# Patient Record
Sex: FEMALE | Race: WHITE | Employment: OTHER | URBAN - METROPOLITAN AREA
[De-identification: names, ages, dates, MRNs, and addresses within clinical notes are randomized per-mention and may not be internally consistent; named-entity substitution may affect disease eponyms.]

---

## 2017-03-04 ENCOUNTER — APPOINTMENT (OUTPATIENT)
Dept: GENERAL RADIOLOGY | Age: 52
DRG: 378 | End: 2017-03-04
Attending: EMERGENCY MEDICINE
Payer: MEDICARE

## 2017-03-04 ENCOUNTER — HOSPITAL ENCOUNTER (INPATIENT)
Age: 52
LOS: 3 days | Discharge: HOME | DRG: 378 | End: 2017-03-07
Attending: EMERGENCY MEDICINE | Admitting: FAMILY MEDICINE
Payer: MEDICARE

## 2017-03-04 DIAGNOSIS — F10.10 ALCOHOL ABUSE: ICD-10-CM

## 2017-03-04 DIAGNOSIS — K92.1 GASTROINTESTINAL HEMORRHAGE WITH MELENA: Primary | ICD-10-CM

## 2017-03-04 DIAGNOSIS — D50.9 MICROCYTIC ANEMIA: ICD-10-CM

## 2017-03-04 DIAGNOSIS — K27.9 PUD (PEPTIC ULCER DISEASE): ICD-10-CM

## 2017-03-04 PROBLEM — K92.2 GI BLEED: Status: ACTIVE | Noted: 2017-03-04

## 2017-03-04 PROBLEM — D62 ACUTE BLOOD LOSS ANEMIA: Status: ACTIVE | Noted: 2017-03-04

## 2017-03-04 LAB
ALBUMIN SERPL BCP-MCNC: 3 G/DL (ref 3.5–5)
ALBUMIN/GLOB SERPL: 1 {RATIO} (ref 1.1–2.2)
ALP SERPL-CCNC: 100 U/L (ref 45–117)
ALT SERPL-CCNC: 35 U/L (ref 12–78)
AMYLASE SERPL-CCNC: 37 U/L (ref 25–115)
ANION GAP BLD CALC-SCNC: 14 MMOL/L (ref 5–15)
APTT PPP: 23.6 SEC (ref 22.1–32.5)
AST SERPL W P-5'-P-CCNC: 34 U/L (ref 15–37)
BASOPHILS # BLD AUTO: 0.1 K/UL (ref 0–0.1)
BASOPHILS # BLD: 0 % (ref 0–1)
BILIRUB SERPL-MCNC: 0.3 MG/DL (ref 0.2–1)
BUN SERPL-MCNC: 36 MG/DL (ref 6–20)
BUN/CREAT SERPL: 46 (ref 12–20)
CALCIUM SERPL-MCNC: 8.3 MG/DL (ref 8.5–10.1)
CHLORIDE SERPL-SCNC: 101 MMOL/L (ref 97–108)
CK MB CFR SERPL CALC: NORMAL % (ref 0–2.5)
CK MB SERPL-MCNC: <1 NG/ML (ref 5–25)
CK SERPL-CCNC: 48 U/L (ref 26–192)
CO2 SERPL-SCNC: 22 MMOL/L (ref 21–32)
CREAT SERPL-MCNC: 0.79 MG/DL (ref 0.55–1.02)
EOSINOPHIL # BLD: 0.2 K/UL (ref 0–0.4)
EOSINOPHIL NFR BLD: 1 % (ref 0–7)
ERYTHROCYTE [DISTWIDTH] IN BLOOD BY AUTOMATED COUNT: 13.9 % (ref 11.5–14.5)
GLOBULIN SER CALC-MCNC: 3 G/DL (ref 2–4)
GLUCOSE SERPL-MCNC: 225 MG/DL (ref 65–100)
HCT VFR BLD AUTO: 23.5 % (ref 35–47)
HEMOCCULT STL QL: POSITIVE
HGB BLD-MCNC: 7.5 G/DL (ref 11.5–16)
INR PPP: 1.2 (ref 0.9–1.1)
LIPASE SERPL-CCNC: 148 U/L (ref 73–393)
LYMPHOCYTES # BLD AUTO: 13 % (ref 12–49)
LYMPHOCYTES # BLD: 1.6 K/UL (ref 0.8–3.5)
MCH RBC QN AUTO: 32.1 PG (ref 26–34)
MCHC RBC AUTO-ENTMCNC: 31.9 G/DL (ref 30–36.5)
MCV RBC AUTO: 100.4 FL (ref 80–99)
MONOCYTES # BLD: 0.8 K/UL (ref 0–1)
MONOCYTES NFR BLD AUTO: 6 % (ref 5–13)
NEUTS SEG # BLD: 9.5 K/UL (ref 1.8–8)
NEUTS SEG NFR BLD AUTO: 80 % (ref 32–75)
PLATELET # BLD AUTO: 250 K/UL (ref 150–400)
POTASSIUM SERPL-SCNC: 3.6 MMOL/L (ref 3.5–5.1)
PROT SERPL-MCNC: 6 G/DL (ref 6.4–8.2)
PROTHROMBIN TIME: 12.1 SEC (ref 9–11.1)
RBC # BLD AUTO: 2.34 M/UL (ref 3.8–5.2)
SODIUM SERPL-SCNC: 137 MMOL/L (ref 136–145)
THERAPEUTIC RANGE,PTTT: NORMAL SECS (ref 58–77)
TROPONIN I SERPL-MCNC: <0.04 NG/ML
WBC # BLD AUTO: 12 K/UL (ref 3.6–11)

## 2017-03-04 PROCEDURE — 86920 COMPATIBILITY TEST SPIN: CPT | Performed by: EMERGENCY MEDICINE

## 2017-03-04 PROCEDURE — 84484 ASSAY OF TROPONIN QUANT: CPT | Performed by: EMERGENCY MEDICINE

## 2017-03-04 PROCEDURE — 85025 COMPLETE CBC W/AUTO DIFF WBC: CPT | Performed by: EMERGENCY MEDICINE

## 2017-03-04 PROCEDURE — 85610 PROTHROMBIN TIME: CPT | Performed by: EMERGENCY MEDICINE

## 2017-03-04 PROCEDURE — 82550 ASSAY OF CK (CPK): CPT | Performed by: EMERGENCY MEDICINE

## 2017-03-04 PROCEDURE — 30233N1 TRANSFUSION OF NONAUTOLOGOUS RED BLOOD CELLS INTO PERIPHERAL VEIN, PERCUTANEOUS APPROACH: ICD-10-PCS | Performed by: EMERGENCY MEDICINE

## 2017-03-04 PROCEDURE — 65660000000 HC RM CCU STEPDOWN

## 2017-03-04 PROCEDURE — 93005 ELECTROCARDIOGRAM TRACING: CPT

## 2017-03-04 PROCEDURE — 96374 THER/PROPH/DIAG INJ IV PUSH: CPT

## 2017-03-04 PROCEDURE — 36415 COLL VENOUS BLD VENIPUNCTURE: CPT | Performed by: EMERGENCY MEDICINE

## 2017-03-04 PROCEDURE — C9113 INJ PANTOPRAZOLE SODIUM, VIA: HCPCS | Performed by: EMERGENCY MEDICINE

## 2017-03-04 PROCEDURE — 86900 BLOOD TYPING SEROLOGIC ABO: CPT | Performed by: EMERGENCY MEDICINE

## 2017-03-04 PROCEDURE — P9016 RBC LEUKOCYTES REDUCED: HCPCS | Performed by: EMERGENCY MEDICINE

## 2017-03-04 PROCEDURE — 96375 TX/PRO/DX INJ NEW DRUG ADDON: CPT

## 2017-03-04 PROCEDURE — 74011250636 HC RX REV CODE- 250/636: Performed by: EMERGENCY MEDICINE

## 2017-03-04 PROCEDURE — 36430 TRANSFUSION BLD/BLD COMPNT: CPT

## 2017-03-04 PROCEDURE — 83690 ASSAY OF LIPASE: CPT | Performed by: EMERGENCY MEDICINE

## 2017-03-04 PROCEDURE — 82272 OCCULT BLD FECES 1-3 TESTS: CPT | Performed by: EMERGENCY MEDICINE

## 2017-03-04 PROCEDURE — 96361 HYDRATE IV INFUSION ADD-ON: CPT

## 2017-03-04 PROCEDURE — 71010 XR CHEST PORT: CPT

## 2017-03-04 PROCEDURE — 77030029131 HC ADMN ST IV BLD N DEHP ICUM -B

## 2017-03-04 PROCEDURE — 99285 EMERGENCY DEPT VISIT HI MDM: CPT

## 2017-03-04 PROCEDURE — 85730 THROMBOPLASTIN TIME PARTIAL: CPT | Performed by: EMERGENCY MEDICINE

## 2017-03-04 PROCEDURE — 80053 COMPREHEN METABOLIC PANEL: CPT | Performed by: EMERGENCY MEDICINE

## 2017-03-04 PROCEDURE — 82150 ASSAY OF AMYLASE: CPT | Performed by: EMERGENCY MEDICINE

## 2017-03-04 PROCEDURE — 74011000250 HC RX REV CODE- 250: Performed by: EMERGENCY MEDICINE

## 2017-03-04 RX ORDER — SODIUM CHLORIDE 9 MG/ML
250 INJECTION, SOLUTION INTRAVENOUS AS NEEDED
Status: DISCONTINUED | OUTPATIENT
Start: 2017-03-04 | End: 2017-03-07 | Stop reason: HOSPADM

## 2017-03-04 RX ORDER — PANTOPRAZOLE SODIUM 40 MG/10ML
40 INJECTION, POWDER, LYOPHILIZED, FOR SOLUTION INTRAVENOUS
Status: COMPLETED | OUTPATIENT
Start: 2017-03-04 | End: 2017-03-04

## 2017-03-04 RX ADMIN — PANTOPRAZOLE SODIUM 40 MG: 40 INJECTION, POWDER, FOR SOLUTION INTRAVENOUS at 22:09

## 2017-03-04 RX ADMIN — FOLIC ACID: 5 INJECTION, SOLUTION INTRAMUSCULAR; INTRAVENOUS; SUBCUTANEOUS at 22:26

## 2017-03-04 RX ADMIN — SODIUM CHLORIDE 1000 ML: 900 INJECTION, SOLUTION INTRAVENOUS at 21:54

## 2017-03-04 NOTE — IP AVS SNAPSHOT
Bynum Montserrat 
 
 
 [address] [facility] 
[phone] Patient: Ivanna Alexis MRN: [id number] SGM:[birth year] You are allergic to the following Allergen Reactions Pcn (Penicillins) Hives Recent Documentation Height Weight Breastfeeding? BMI OB Status Smoking Status 1.473 m 54.4 kg No 25.08 kg/m2 Postmenopausal Never Smoker Unresulted Labs Order Current Status SAMPLE TO BLOOD BANK In process TYPE & CROSSMATCH Preliminary result Emergency Contacts Name Discharge Info Relation Home Work Mobile [facility] CAREGIVER [3] Friend [5] [phone] About your hospitalization You were admitted on:  March 4, 2017 You last received care in the:  [facility] 3 PRO CARE TELE 2 You were discharged on:  March 7, 2017 Unit phone number:  [phone] Why you were hospitalized Your primary diagnosis was:  Gi Bleed Your diagnoses also included:  Hypothyroidism, Depression, Alcohol Abuse, Acute Blood Loss Anemia Providers Seen During Your Hospitalizations Provider Role Specialty Primary office phone Clarice Lock MD Attending Provider Emergency Medicine [phone] Conrado Rivas MD Attending Provider [facility] [phone] Your Primary Care Physician (PCP) Primary Care Physician Office Phone Office Fax [place], [address] [facility] [phone] Follow-up Information Follow up With Details Comments Contact Info Lai Williamson MD   [address] [address] 
[phone] Current Discharge Medication List  
  
CONTINUE these medications which have CHANGED Dose & Instructions Dispensing Information Comments Morning Noon Evening Bedtime  
 omeprazole 20 mg capsule Commonly known as:  PRILOSEC What changed:   
- when to take this 
- reasons to take this Your next dose is: Today, Tomorrow Other:  _________ Dose:  20 mg Take 1 Cap by mouth two (2) times a day. Quantity:  60 Cap Refills:  3 CONTINUE these medications which have NOT CHANGED Dose & Instructions Dispensing Information Comments Morning Noon Evening Bedtime  
 albuterol 90 mcg/actuation inhaler Commonly known as:  PROVENTIL HFA, VENTOLIN HFA, PROAIR HFA Your next dose is: Today, Tomorrow Other:  _________ Dose:  2 Puff Take 2 Puffs by inhalation every six (6) hours as needed for Wheezing. Refills:  0 DULoxetine 60 mg capsule Commonly known as:  CYMBALTA Your next dose is: Today, Tomorrow Other:  _________ Dose:  120 mg Take 120 mg by mouth daily. Refills:  0  
     
   
   
   
  
 FISH -1,000 mg capsule Generic drug:  fish oil-omega-3 fatty acids Your next dose is: Today, Tomorrow Other:  _________ Dose:  1 Cap Take 1 Cap by mouth daily as needed (Psoriasis). Refills:  0 PRIMATENE ASTHMA 12.5-200 mg Tab Generic drug:  ePHEDrine-guaiFENesin Your next dose is: Today, Tomorrow Other:  _________ Dose:  1 Tab Take 1 Tab by mouth daily as needed. Refills:  0 STOP taking these medications   
 aspirin 325 mg tablet Commonly known as:  ASPIRIN Where to Get Your Medications Information on where to get these meds will be given to you by the nurse or doctor. ! Ask your nurse or doctor about these medications  
  omeprazole 20 mg capsule Discharge Instructions Patient Discharge Instructions Katelin Rojas / [id number] : 1965 Admitted 3/4/2017 Discharged: 3/7/2017 4:02 PM  
 
ACUTE DIAGNOSES: 
GI bleed 
melena CHRONIC MEDICAL DIAGNOSES: 
Problem List as of 3/7/2017  Date Reviewed: 3/5/2017 Codes Class Noted - Resolved * (Principal)GI bleed ICD-10-CM: K92.2 ICD-9-CM: 578.9  3/4/2017 - Present Acute blood loss anemia ICD-10-CM: D62 
ICD-9-CM: 285.1  3/4/2017 - Present Hypothyroidism (Chronic) ICD-10-CM: E03.9 ICD-9-CM: 244.9  6/3/2015 - Present Depression (Chronic) ICD-10-CM: F32.9 ICD-9-CM: 617  6/3/2015 - Present Microcytic anemia (Chronic) ICD-10-CM: D50.9 ICD-9-CM: 280.9  6/3/2015 - Present Alcohol abuse (Chronic) ICD-10-CM: F10.10 ICD-9-CM: 305.00  6/3/2015 - Present RESOLVED: SOB (shortness of breath) ICD-10-CM: R06.02 
ICD-9-CM: 786.05  6/3/2015 - 3/4/2017 DISCHARGE MEDICATIONS:  
No Asprin for now. No Motrin or Ibuprofen for now. No aleve for now. Use tylenol, · It is important that you take the medication exactly as they are prescribed. · Keep your medication in the bottles provided by the pharmacist and keep a list of the medication names, dosages, and times to be taken in your wallet. · Do not take other medications without consulting your doctor. DIET:  Regular Diet ACTIVITY: Activity as tolerated ADDITIONAL INFORMATION: If you experience any of the following symptoms then please call your primary care physician or return to the emergency room if you cannot get hold of your doctor: Fever, chills, nausea, vomiting, diarrhea, change in mentation, falling, bleeding, shortness of breath. FOLLOW UP CARE: 
Dr. Anthony Beatty MD  you are to call and set up an appointment to see them with in 1 week. Follow-up with specialists at directed by them Information obtained by : 
I understand that if any problems occur once I am at home I am to contact my physician. I understand and acknowledge receipt of the instructions indicated above. Physician's or R.N.'s Signature                                                                  Date/Time Patient or Representative Signature                                                          Date/Time Peptic Ulcer Disease: Care Instructions Your Care Instructions Peptic ulcers are sores on the inside of the stomach or the small intestine. They are usually caused by an infection with bacteria or from use of nonsteroidal anti-inflammatory drugs (NSAIDs). NSAIDs include aspirin, ibuprofen (Advil), and naproxen (Aleve). Your doctor may have prescribed medicine to reduce stomach acid. You also may need to take antibiotics if your peptic ulcers are caused by an infection. You can help your stomach heal and keep ulcers from coming back by making some changes in your lifestyle. Quit smoking, limit caffeine and alcohol, and reduce stress. Follow-up care is a key part of your treatment and safety. Be sure to make and go to all appointments, and call your doctor if you are having problems. Its also a good idea to know your test results and keep a list of the medicines you take. How can you care for yourself at home? · Take your medicines exactly as prescribed. Call your doctor if you think you are having a problem with your medicine. · Do not take aspirin or other NSAIDs such as ibuprofen (Advil or Motrin) or naproxen (Aleve). Ask your doctor what you can take for pain. · Do not smoke. Smoking can make ulcers worse. If you need help quitting, talk to your doctor about stop-smoking programs and medicines. These can increase your chances of quitting for good. · Drink in moderation or avoid drinking alcohol. · Do not drink beverages that have caffeine if they bother your stomach. These include coffee, tea, and soda. · Eat a balanced diet of small, frequent meals. Make an appointment with a dietitian if you need help planning your meals. · Reduce stress. Avoid people and places that make you feel anxious, if you can. Learn ways to reduce stress, such as biofeedback, guided imagery, and meditation. When should you call for help? Call 911 anytime you think you may need emergency care. For example, call if: 
· You passed out (lost consciousness). · You vomit blood or what looks like coffee grounds. · You pass maroon or very bloody stools. Call your doctor now or seek immediate medical care if: 
· You have severe pain in your belly, back, or shoulders. · You have new or worsening belly pain. · You are dizzy or lightheaded, or you feel like you may faint. · Your stools are black and tarlike or have streaks of blood. Watch closely for changes in your health, and be sure to contact your doctor if: 
· You have new symptoms such as weight loss, nausea or vomiting. · You do not feel better as expected. Where can you learn more? Go to http://sindy-abrahan.info/. Enter E626 in the search box to learn more about \"Peptic Ulcer Disease: Care Instructions. \" Current as of: August 9, 2016 Content Version: 11.1 © 2813-1898 [employer]. Care instructions adapted under license by [employer] (which disclaims liability or warranty for this information). If you have questions about a medical condition or this instruction, always ask your healthcare professional. [place] [address] any warranty or liability for your use of this information. Discharge Orders None [email or web address] Announcement We are excited to announce that we are making your provider's discharge notes available to you in [place].   You will see these notes when they are completed and signed by the physician that discharged you from your recent hospital stay. If you have any questions or concerns about any information you see in MyChart, please call the Health Information Department where you were seen or reach out to your Primary Care Provider for more information about your plan of care. General Information Please provide this summary of care documentation to your next provider. Patient Signature:  ____________________________________________________________ Date:  ____________________________________________________________  
  
Carolina Hero Provider Signature:  ____________________________________________________________ Date:  ____________________________________________________________

## 2017-03-04 NOTE — IP AVS SNAPSHOT
Current Discharge Medication List  
  
Take these medications at their scheduled times Dose & Instructions Dispensing Information Comments Morning Noon Evening Bedtime DULoxetine 60 mg capsule Commonly known as:  CYMBALTA Your next dose is: Today, Tomorrow Other:  ____________ Dose:  120 mg Take 120 mg by mouth daily. Refills:  0  
     
   
   
   
  
 omeprazole 20 mg capsule Commonly known as:  PRILOSEC Your next dose is: Today, Tomorrow Other:  ____________ Dose:  20 mg Take 1 Cap by mouth two (2) times a day. Quantity:  60 Cap Refills:  3 Take these medications as needed Dose & Instructions Dispensing Information Comments Morning Noon Evening Bedtime  
 albuterol 90 mcg/actuation inhaler Commonly known as:  PROVENTIL HFA, VENTOLIN HFA, PROAIR HFA Your next dose is: Today, Tomorrow Other:  ____________ Dose:  2 Puff Take 2 Puffs by inhalation every six (6) hours as needed for Wheezing. Refills:  0  
     
   
   
   
  
 FISH -1,000 mg capsule Generic drug:  fish oil-omega-3 fatty acids Your next dose is: Today, Tomorrow Other:  ____________ Dose:  1 Cap Take 1 Cap by mouth daily as needed (Psoriasis). Refills:  0 PRIMATENE ASTHMA 12.5-200 mg Tab Generic drug:  ePHEDrine-guaiFENesin Your next dose is: Today, Tomorrow Other:  ____________ Dose:  1 Tab Take 1 Tab by mouth daily as needed. Refills:  0 Where to Get Your Medications Information about where to get these medications is not yet available ! Ask your nurse or doctor about these medications  
  omeprazole 20 mg capsule

## 2017-03-05 LAB
AMPHET UR QL SCN: NEGATIVE
ANION GAP BLD CALC-SCNC: 8 MMOL/L (ref 5–15)
APPEARANCE UR: CLEAR
ATRIAL RATE: 111 BPM
BACTERIA URNS QL MICRO: NEGATIVE /HPF
BARBITURATES UR QL SCN: NEGATIVE
BENZODIAZ UR QL: NEGATIVE
BILIRUB UR QL: NEGATIVE
BUN SERPL-MCNC: 15 MG/DL (ref 6–20)
BUN/CREAT SERPL: 27 (ref 12–20)
CALCIUM SERPL-MCNC: 7.3 MG/DL (ref 8.5–10.1)
CALCULATED P AXIS, ECG09: 72 DEGREES
CALCULATED R AXIS, ECG10: 20 DEGREES
CALCULATED T AXIS, ECG11: 2 DEGREES
CANNABINOIDS UR QL SCN: NEGATIVE
CHLORIDE SERPL-SCNC: 110 MMOL/L (ref 97–108)
CO2 SERPL-SCNC: 24 MMOL/L (ref 21–32)
COCAINE UR QL SCN: NEGATIVE
COLOR UR: ABNORMAL
CREAT SERPL-MCNC: 0.56 MG/DL (ref 0.55–1.02)
DIAGNOSIS, 93000: NORMAL
DRUG SCRN COMMENT,DRGCM: NORMAL
EPITH CASTS URNS QL MICRO: ABNORMAL /LPF
ERYTHROCYTE [DISTWIDTH] IN BLOOD BY AUTOMATED COUNT: 16 % (ref 11.5–14.5)
EST. AVERAGE GLUCOSE BLD GHB EST-MCNC: NORMAL MG/DL
ETHANOL SERPL-MCNC: <10 MG/DL
GLUCOSE SERPL-MCNC: 93 MG/DL (ref 65–100)
GLUCOSE UR STRIP.AUTO-MCNC: NEGATIVE MG/DL
HBA1C MFR BLD: 4.6 % (ref 4.2–6.3)
HCT VFR BLD AUTO: 26.5 % (ref 35–47)
HGB BLD-MCNC: 8.9 G/DL (ref 11.5–16)
HGB UR QL STRIP: NEGATIVE
KETONES UR QL STRIP.AUTO: NEGATIVE MG/DL
LEUKOCYTE ESTERASE UR QL STRIP.AUTO: ABNORMAL
MAGNESIUM SERPL-MCNC: 1.6 MG/DL (ref 1.6–2.4)
MCH RBC QN AUTO: 31 PG (ref 26–34)
MCHC RBC AUTO-ENTMCNC: 33.6 G/DL (ref 30–36.5)
MCV RBC AUTO: 92.3 FL (ref 80–99)
METHADONE UR QL: NEGATIVE
NITRITE UR QL STRIP.AUTO: NEGATIVE
OPIATES UR QL: NEGATIVE
P-R INTERVAL, ECG05: 126 MS
PCP UR QL: NEGATIVE
PH UR STRIP: 6 [PH] (ref 5–8)
PHOSPHATE SERPL-MCNC: 2.2 MG/DL (ref 2.6–4.7)
PLATELET # BLD AUTO: 123 K/UL (ref 150–400)
POTASSIUM SERPL-SCNC: 3.1 MMOL/L (ref 3.5–5.1)
PROT UR STRIP-MCNC: NEGATIVE MG/DL
Q-T INTERVAL, ECG07: 328 MS
QRS DURATION, ECG06: 72 MS
QTC CALCULATION (BEZET), ECG08: 446 MS
RBC # BLD AUTO: 2.87 M/UL (ref 3.8–5.2)
RBC #/AREA URNS HPF: ABNORMAL /HPF (ref 0–5)
SODIUM SERPL-SCNC: 142 MMOL/L (ref 136–145)
SP GR UR REFRACTOMETRY: 1.01 (ref 1–1.03)
TSH SERPL DL<=0.05 MIU/L-ACNC: 3.18 UIU/ML (ref 0.36–3.74)
UA: UC IF INDICATED,UAUC: ABNORMAL
UROBILINOGEN UR QL STRIP.AUTO: 0.2 EU/DL (ref 0.2–1)
VENTRICULAR RATE, ECG03: 111 BPM
WBC # BLD AUTO: 4.5 K/UL (ref 3.6–11)
WBC URNS QL MICRO: ABNORMAL /HPF (ref 0–4)

## 2017-03-05 PROCEDURE — 74011000250 HC RX REV CODE- 250: Performed by: INTERNAL MEDICINE

## 2017-03-05 PROCEDURE — 84100 ASSAY OF PHOSPHORUS: CPT | Performed by: INTERNAL MEDICINE

## 2017-03-05 PROCEDURE — 77010033678 HC OXYGEN DAILY

## 2017-03-05 PROCEDURE — 74011250637 HC RX REV CODE- 250/637: Performed by: FAMILY MEDICINE

## 2017-03-05 PROCEDURE — 80048 BASIC METABOLIC PNL TOTAL CA: CPT | Performed by: INTERNAL MEDICINE

## 2017-03-05 PROCEDURE — 87086 URINE CULTURE/COLONY COUNT: CPT | Performed by: EMERGENCY MEDICINE

## 2017-03-05 PROCEDURE — 80307 DRUG TEST PRSMV CHEM ANLYZR: CPT | Performed by: EMERGENCY MEDICINE

## 2017-03-05 PROCEDURE — 74011250636 HC RX REV CODE- 250/636: Performed by: INTERNAL MEDICINE

## 2017-03-05 PROCEDURE — 36415 COLL VENOUS BLD VENIPUNCTURE: CPT | Performed by: INTERNAL MEDICINE

## 2017-03-05 PROCEDURE — 85027 COMPLETE CBC AUTOMATED: CPT | Performed by: INTERNAL MEDICINE

## 2017-03-05 PROCEDURE — 36430 TRANSFUSION BLD/BLD COMPNT: CPT

## 2017-03-05 PROCEDURE — 83036 HEMOGLOBIN GLYCOSYLATED A1C: CPT | Performed by: FAMILY MEDICINE

## 2017-03-05 PROCEDURE — 65660000000 HC RM CCU STEPDOWN

## 2017-03-05 PROCEDURE — C9113 INJ PANTOPRAZOLE SODIUM, VIA: HCPCS | Performed by: INTERNAL MEDICINE

## 2017-03-05 PROCEDURE — P9016 RBC LEUKOCYTES REDUCED: HCPCS | Performed by: EMERGENCY MEDICINE

## 2017-03-05 PROCEDURE — 84443 ASSAY THYROID STIM HORMONE: CPT | Performed by: INTERNAL MEDICINE

## 2017-03-05 PROCEDURE — 83735 ASSAY OF MAGNESIUM: CPT | Performed by: INTERNAL MEDICINE

## 2017-03-05 PROCEDURE — 81001 URINALYSIS AUTO W/SCOPE: CPT | Performed by: EMERGENCY MEDICINE

## 2017-03-05 RX ORDER — ALBUTEROL SULFATE 90 UG/1
2 AEROSOL, METERED RESPIRATORY (INHALATION)
COMMUNITY
End: 2018-05-08

## 2017-03-05 RX ORDER — DULOXETIN HYDROCHLORIDE 30 MG/1
120 CAPSULE, DELAYED RELEASE ORAL DAILY
Status: DISCONTINUED | OUTPATIENT
Start: 2017-03-05 | End: 2017-03-07 | Stop reason: HOSPADM

## 2017-03-05 RX ORDER — SODIUM CHLORIDE 0.9 % (FLUSH) 0.9 %
5-10 SYRINGE (ML) INJECTION AS NEEDED
Status: DISCONTINUED | OUTPATIENT
Start: 2017-03-05 | End: 2017-03-07 | Stop reason: HOSPADM

## 2017-03-05 RX ORDER — ASPIRIN 325 MG
325 TABLET ORAL
COMMUNITY
End: 2017-03-07

## 2017-03-05 RX ORDER — SODIUM CHLORIDE 0.9 % (FLUSH) 0.9 %
5-10 SYRINGE (ML) INJECTION EVERY 8 HOURS
Status: DISCONTINUED | OUTPATIENT
Start: 2017-03-05 | End: 2017-03-07 | Stop reason: HOSPADM

## 2017-03-05 RX ORDER — ACETAMINOPHEN 325 MG/1
650 TABLET ORAL
Status: DISCONTINUED | OUTPATIENT
Start: 2017-03-05 | End: 2017-03-07 | Stop reason: HOSPADM

## 2017-03-05 RX ORDER — OMEPRAZOLE 20 MG/1
20 CAPSULE, DELAYED RELEASE ORAL
Status: ON HOLD | COMMUNITY
End: 2017-03-07

## 2017-03-05 RX ORDER — SODIUM CHLORIDE 9 MG/ML
125 INJECTION, SOLUTION INTRAVENOUS CONTINUOUS
Status: DISCONTINUED | OUTPATIENT
Start: 2017-03-05 | End: 2017-03-07

## 2017-03-05 RX ADMIN — SODIUM CHLORIDE 40 MG: 9 INJECTION INTRAMUSCULAR; INTRAVENOUS; SUBCUTANEOUS at 22:35

## 2017-03-05 RX ADMIN — ACETAMINOPHEN 650 MG: 325 TABLET ORAL at 10:23

## 2017-03-05 RX ADMIN — Medication 10 ML: at 14:00

## 2017-03-05 RX ADMIN — SODIUM CHLORIDE 125 ML/HR: 900 INJECTION, SOLUTION INTRAVENOUS at 01:14

## 2017-03-05 RX ADMIN — SODIUM CHLORIDE 125 ML/HR: 900 INJECTION, SOLUTION INTRAVENOUS at 12:29

## 2017-03-05 RX ADMIN — SODIUM CHLORIDE 40 MG: 9 INJECTION INTRAMUSCULAR; INTRAVENOUS; SUBCUTANEOUS at 08:16

## 2017-03-05 RX ADMIN — DULOXETINE HYDROCHLORIDE 120 MG: 30 CAPSULE, DELAYED RELEASE ORAL at 11:03

## 2017-03-05 RX ADMIN — Medication 10 ML: at 22:38

## 2017-03-05 RX ADMIN — SODIUM CHLORIDE 125 ML/HR: 900 INJECTION, SOLUTION INTRAVENOUS at 21:22

## 2017-03-05 NOTE — ED PROVIDER NOTES
HPI Comments: 46 y.o. female with past medical history significant for Asthma, Seizures, Pancreatitis, Psoriasis who presents from home driven by freind with chief complaint of SOB. Pt reports 1 day hx of gradually worsening symptoms of SOB accompanied by chest tightness and dry cough. Pt also c/o RUQ pain, diarrhea with blood in stool. She notes hx of similar symptoms 2 years ago related to \"bleeding ulcers\". Pt reports at that time she was admitted and during stay received 4 units of blood. Pt notes the use of Prilosec PRN. Pt denies chest pain, nausea, vomiting, constipation, urinary symptoms, neck pain, back pain, fever, chills, congestion, headache, numbness or tingling. There are no other acute medical concerns at this time. Old chart review: Pt admitted 06/03/15-06/05/15 for gastrointestinal hemorrhage with microcytic/iron deficient anemia. EGD performed 2 ulcers. HGB 3.8. SOB secondary to anemia. Alcohol abuse education. Celiac disease noted. Pt discharged home on Protonix. Social hx: + EtOH use (\"2-3 beers every other night\"), Non-smoker. No illicit drug use. PCP: Tomasa Middleton MD  GI: Thang Coles MD     Note written by Simran Roman, as dictated by Comfort Shah MD 9:52 PM    The history is provided by the patient. No  was used.         Past Medical History:   Diagnosis Date    Anemia     Asthma     Depression     Hypothyroid     Pancreatitis     7 years ago    Psychiatric disorder     Seizures ([facility] [address]        Past Surgical History:   Procedure Laterality Date    COLONOSCOPY  12/4/2014         EGD  12/4/2014         HX CHOLECYSTECTOMY      UPPER GI ENDOSCOPY,BIOPSY  6/4/2015              Family History:   Problem Relation Age of Onset    Cancer Neg Hx     Diabetes Neg Hx        Social History     Social History    Marital status:      Spouse name: N/A    Number of children: N/A    Years of education: N/A     Occupational History    Not on file. Social History Main Topics    Smoking status: Never Smoker    Smokeless tobacco: Never Used    Alcohol use 1.8 oz/week     3 Cans of beer per week      Comment: 2-3 beers very other day.  Drug use: No    Sexual activity: Not on file     Other Topics Concern    Not on file     Social History Narrative         ALLERGIES: Pcn [penicillins]    Review of Systems   Constitutional: Negative for chills and fever. HENT: Negative for congestion. Respiratory: Positive for cough (dry), chest tightness and shortness of breath. Cardiovascular: Negative for chest pain. Gastrointestinal: Positive for abdominal pain (RUQ), blood in stool and diarrhea. Negative for constipation, nausea and vomiting. Genitourinary: Negative for difficulty urinating. Musculoskeletal: Negative for back pain and neck pain. Neurological: Negative for numbness and headaches. All other systems reviewed and are negative. Vitals:    03/04/17 2119   Weight: 54.4 kg (120 lb)   Height: 4' 10\" (1.473 m)            Physical Exam   Nursing note and vitals reviewed. CONSTITUTIONAL: Well-appearing; well-nourished; in mild distress  HEAD: Normocephalic; atraumatic  EYES: PERRL; EOM intact; conjunctiva and sclera are clear bilaterally. ENT: No rhinorrhea; normal pharynx with no tonsillar hypertrophy; mucous membranes pink/moist, no erythema, no exudate. NECK: Supple; non-tender; no cervical lymphadenopathy  CARD: Normal S1, S2; no murmurs, rubs, or gallops. Regular rate and rhythm. RESP: Normal respiratory effort; breath sounds clear and equal bilaterally; no wheezes, rhonchi, or rales. ABD: Normal bowel sounds; non-distended; epigastric tenderness; no palpable organomegaly, no masses, no bruits. Back Exam: Normal inspection; no vertebral point tenderness, no CVA tenderness. Normal range of motion.   EXT: Normal ROM in all four extremities; non-tender to palpation; no swelling or deformity; distal pulses are normal, no edema. SKIN: Warm; dry; Pale; Scattered psoriatic rash. NEURO:Alert and oriented x 3, coherent, CARA-XII grossly intact, sensory and motor are non-focal.  Rectal Exam: Normal inspection; Good tone; Black color stools; guaiac positive        MDM  Number of Diagnoses or Management Options  Alcohol abuse:   Gastrointestinal hemorrhage with melena:   Microcytic anemia:   PUD (peptic ulcer disease):   Diagnosis management comments: Assessment: 58-year-old female with history of GI bleed, microcytic anemia, peptic ulcers and alcohol abuse who presents with shortness of breath and bloody stools and epigastric discomfort. The patient also admits to continues drinking behavior. Differential diagnoses consist of GI bleed likely secondary to alcoholic gastritis and  Peptic ulcer  Disease/ alcohol abuse with dependency/ electrolyte abnormality and ACS      Plan: EKG/ chest x-ray/ lab/ IV fluid/ Protonix/ blood transfusion/ consult hospitalist and GI/ Monitor and Reevaluate.          Amount and/or Complexity of Data Reviewed  Clinical lab tests: ordered and reviewed  Tests in the radiology section of CPT®: ordered and reviewed  Tests in the medicine section of CPT®: reviewed and ordered  Discussion of test results with the performing providers: yes  Decide to obtain previous medical records or to obtain history from someone other than the patient: yes  Obtain history from someone other than the patient: yes  Review and summarize past medical records: yes  Discuss the patient with other providers: yes  Independent visualization of images, tracings, or specimens: yes    Risk of Complications, Morbidity, and/or Mortality  Presenting problems: moderate  Diagnostic procedures: moderate  Management options: moderate    Critical Care  Total time providing critical care: (Total critical care time spend exclusive of procedures: 45 minutes)    Patient Progress  Patient progress: stable    ED Course       Procedures     ED EKG interpretation:  Rhythm: sinus tachycardia; and regular . Rate (approx.): 111; Axis: normal; P wave: normal; QRS interval: normal ; ST/T wave: non-specific changes; in  Lead: Diffusely; Other findings: abnormal ekg. This EKG was interpreted by Sandra Gross MD,ED Provider. XRAY INTERPRETATION (ED MD)  Chest Xray  No acute process seen. Normal heart size. No bony abnormalities. No infiltrate. Sandra Gross MD 10:12 PM      PROGRESS NOTE:  Pt has been reexamined by Sandra Gross MD all available results have been reviewed with pt and any available family. Pt understands sx, dx, and tx in ED. Care plan has been outlined and questions have been answered. Pt and any available family understands and agrees to need for admission to hospital for further tx not available in ED. Pt is ready for admission. Written by Sandra Gross MD,  10:14 PM    CONSULT NOTE:  Sandra Gross MD spoke with Dr. Suellen Torres of the adult hospitalist team. Discussed patient's presentation, history, physical assessment, and available diagnostic results.  He will evaluate, write orders and admit the patient to the hospital. 10:45 PM        .

## 2017-03-05 NOTE — H&P
[address]  [phone]    Admission History and Physical      NAME:  Hodan Coughlin   :   1965   MRN:  [id number]     PCP:  Aisha Rasmussen MD     Date/Time:  3/4/2017         Subjective:     CHIEF COMPLAINT: GI bleed     HISTORY OF PRESENT ILLNESS:     Ms. MCCRARY [facility] IN [facility] is a 46 y.o.  female who is admitted with GI bleed. Ms. RONDON SAMANTHA [facility] IN THE [facility] presented to the Emergency Department this PM complaining of GI bleed: started 3 days ago, intermittent, dark blood per rectum, associated with SOB    History obtained from chart review and the patient. Previous records reviewed    Past Medical History:   Diagnosis Date    Anemia     Asthma     Depression     Hypothyroid     Pancreatitis     7 years ago    Psychiatric disorder     Seizures ([facility] [address]         Past Surgical History:   Procedure Laterality Date    COLONOSCOPY  2014         EGD  2014         HX CHOLECYSTECTOMY      UPPER GI ENDOSCOPY,BIOPSY  2015            Social History   Substance Use Topics    Smoking status: Never Smoker    Smokeless tobacco: Never Used    Alcohol use 1.8 oz/week     3 Cans of beer per week      Comment: 2-3 beers very other day. Family History   Problem Relation Age of Onset    Cancer Neg Hx     Diabetes Neg Hx         Allergies   Allergen Reactions    Pcn [Penicillins] Hives        Prior to Admission medications    Medication Sig Start Date End Date Taking? Authorizing Provider   DULoxetine (CYMBALTA) 60 mg capsule Take 120 mg by mouth daily. Yes Historical Provider   ePHEDrine-guaiFENesin (PRIMATENE ASTHMA) 12.5-200 mg tab Take 1 Tab by mouth daily as needed.    Yes Historical Provider         Review of Systems:  (bold if positive, if negative)    Gen:  Eyes:  ENT:  CVS:  chest painPulm:  dyspneaGI:  Abdominal pain, nausea,melena  :    MS:  Skin:  Psych:  Endo:    Hem:  Renal:    Neuro:            Objective: VITALS:    Vital signs reviewed; most recent are:    Visit Vitals    /67 (BP 1 Location: Right arm, BP Patient Position: At rest)    Pulse 96    Temp 97.9 °F (36.6 °C)    Resp 19    Ht 4' 10\" (1.473 m)    Wt 54.4 kg (120 lb)    SpO2 100%    BMI 25.08 kg/m2     SpO2 Readings from Last 6 Encounters:   03/04/17 100%   06/05/15 96%   12/04/14 98%   07/18/14 94%   07/09/14 98%   08/20/13 100%    O2 Flow Rate (L/min): 2 l/min   No intake or output data in the 24 hours ending 03/04/17 2306         Exam:     Physical Exam:    Gen: Well-developed, well-nourished, in no acute distress  HEENT:  Pale conjunctivae, PERRL, hearing intact to voice, moist mucous membranes, very poor dentition, multiple broken and carious teeth  Neck: Supple, without masses, thyroid non-tender  Resp: No accessory muscle use, clear breath sounds without wheezes rales or rhonchi  Card: No murmurs, normal S1, S2 without thrills, bruits or peripheral edema  Abd:  Soft, non-tender, non-distended, normoactive bowel sounds are present, no palpable organomegaly and no detectable hernias  Lymph:  No cervical or inguinal adenopathy  Musc: No cyanosis or clubbing  Skin: No rashes or ulcers, skin turgor is good, pallor noted  Neuro:  Cranial nerves are grossly intact, no focal motor weakness, follows commands appropriately  Psych:  Good insight, oriented to person, place and time, alert             Labs:    Recent Labs      03/04/17 2142   WBC  12.0*   HGB  7.5*   HCT  23.5*   PLT  250     Recent Labs      03/04/17 2142   NA  137   K  3.6   CL  101   CO2  22   GLU  225*   BUN  36*   CREA  0.79   CA  8.3*   ALB  3.0*   TBILI  0.3   SGOT  34   ALT  35     Lab Results   Component Value Date/Time    Glucose (POC) 194 06/03/2015 11:26 AM    Glucose (POC) 99 07/09/2014 04:12 PM     No results for input(s): PH, PCO2, PO2, HCO3, FIO2 in the last 72 hours.   Recent Labs      03/04/17 2142   INR  1.2*       Additional testing:  Chest X-ray: no acute process.   Results not reviewed with Radiologist.       Assessment/Plan:       Principal Problem:    GI bleed (3/4/2017)   - upper vs lower bleed, unclear at this time   - close monitoring in StepDown unit   - IV PPI to continue   - GI consult   - NPO, IV hydration    Active Problems:    Hypothyroidism (6/3/2015)   - not on LT4   - check TSH in AM      Depression (6/3/2015)   - continue Cymbalta      Alcohol abuse (6/3/2015)   - counseled on cessation   - reports she does not drink every day, should be low risk for DTs      Acute blood loss anemia (3/4/2017)   - POA, due to acute GI blood loss over last few days   - receiving 2 units already ordered in ED   - follow Hgb closely, transfuse as need to maintain Hgb >8       Code status:   - patient is FULL CODE      Total time spent with patient: Jaja Ley discussed with: Patient and Dr. Dahl Failing    Discussed:  Code Status, Care Plan and D/C Planning    Prophylaxis:  SCD's and H2B/PPI    Probable Disposition:  Home w/Family           ___________________________________________________    Attending Physician: Cami Cole MD

## 2017-03-05 NOTE — PROGRESS NOTES
Bedside and Verbal shift change report given to Kahlil Gonzalez (oncoming nurse) by Hanh Cartagena (offgoing nurse). Report included the following information SBAR, Kardex and MAR.

## 2017-03-05 NOTE — PROGRESS NOTES
[facility]: MED RECONCILIATION    Comments/Recommendations: - Verified allergies as documented. She had a rash to PCN when she was ~ 9years old. She states she also had an upset stomach when they used IVP dye.   - Discussed that aspirin and other NSAIDs are not safe for her to use with her ulcers/ bleeding. She acknowledges that she understands but she states that tylenol does not help to relieve her pain. She has used ZULAY tablets in the past (2016), that relieved her migraines/ stress headaches. - In her refill history, I noted, levothyroxine 25mcg was filled from 8/2013 to 1/2015. She states that the last time she saw her PCP in 2015, they told her her thyroid levels was doing fine so she stopped taking the levothyroxine. Explained that she may need to follow-up on that. - She states that she has the albuterol inhaler for asthma exacerbations and she used advair in the past but she could not say why she stopped taking it other that she did not think she needed it. - She reports that she uses some OTC and prescription creams and ointments for her psoriasis but could not recall the names. Medications added:     · Aspirin  · Prilosec  · Fish oil  · Albuterol inhaler    Medications removed:    · None    Medications adjusted:    · None    Information obtained from: Patient, refill history    Significant PMH/Disease States:   Patient Active Problem List   Diagnosis Code    Hypothyroidism E03.9    Depression F32.9    Microcytic anemia D50.9    Alcohol abuse F10.10    GI bleed K92.2    Acute blood loss anemia D62       Chief Complaint for this Admission:   Chief Complaint   Patient presents with    Shortness of Breath       Allergies: Pcn [penicillins]      Prior to Admission Medications:   Prior to Admission Medications   Prescriptions Last Dose Informant Patient Reported? Taking? DULoxetine (CYMBALTA) 60 mg capsule 3/3/2017 at AM Self Yes Yes   Sig: Take 120 mg by mouth daily.    albuterol (PROVENTIL HFA, VENTOLIN HFA, PROAIR HFA) 90 mcg/actuation inhaler 2/5/2017 at Unknown time Self Yes Yes   Sig: Take 2 Puffs by inhalation every six (6) hours as needed for Wheezing. aspirin (ASPIRIN) 325 mg tablet 2/26/2017 at Unknown time Self Yes Yes   Sig: Take 325 mg by mouth every six (6) hours as needed (Migraines/ Stress headaches). ePHEDrine-guaiFENesin (PRIMATENE ASTHMA) 12.5-200 mg tab 2/26/2017 at Unknown time Self Yes Yes   Sig: Take 1 Tab by mouth daily as needed. fish oil-omega-3 fatty acids (FISH OIL) 340-1,000 mg capsule 2/26/2017 at Unknown time Self Yes Yes   Sig: Take 1 Cap by mouth daily as needed (Psoriasis). omeprazole (PRILOSEC) 20 mg capsule 2/26/2017 at Unknown time Self Yes Yes   Sig: Take 20 mg by mouth daily as needed.       Facility-Administered Medications: None       Thank you,  Tanvir Nava, PharmD     Contact: [phone]

## 2017-03-05 NOTE — ROUTINE PROCESS
TRANSFER - OUT REPORT:    Verbal report given to Liz Carballo RN (name) on Belkys Mendosa  being transferred to 334 (unit) for routine progression of care       Report consisted of patients Situation, Background, Assessment and   Recommendations(SBAR). Information from the following report(s) SBAR, ED Summary, Intake/Output, MAR, Recent Results and Cardiac Rhythm SR was reviewed with the receiving nurse. Lines:   Peripheral IV 03/04/17 Right Antecubital (Active)   Site Assessment Clean, dry, & intact 3/4/2017  9:46 PM   Phlebitis Assessment 0 3/4/2017  9:46 PM   Infiltration Assessment 0 3/4/2017  9:46 PM   Dressing Status Clean, dry, & intact 3/4/2017  9:46 PM   Dressing Type 4 X 4 3/4/2017  9:46 PM   Hub Color/Line Status Pink 3/4/2017  9:46 PM   Action Taken Catheter retaped 3/4/2017  9:46 PM       Peripheral IV 03/04/17 Left Hand (Active)   Site Assessment Clean, dry, & intact 3/4/2017 11:40 PM   Phlebitis Assessment 0 3/4/2017 11:40 PM   Infiltration Assessment 0 3/4/2017 11:40 PM        Opportunity for questions and clarification was provided.       Patient transported with:   Registered Nurse

## 2017-03-05 NOTE — ED TRIAGE NOTES
Patient arrives with c/o chest tightness and sob onset this afternoon and rectal bleeding onset Thursday. Patient states she has a hx of 2 bleeding ulcers, denies being on blood thinners. Patient pale at bedside.

## 2017-03-05 NOTE — PROGRESS NOTES
Daily Progress Note: 3/5/2017  Heber Mason MD    Assessment/Plan:   GI bleed (3/4/2017)  - upper vs lower bleed, unclear at this time  - close monitoring in StepDown unit  - IV PPI to continue  - GI consulted  - NPO, IV hydration     Hypothyroidism (6/3/2015)  - not on LT4  - check TSH in AM     Depression (6/3/2015)  - continue Cymbalta     Alcohol abuse (6/3/2015)  - counseled on cessation  - reports she does not drink every day, should be low risk for DTs     Acute blood loss anemia (3/4/2017)  - POA, due to acute GI blood loss over last few days  - received 2 units   - follow Hgb closely, transfuse as need to maintain Hgb >8    Elevated glucose  - add A1c    Code status:  - patient is FULL CODE        Problem List:  Problem List as of 3/5/2017  Date Reviewed: 3/5/2017          Codes Class Noted - Resolved    * (Principal)GI bleed ICD-10-CM: K92.2  ICD-9-CM: 578.9  3/4/2017 - Present        Acute blood loss anemia ICD-10-CM: D62  ICD-9-CM: 285.1  3/4/2017 - Present        Hypothyroidism (Chronic) ICD-10-CM: E03.9  ICD-9-CM: 244.9  6/3/2015 - Present        Depression (Chronic) ICD-10-CM: F32.9  ICD-9-CM: 311  6/3/2015 - Present        Microcytic anemia (Chronic) ICD-10-CM: D50.9  ICD-9-CM: 280.9  6/3/2015 - Present        Alcohol abuse (Chronic) ICD-10-CM: F10.10  ICD-9-CM: 305.00  6/3/2015 - Present        RESOLVED: SOB (shortness of breath) ICD-10-CM: R06.02  ICD-9-CM: 786.05  6/3/2015 - 3/4/2017              Subjective:    46 y.o.  female who is admitted with GI bleed. Ms. Christine Nicolas presented to the Emergency Department this PM complaining of GI bleed: started 3 days ago, intermittent, dark blood per rectum, associated with SOB  History obtained from chart review and the patient. Previous records reviewed (Dr Judy Liao). 3/5:  She is feeling a little better. Last night prior to coming in to the ER she was having palpitations and SOB.  She reports a few black stools that started on Thursday. No vomiting. Admits to drinking 2-3 beers every other day. Review of Systems:   A comprehensive review of systems was negative except for that written in the HPI. Objective:   Physical Exam:     Visit Vitals    /77    Pulse 89    Temp 98.2 °F (36.8 °C)    Resp 14    Ht 4' 10\" (1.473 m)    Wt 54.4 kg (120 lb)    SpO2 98%    BMI 25.08 kg/m2    O2 Flow Rate (L/min): 2 l/min O2 Device: Nasal cannula    Temp (24hrs), Av.2 °F (36.8 °C), Min:97.9 °F (36.6 °C), Max:98.5 °F (36.9 °C)         190 -  0700  In: 363.3   Out: 1125 [Urine:1125]    General:  Alert, cooperative, no distress, appears stated age. Head:  Normocephalic, without obvious abnormality, atraumatic. Eyes:  Conjunctivae/corneas clear. Nose: Nares normal. Septum midline. Mucosa normal. No drainage or sinus tenderness. Throat: Lips, mucosa, and tongue moist.  very poor dentition, multiple broken and carious teeth   Neck: Supple, symmetrical, trachea midline, no adenopathy, thyroid: no enlargement/tenderness/nodules, no carotid bruit and no JVD. Lungs:   Clear to auscultation bilaterally. Heart:  Regular rate and rhythm, S1, S2 normal, no murmur, click, rub or gallop. Abdomen:   Soft, non-tender. Bowel sounds normal. No masses,  No organomegaly. Extremities: no cyanosis or edema. No calf tenderness or cords. Pulses: 2+ and symmetric all extremities. Skin: Skin color, texture, turgor normal. No rashes or lesions   Neurologic: CNII-XII intact. Alert and oriented X 3. Fine motor of hands and fingers normal.   equal.  No cogwheeling or rigidity. Gait not tested at this time. Sensation grossly normal to touch.   Gross motor of extremities normal.       Data Review:       Recent Days:  Recent Labs      17   WBC  12.0*   HGB  7.5*   HCT  23.5*   PLT  250     Recent Labs      17   NA  137   K  3.6   CL  101   CO2  22   GLU  225*   BUN  36*   CREA  0.79   CA 8.3*   ALB  3.0*   TBILI  0.3   SGOT  34   ALT  35   INR  1.2*       24 Hour Results:  Recent Results (from the past 24 hour(s))   EKG, 12 LEAD, INITIAL    Collection Time: 03/04/17  9:21 PM   Result Value Ref Range    Ventricular Rate 111 BPM    Atrial Rate 111 BPM    P-R Interval 126 ms    QRS Duration 72 ms    Q-T Interval 328 ms    QTC Calculation (Bezet) 446 ms    Calculated P Axis 72 degrees    Calculated R Axis 20 degrees    Calculated T Axis 2 degrees    Diagnosis       Sinus tachycardia  Nonspecific ST abnormality  Abnormal ECG  When compared with ECG of 03-JUN-2015 11:10,  No significant change was found     CBC WITH AUTOMATED DIFF    Collection Time: 03/04/17  9:42 PM   Result Value Ref Range    WBC 12.0 (H) 3.6 - 11.0 K/uL    RBC 2.34 (L) 3.80 - 5.20 M/uL    HGB 7.5 (L) 11.5 - 16.0 g/dL    HCT 23.5 (L) 35.0 - 47.0 %    .4 (H) 80.0 - 99.0 FL    MCH 32.1 26.0 - 34.0 PG    MCHC 31.9 30.0 - 36.5 g/dL    RDW 13.9 11.5 - 14.5 %    PLATELET 263 725 - 245 K/uL    NEUTROPHILS 80 (H) 32 - 75 %    LYMPHOCYTES 13 12 - 49 %    MONOCYTES 6 5 - 13 %    EOSINOPHILS 1 0 - 7 %    BASOPHILS 0 0 - 1 %    ABS. NEUTROPHILS 9.5 (H) 1.8 - 8.0 K/UL    ABS. LYMPHOCYTES 1.6 0.8 - 3.5 K/UL    ABS. MONOCYTES 0.8 0.0 - 1.0 K/UL    ABS. EOSINOPHILS 0.2 0.0 - 0.4 K/UL    ABS.  BASOPHILS 0.1 0.0 - 0.1 K/UL   CK W/ CKMB & INDEX    Collection Time: 03/04/17  9:42 PM   Result Value Ref Range    CK 48 26 - 192 U/L    CK - MB <1.0 <3.6 NG/ML    CK-MB Index Cannot be calulated 0 - 2.5     METABOLIC PANEL, COMPREHENSIVE    Collection Time: 03/04/17  9:42 PM   Result Value Ref Range    Sodium 137 136 - 145 mmol/L    Potassium 3.6 3.5 - 5.1 mmol/L    Chloride 101 97 - 108 mmol/L    CO2 22 21 - 32 mmol/L    Anion gap 14 5 - 15 mmol/L    Glucose 225 (H) 65 - 100 mg/dL    BUN 36 (H) 6 - 20 MG/DL    Creatinine 0.79 0.55 - 1.02 MG/DL    BUN/Creatinine ratio 46 (H) 12 - 20      GFR est AA >60 >60 ml/min/1.73m2    GFR est non-AA >60 >60 ml/min/1.73m2    Calcium 8.3 (L) 8.5 - 10.1 MG/DL    Bilirubin, total 0.3 0.2 - 1.0 MG/DL    ALT (SGPT) 35 12 - 78 U/L    AST (SGOT) 34 15 - 37 U/L    Alk.  phosphatase 100 45 - 117 U/L    Protein, total 6.0 (L) 6.4 - 8.2 g/dL    Albumin 3.0 (L) 3.5 - 5.0 g/dL    Globulin 3.0 2.0 - 4.0 g/dL    A-G Ratio 1.0 (L) 1.1 - 2.2     PROTHROMBIN TIME + INR    Collection Time: 03/04/17  9:42 PM   Result Value Ref Range    INR 1.2 (H) 0.9 - 1.1      Prothrombin time 12.1 (H) 9.0 - 11.1 sec   PTT    Collection Time: 03/04/17  9:42 PM   Result Value Ref Range    aPTT 23.6 22.1 - 32.5 sec    aPTT, therapeutic range     58.0 - 77.0 SECS   LIPASE    Collection Time: 03/04/17  9:42 PM   Result Value Ref Range    Lipase 148 73 - 393 U/L   AMYLASE    Collection Time: 03/04/17  9:42 PM   Result Value Ref Range    Amylase 37 25 - 115 U/L   TROPONIN I    Collection Time: 03/04/17  9:42 PM   Result Value Ref Range    Troponin-I, Qt. <0.04 <0.05 ng/mL   TYPE & CROSSMATCH    Collection Time: 03/04/17  9:42 PM   Result Value Ref Range    Crossmatch Expiration 03/07/2017     ABO/Rh(D) Darrol Lalo POSITIVE     Antibody screen NEG     Unit number [id number]     Blood component type  LR AS1     Unit division 00     Status of unit ISSUED     Crossmatch result Compatible     Unit number [id number]     Blood component type  LR AS1     Unit division 00     Status of unit ISSUED     Crossmatch result Compatible    OCCULT BLOOD, STOOL    Collection Time: 03/04/17  9:56 PM   Result Value Ref Range    Occult blood, stool POSITIVE (A) NEG     URINALYSIS W/ REFLEX CULTURE    Collection Time: 03/05/17  3:02 AM   Result Value Ref Range    Color YELLOW/STRAW      Appearance CLEAR CLEAR      Specific gravity 1.014 1.003 - 1.030      pH (UA) 6.0 5.0 - 8.0      Protein NEGATIVE  NEG mg/dL    Glucose NEGATIVE  NEG mg/dL    Ketone NEGATIVE  NEG mg/dL    Bilirubin NEGATIVE  NEG      Blood NEGATIVE  NEG      Urobilinogen 0.2 0.2 - 1.0 EU/dL    Nitrites NEGATIVE  NEG Leukocyte Esterase TRACE (A) NEG      WBC 5-10 0 - 4 /hpf    RBC 0-5 0 - 5 /hpf    Epithelial cells FEW FEW /lpf    Bacteria NEGATIVE  NEG /hpf    UA:UC IF INDICATED URINE CULTURE ORDERED (A) CNI     DRUG SCREEN, URINE    Collection Time: 03/05/17  3:03 AM   Result Value Ref Range    AMPHETAMINE NEGATIVE  NEG      BARBITURATES NEGATIVE  NEG      BENZODIAZEPINE NEGATIVE  NEG      COCAINE NEGATIVE  NEG      METHADONE NEGATIVE  NEG      OPIATES NEGATIVE  NEG      PCP(PHENCYCLIDINE) NEGATIVE  NEG      THC (TH-CANNABINOL) NEGATIVE  NEG      Drug screen comment (NOTE)        Medications reviewed  Current Facility-Administered Medications   Medication Dose Route Frequency    pantoprazole (PROTONIX) 40 mg in sodium chloride 0.9 % 10 mL injection  40 mg IntraVENous Q12H    0.9% sodium chloride infusion  125 mL/hr IntraVENous CONTINUOUS    sodium chloride (NS) flush 5-10 mL  5-10 mL IntraVENous Q8H    sodium chloride (NS) flush 5-10 mL  5-10 mL IntraVENous PRN    0.9% sodium chloride infusion 250 mL  250 mL IntraVENous PRN       Care Plan discussed with: Patient/Family    Total time spent with patient: 30 minutes.     Biju Porter MD

## 2017-03-05 NOTE — PROGRESS NOTES
Primary Nurse Jennifer Quiñones and Sherryle Galloway, RN performed a dual skin assessment on this patient Impairment noted- see wound doc flow sheet  Joe score is 21  Red dry patches scattered throughout body.

## 2017-03-05 NOTE — CONSULTS
Gastrointestinal Consult    Subjective:     Patient is a 46 y.o.  female who is being seen with tarry stools. Onset of symptoms was abrupt with rapidly improving course since that time. Patient also notes absence red blood, vomiting, pain. Symptoms improve with none. Symptoms worsen with none. There is history of aspirin use. There is no history of inflammatory bowel disease. Past history includes ulcers. Previous studies include upper endoscopy and colonoscopy. Last bowel movement about 24 hours ago    Patient Active Problem List    Diagnosis Date Noted    GI bleed 03/04/2017    Acute blood loss anemia 03/04/2017    Hypothyroidism 06/03/2015    Depression 06/03/2015    Microcytic anemia 06/03/2015    Alcohol abuse 06/03/2015     Past Medical History:   Diagnosis Date    Anemia     Asthma     Depression     Hypothyroid     Pancreatitis     7 years ago    Psychiatric disorder     Seizures ([facility] [address]       Past Surgical History:   Procedure Laterality Date    COLONOSCOPY  12/4/2014         EGD  12/4/2014         HX CHOLECYSTECTOMY      UPPER GI ENDOSCOPY,BIOPSY  6/4/2015          Family History   Problem Relation Age of Onset    Cancer Neg Hx     Diabetes Neg Hx       Social History     Social History    Marital status:      Spouse name: N/A    Number of children: N/A    Years of education: N/A     Social History Main Topics    Smoking status: Never Smoker    Smokeless tobacco: Never Used    Alcohol use 1.8 oz/week     3 Cans of beer per week      Comment: 2-3 beers very other day.     Drug use: No    Sexual activity: Not Asked     Other Topics Concern    None     Social History Narrative       Current Facility-Administered Medications   Medication Dose Route Frequency Provider Last Rate Last Dose    pantoprazole (PROTONIX) 40 mg in sodium chloride 0.9 % 10 mL injection  40 mg IntraVENous Q12H Marylee Alberts, MD   40 mg at 03/05/17 0816    0.9% sodium chloride infusion 125 mL/hr IntraVENous CONTINUOUS Estanislado Sandhoff,  mL/hr at 03/05/17 0114 125 mL/hr at 03/05/17 0114    sodium chloride (NS) flush 5-10 mL  5-10 mL IntraVENous Q8H Estanislado Sandhoff, MD        sodium chloride (NS) flush 5-10 mL  5-10 mL IntraVENous PRN Estanislado Sandhoff, MD        acetaminophen (TYLENOL) tablet 650 mg  650 mg Oral Q4H PRN Da Brown MD        DULoxetine (CYMBALTA) capsule 120 mg  120 mg Oral DAILY Da Brown MD        0.9% sodium chloride infusion 250 mL  250 mL IntraVENous PRN Scott Ricks MD             Allergies   Allergen Reactions    Pcn [Penicillins] Hives        Review of Systems:  Constitutional: negative for fevers, chills, sweats, anorexia and weight loss  Eyes: negative for visual disturbance and icterus  Respiratory: negative for cough, sputum, pleurisy/chest pain, asthma or stridor  Cardiovascular: negative for chest pain, chest pressure/discomfort, dyspnea, irregular heart beats, near-syncope, syncope, lower extremity edema  Gastrointestinal: negative for dysphagia, odynophagia, vomiting, abdominal pain and jaundice  Musculoskeletal:negative for myalgias and arthralgias  Neurological: positive for headaches     Objective:     Patient Vitals for the past 8 hrs:   BP Temp Pulse Resp SpO2   03/05/17 0945 143/80 - 84 17 100 %   03/05/17 0930 122/75 - 82 14 100 %   03/05/17 0915 126/71 - 78 14 100 %   03/05/17 0900 (!) 117/101 - 75 15 100 %   03/05/17 0845 134/65 - 83 15 100 %   03/05/17 0816 131/77 98.2 °F (36.8 °C) 89 14 98 %   03/05/17 0745 109/69 98.2 °F (36.8 °C) 81 11 100 %   03/05/17 0730 106/67 98 °F (36.7 °C) 81 14 100 %   03/05/17 0715 111/64 - 86 18 100 %   03/05/17 0700 114/61 98.4 °F (36.9 °C) 80 14 100 %   03/05/17 0652 - - 84 - -   03/05/17 0645 103/59 - 78 15 100 %   03/05/17 0630 109/65 97.9 °F (36.6 °C) 79 13 100 %   03/05/17 0615 110/59 98.5 °F (36.9 °C) 84 18 100 %   03/05/17 0602 100/51 98.3 °F (36.8 °C) 79 16 100 %   03/05/17 0600 100/51 98.1 °F (36.7 °C) 87 15 100 %   03/05/17 0545 121/67 98 °F (36.7 °C) 96 19 99 %   03/05/17 0542 121/90 - (!) 110 17 -   03/05/17 0541 121/90 98.4 °F (36.9 °C) - 22 99 %   03/05/17 0400 116/59 98.1 °F (36.7 °C) 86 13 100 %   03/05/17 0323 104/61 98 °F (36.7 °C) 83 17 100 %       Physical Exam:   Visit Vitals    /80    Pulse 84    Temp 98.2 °F (36.8 °C)    Resp 17    Ht 4' 10\" (1.473 m)    Wt 54.4 kg (120 lb)    SpO2 100%    BMI 25.08 kg/m2     General appearance: alert, cooperative, no distress, appears stated age  Eyes: negative findings: anicteric sclera, lids and lashes normal and conjunctivae and sclerae normal  Lungs: clear to auscultation bilaterally  Abdomen: soft, non-tender. Bowel sounds normal. No masses,  no organomegaly  Extremities: edema none  Skin: Skin color, texture, turgor normal. No rashes or lesions    Lab/Data Review:  Recent Results (from the past 24 hour(s))   EKG, 12 LEAD, INITIAL    Collection Time: 03/04/17  9:21 PM   Result Value Ref Range    Ventricular Rate 111 BPM    Atrial Rate 111 BPM    P-R Interval 126 ms    QRS Duration 72 ms    Q-T Interval 328 ms    QTC Calculation (Bezet) 446 ms    Calculated P Axis 72 degrees    Calculated R Axis 20 degrees    Calculated T Axis 2 degrees    Diagnosis       Sinus tachycardia  Nonspecific ST abnormality  Abnormal ECG  When compared with ECG of 03-JUN-2015 11:10,  No significant change was found     CBC WITH AUTOMATED DIFF    Collection Time: 03/04/17  9:42 PM   Result Value Ref Range    WBC 12.0 (H) 3.6 - 11.0 K/uL    RBC 2.34 (L) 3.80 - 5.20 M/uL    HGB 7.5 (L) 11.5 - 16.0 g/dL    HCT 23.5 (L) 35.0 - 47.0 %    .4 (H) 80.0 - 99.0 FL    MCH 32.1 26.0 - 34.0 PG    MCHC 31.9 30.0 - 36.5 g/dL    RDW 13.9 11.5 - 14.5 %    PLATELET 017 236 - 402 K/uL    NEUTROPHILS 80 (H) 32 - 75 %    LYMPHOCYTES 13 12 - 49 %    MONOCYTES 6 5 - 13 %    EOSINOPHILS 1 0 - 7 %    BASOPHILS 0 0 - 1 %    ABS. NEUTROPHILS 9.5 (H) 1.8 - 8.0 K/UL    ABS. LYMPHOCYTES 1.6 0.8 - 3.5 K/UL    ABS. MONOCYTES 0.8 0.0 - 1.0 K/UL    ABS. EOSINOPHILS 0.2 0.0 - 0.4 K/UL    ABS. BASOPHILS 0.1 0.0 - 0.1 K/UL   CK W/ CKMB & INDEX    Collection Time: 03/04/17  9:42 PM   Result Value Ref Range    CK 48 26 - 192 U/L    CK - MB <1.0 <3.6 NG/ML    CK-MB Index Cannot be calulated 0 - 2.5     METABOLIC PANEL, COMPREHENSIVE    Collection Time: 03/04/17  9:42 PM   Result Value Ref Range    Sodium 137 136 - 145 mmol/L    Potassium 3.6 3.5 - 5.1 mmol/L    Chloride 101 97 - 108 mmol/L    CO2 22 21 - 32 mmol/L    Anion gap 14 5 - 15 mmol/L    Glucose 225 (H) 65 - 100 mg/dL    BUN 36 (H) 6 - 20 MG/DL    Creatinine 0.79 0.55 - 1.02 MG/DL    BUN/Creatinine ratio 46 (H) 12 - 20      GFR est AA >60 >60 ml/min/1.73m2    GFR est non-AA >60 >60 ml/min/1.73m2    Calcium 8.3 (L) 8.5 - 10.1 MG/DL    Bilirubin, total 0.3 0.2 - 1.0 MG/DL    ALT (SGPT) 35 12 - 78 U/L    AST (SGOT) 34 15 - 37 U/L    Alk.  phosphatase 100 45 - 117 U/L    Protein, total 6.0 (L) 6.4 - 8.2 g/dL    Albumin 3.0 (L) 3.5 - 5.0 g/dL    Globulin 3.0 2.0 - 4.0 g/dL    A-G Ratio 1.0 (L) 1.1 - 2.2     PROTHROMBIN TIME + INR    Collection Time: 03/04/17  9:42 PM   Result Value Ref Range    INR 1.2 (H) 0.9 - 1.1      Prothrombin time 12.1 (H) 9.0 - 11.1 sec   PTT    Collection Time: 03/04/17  9:42 PM   Result Value Ref Range    aPTT 23.6 22.1 - 32.5 sec    aPTT, therapeutic range     58.0 - 77.0 SECS   LIPASE    Collection Time: 03/04/17  9:42 PM   Result Value Ref Range    Lipase 148 73 - 393 U/L   AMYLASE    Collection Time: 03/04/17  9:42 PM   Result Value Ref Range    Amylase 37 25 - 115 U/L   TROPONIN I    Collection Time: 03/04/17  9:42 PM   Result Value Ref Range    Troponin-I, Qt. <0.04 <0.05 ng/mL   TYPE & CROSSMATCH    Collection Time: 03/04/17  9:42 PM   Result Value Ref Range    Crossmatch Expiration 03/07/2017     ABO/Rh(D) O POSITIVE     Antibody screen NEG     Unit number [id number]     Blood component type RC LR AS1 Unit division 00     Status of unit ISSUED     Crossmatch result Compatible     Unit number [id number]     Blood component type RC LR AS1     Unit division 00     Status of unit ISSUED     Crossmatch result Compatible     Unit number [id number]     Blood component type RC LR AS1     Unit division 00     Status of unit ALLOCATED     Crossmatch result Compatible     Unit number [id number]     Blood component type RC LR AS1     Unit division 00     Status of unit ALLOCATED     Crossmatch result Compatible    OCCULT BLOOD, STOOL    Collection Time: 03/04/17  9:56 PM   Result Value Ref Range    Occult blood, stool POSITIVE (A) NEG     URINALYSIS W/ REFLEX CULTURE    Collection Time: 03/05/17  3:02 AM   Result Value Ref Range    Color YELLOW/STRAW      Appearance CLEAR CLEAR      Specific gravity 1.014 1.003 - 1.030      pH (UA) 6.0 5.0 - 8.0      Protein NEGATIVE  NEG mg/dL    Glucose NEGATIVE  NEG mg/dL    Ketone NEGATIVE  NEG mg/dL    Bilirubin NEGATIVE  NEG      Blood NEGATIVE  NEG      Urobilinogen 0.2 0.2 - 1.0 EU/dL    Nitrites NEGATIVE  NEG      Leukocyte Esterase TRACE (A) NEG      WBC 5-10 0 - 4 /hpf    RBC 0-5 0 - 5 /hpf    Epithelial cells FEW FEW /lpf    Bacteria NEGATIVE  NEG /hpf    UA:UC IF INDICATED URINE CULTURE ORDERED (A) CNI     DRUG SCREEN, URINE    Collection Time: 03/05/17  3:03 AM   Result Value Ref Range    AMPHETAMINE NEGATIVE  NEG      BARBITURATES NEGATIVE  NEG      BENZODIAZEPINE NEGATIVE  NEG      COCAINE NEGATIVE  NEG      METHADONE NEGATIVE  NEG      OPIATES NEGATIVE  NEG      PCP(PHENCYCLIDINE) NEGATIVE  NEG      THC (TH-CANNABINOL) NEGATIVE  NEG      Drug screen comment (NOTE)          Assessment:     Principal Problem:    GI bleed (3/4/2017)    Active Problems:    Hypothyroidism (6/3/2015)      Depression (6/3/2015)      Alcohol abuse (6/3/2015)      Acute blood loss anemia (3/4/2017)        Plan:   Upper endoscopy with Dr Kyle Tucker tomorrow  AM labs  Signed By: Errol Beasley MD March 5, 2017

## 2017-03-06 ENCOUNTER — ANESTHESIA (OUTPATIENT)
Dept: ENDOSCOPY | Age: 52
DRG: 378 | End: 2017-03-06
Payer: MEDICARE

## 2017-03-06 ENCOUNTER — ANESTHESIA EVENT (OUTPATIENT)
Dept: ENDOSCOPY | Age: 52
DRG: 378 | End: 2017-03-06
Payer: MEDICARE

## 2017-03-06 LAB
ALBUMIN SERPL BCP-MCNC: 2.7 G/DL (ref 3.5–5)
ALBUMIN/GLOB SERPL: 1 {RATIO} (ref 1.1–2.2)
ALP SERPL-CCNC: 63 U/L (ref 45–117)
ALT SERPL-CCNC: 25 U/L (ref 12–78)
ANION GAP BLD CALC-SCNC: 11 MMOL/L (ref 5–15)
ANION GAP BLD CALC-SCNC: 9 MMOL/L (ref 5–15)
AST SERPL W P-5'-P-CCNC: 36 U/L (ref 15–37)
BACTERIA SPEC CULT: NORMAL
BASOPHILS # BLD AUTO: 0 K/UL (ref 0–0.1)
BASOPHILS # BLD: 0 % (ref 0–1)
BILIRUB SERPL-MCNC: 0.3 MG/DL (ref 0.2–1)
BUN SERPL-MCNC: 10 MG/DL (ref 6–20)
BUN SERPL-MCNC: 4 MG/DL (ref 6–20)
BUN/CREAT SERPL: 19 (ref 12–20)
BUN/CREAT SERPL: 8 (ref 12–20)
CALCIUM SERPL-MCNC: 7.4 MG/DL (ref 8.5–10.1)
CALCIUM SERPL-MCNC: 8 MG/DL (ref 8.5–10.1)
CC UR VC: NORMAL
CHLORIDE SERPL-SCNC: 107 MMOL/L (ref 97–108)
CHLORIDE SERPL-SCNC: 112 MMOL/L (ref 97–108)
CO2 SERPL-SCNC: 21 MMOL/L (ref 21–32)
CO2 SERPL-SCNC: 23 MMOL/L (ref 21–32)
CREAT SERPL-MCNC: 0.49 MG/DL (ref 0.55–1.02)
CREAT SERPL-MCNC: 0.54 MG/DL (ref 0.55–1.02)
EOSINOPHIL # BLD: 0.2 K/UL (ref 0–0.4)
EOSINOPHIL NFR BLD: 4 % (ref 0–7)
ERYTHROCYTE [DISTWIDTH] IN BLOOD BY AUTOMATED COUNT: 16.7 % (ref 11.5–14.5)
GLOBULIN SER CALC-MCNC: 2.6 G/DL (ref 2–4)
GLUCOSE SERPL-MCNC: 94 MG/DL (ref 65–100)
GLUCOSE SERPL-MCNC: 95 MG/DL (ref 65–100)
H PYLORI FROM TISSUE: NEGATIVE
HCT VFR BLD AUTO: 25.7 % (ref 35–47)
HGB BLD-MCNC: 8.7 G/DL (ref 11.5–16)
KIT LOT NO., HCLOLOT: NORMAL
LYMPHOCYTES # BLD AUTO: 35 % (ref 12–49)
LYMPHOCYTES # BLD: 1.4 K/UL (ref 0.8–3.5)
MCH RBC QN AUTO: 31.4 PG (ref 26–34)
MCHC RBC AUTO-ENTMCNC: 33.9 G/DL (ref 30–36.5)
MCV RBC AUTO: 92.8 FL (ref 80–99)
MONOCYTES # BLD: 0.3 K/UL (ref 0–1)
MONOCYTES NFR BLD AUTO: 8 % (ref 5–13)
NEGATIVE CONTROL: NEGATIVE
NEUTS SEG # BLD: 2.1 K/UL (ref 1.8–8)
NEUTS SEG NFR BLD AUTO: 53 % (ref 32–75)
PLATELET # BLD AUTO: 143 K/UL (ref 150–400)
POSITIVE CONTROL: POSITIVE
POTASSIUM SERPL-SCNC: 2.9 MMOL/L (ref 3.5–5.1)
POTASSIUM SERPL-SCNC: 3.7 MMOL/L (ref 3.5–5.1)
PROT SERPL-MCNC: 5.3 G/DL (ref 6.4–8.2)
RBC # BLD AUTO: 2.77 M/UL (ref 3.8–5.2)
SERVICE CMNT-IMP: NORMAL
SODIUM SERPL-SCNC: 139 MMOL/L (ref 136–145)
SODIUM SERPL-SCNC: 144 MMOL/L (ref 136–145)
WBC # BLD AUTO: 4 K/UL (ref 3.6–11)

## 2017-03-06 PROCEDURE — 74011000250 HC RX REV CODE- 250

## 2017-03-06 PROCEDURE — 76060000031 HC ANESTHESIA FIRST 0.5 HR: Performed by: INTERNAL MEDICINE

## 2017-03-06 PROCEDURE — 74011250636 HC RX REV CODE- 250/636: Performed by: INTERNAL MEDICINE

## 2017-03-06 PROCEDURE — 77030009426 HC FCPS BIOP ENDOSC BSC -B: Performed by: INTERNAL MEDICINE

## 2017-03-06 PROCEDURE — C9113 INJ PANTOPRAZOLE SODIUM, VIA: HCPCS | Performed by: INTERNAL MEDICINE

## 2017-03-06 PROCEDURE — 65660000000 HC RM CCU STEPDOWN

## 2017-03-06 PROCEDURE — 85025 COMPLETE CBC W/AUTO DIFF WBC: CPT | Performed by: INTERNAL MEDICINE

## 2017-03-06 PROCEDURE — 74011250636 HC RX REV CODE- 250/636: Performed by: FAMILY MEDICINE

## 2017-03-06 PROCEDURE — 74011250637 HC RX REV CODE- 250/637: Performed by: FAMILY MEDICINE

## 2017-03-06 PROCEDURE — 74011250636 HC RX REV CODE- 250/636

## 2017-03-06 PROCEDURE — 36415 COLL VENOUS BLD VENIPUNCTURE: CPT | Performed by: INTERNAL MEDICINE

## 2017-03-06 PROCEDURE — 80053 COMPREHEN METABOLIC PANEL: CPT | Performed by: INTERNAL MEDICINE

## 2017-03-06 PROCEDURE — 0DB78ZX EXCISION OF STOMACH, PYLORUS, VIA NATURAL OR ARTIFICIAL OPENING ENDOSCOPIC, DIAGNOSTIC: ICD-10-PCS | Performed by: INTERNAL MEDICINE

## 2017-03-06 PROCEDURE — 87077 CULTURE AEROBIC IDENTIFY: CPT | Performed by: INTERNAL MEDICINE

## 2017-03-06 PROCEDURE — 80048 BASIC METABOLIC PNL TOTAL CA: CPT | Performed by: FAMILY MEDICINE

## 2017-03-06 PROCEDURE — 77030012893

## 2017-03-06 PROCEDURE — 74011000250 HC RX REV CODE- 250: Performed by: INTERNAL MEDICINE

## 2017-03-06 PROCEDURE — 76040000019: Performed by: INTERNAL MEDICINE

## 2017-03-06 RX ORDER — SODIUM CHLORIDE 9 MG/ML
50 INJECTION, SOLUTION INTRAVENOUS CONTINUOUS
Status: DISPENSED | OUTPATIENT
Start: 2017-03-06 | End: 2017-03-06

## 2017-03-06 RX ORDER — FLUMAZENIL 0.1 MG/ML
0.2 INJECTION INTRAVENOUS
Status: DISCONTINUED | OUTPATIENT
Start: 2017-03-06 | End: 2017-03-06 | Stop reason: HOSPADM

## 2017-03-06 RX ORDER — MIDAZOLAM HYDROCHLORIDE 1 MG/ML
.25-5 INJECTION, SOLUTION INTRAMUSCULAR; INTRAVENOUS
Status: DISCONTINUED | OUTPATIENT
Start: 2017-03-06 | End: 2017-03-06 | Stop reason: HOSPADM

## 2017-03-06 RX ORDER — DEXTROMETHORPHAN/PSEUDOEPHED 2.5-7.5/.8
1.2 DROPS ORAL
Status: DISCONTINUED | OUTPATIENT
Start: 2017-03-06 | End: 2017-03-06 | Stop reason: HOSPADM

## 2017-03-06 RX ORDER — NALOXONE HYDROCHLORIDE 0.4 MG/ML
0.4 INJECTION, SOLUTION INTRAMUSCULAR; INTRAVENOUS; SUBCUTANEOUS
Status: DISCONTINUED | OUTPATIENT
Start: 2017-03-06 | End: 2017-03-06 | Stop reason: HOSPADM

## 2017-03-06 RX ORDER — PANTOPRAZOLE SODIUM 40 MG/1
40 TABLET, DELAYED RELEASE ORAL
Status: DISCONTINUED | OUTPATIENT
Start: 2017-03-07 | End: 2017-03-07 | Stop reason: HOSPADM

## 2017-03-06 RX ORDER — ATROPINE SULFATE 0.1 MG/ML
0.4 INJECTION INTRAVENOUS
Status: DISCONTINUED | OUTPATIENT
Start: 2017-03-06 | End: 2017-03-06 | Stop reason: HOSPADM

## 2017-03-06 RX ORDER — POTASSIUM CHLORIDE 7.45 MG/ML
10 INJECTION INTRAVENOUS
Status: COMPLETED | OUTPATIENT
Start: 2017-03-06 | End: 2017-03-06

## 2017-03-06 RX ORDER — EPINEPHRINE 0.1 MG/ML
1 INJECTION INTRACARDIAC; INTRAVENOUS
Status: DISCONTINUED | OUTPATIENT
Start: 2017-03-06 | End: 2017-03-06 | Stop reason: HOSPADM

## 2017-03-06 RX ORDER — PROPOFOL 10 MG/ML
INJECTION, EMULSION INTRAVENOUS AS NEEDED
Status: DISCONTINUED | OUTPATIENT
Start: 2017-03-06 | End: 2017-03-06 | Stop reason: HOSPADM

## 2017-03-06 RX ORDER — LIDOCAINE HYDROCHLORIDE 20 MG/ML
INJECTION, SOLUTION EPIDURAL; INFILTRATION; INTRACAUDAL; PERINEURAL AS NEEDED
Status: DISCONTINUED | OUTPATIENT
Start: 2017-03-06 | End: 2017-03-06 | Stop reason: HOSPADM

## 2017-03-06 RX ADMIN — Medication 10 ML: at 14:00

## 2017-03-06 RX ADMIN — POTASSIUM CHLORIDE 10 MEQ: 10 INJECTION, SOLUTION INTRAVENOUS at 09:11

## 2017-03-06 RX ADMIN — PROPOFOL 50 MG: 10 INJECTION, EMULSION INTRAVENOUS at 17:12

## 2017-03-06 RX ADMIN — SODIUM CHLORIDE 125 ML/HR: 900 INJECTION, SOLUTION INTRAVENOUS at 13:40

## 2017-03-06 RX ADMIN — Medication 10 ML: at 06:24

## 2017-03-06 RX ADMIN — POTASSIUM CHLORIDE 10 MEQ: 10 INJECTION, SOLUTION INTRAVENOUS at 11:44

## 2017-03-06 RX ADMIN — POTASSIUM CHLORIDE 10 MEQ: 10 INJECTION, SOLUTION INTRAVENOUS at 10:17

## 2017-03-06 RX ADMIN — SODIUM CHLORIDE 125 ML/HR: 900 INJECTION, SOLUTION INTRAVENOUS at 05:29

## 2017-03-06 RX ADMIN — PROPOFOL 50 MG: 10 INJECTION, EMULSION INTRAVENOUS at 17:10

## 2017-03-06 RX ADMIN — PROPOFOL 50 MG: 10 INJECTION, EMULSION INTRAVENOUS at 17:08

## 2017-03-06 RX ADMIN — SODIUM CHLORIDE 50 ML/HR: 900 INJECTION, SOLUTION INTRAVENOUS at 17:56

## 2017-03-06 RX ADMIN — Medication 10 ML: at 22:04

## 2017-03-06 RX ADMIN — LIDOCAINE HYDROCHLORIDE 40 MG: 20 INJECTION, SOLUTION EPIDURAL; INFILTRATION; INTRACAUDAL; PERINEURAL at 17:08

## 2017-03-06 RX ADMIN — DULOXETINE HYDROCHLORIDE 120 MG: 30 CAPSULE, DELAYED RELEASE ORAL at 09:02

## 2017-03-06 RX ADMIN — SODIUM CHLORIDE 50 ML/HR: 900 INJECTION, SOLUTION INTRAVENOUS at 15:44

## 2017-03-06 RX ADMIN — POTASSIUM CHLORIDE 10 MEQ: 10 INJECTION, SOLUTION INTRAVENOUS at 12:55

## 2017-03-06 RX ADMIN — SODIUM CHLORIDE 40 MG: 9 INJECTION INTRAMUSCULAR; INTRAVENOUS; SUBCUTANEOUS at 09:00

## 2017-03-06 NOTE — PERIOP NOTES
TRANSFER - OUT REPORT:    Verbal report given to Cookie Mcdowell RN(name) on Sanna Champion  being transferred to [facility](unit) for routine post - op       Report consisted of patients Situation, Background, Assessment and   Recommendations(SBAR). Information from the following report(s) SBAR, MAR, Recent Results and Cardiac Rhythm NSR was reviewed with the receiving nurse. Lines:   Peripheral IV 03/04/17 Right Antecubital (Active)   Site Assessment Clean, dry, & intact 3/6/2017  8:56 AM   Phlebitis Assessment 0 3/6/2017  8:56 AM   Infiltration Assessment 0 3/6/2017  8:56 AM   Dressing Status Clean, dry, & intact 3/6/2017  8:56 AM   Dressing Type Transparent 3/6/2017  8:56 AM   Hub Color/Line Status Pink; Infusing 3/6/2017  8:56 AM   Action Taken Open ports on tubing capped 3/6/2017  8:56 AM   Alcohol Cap Used Yes 3/6/2017  8:56 AM       Peripheral IV 03/04/17 Left Hand (Active)   Site Assessment Clean, dry, & intact 3/6/2017  8:56 AM   Phlebitis Assessment 0 3/6/2017  8:56 AM   Infiltration Assessment 0 3/6/2017  8:56 AM   Dressing Status Clean, dry, & intact 3/6/2017  8:56 AM   Dressing Type Transparent 3/6/2017  8:56 AM   Hub Color/Line Status Blue;Capped 3/6/2017  8:56 AM   Action Taken Open ports on tubing capped 3/6/2017  8:56 AM   Alcohol Cap Used Yes 3/6/2017  8:56 AM        Opportunity for questions and clarification was provided.       Patient transported with:   tele monitor, chart

## 2017-03-06 NOTE — PROGRESS NOTES
Fantasma Ross Rushing about low potassium this morning, will order supplements on rounds. 0730  Bedside and Verbal shift change report given to [facility] (oncoming nurse) by Marcia Ashley (offgoing nurse). Report included the following information SBAR, Kardex, MAR and Recent Results.

## 2017-03-06 NOTE — PROGRESS NOTES
Bedside and Verbal shift change report given to Dena (oncoming nurse) by Chepe Wilkes (offgoing nurse). Report included the following information SBAR, Kardex, ED Summary, Procedure Summary, MAR, Recent Results and Cardiac Rhythm nsr. 9293 Dr May Song at bedside, notified of K 2.9, orders for K IVPB 10mEq. Grey Bishop from endo notified. Verbal orders taken from MD to do stat BMP 1 hour after K infusions. 1212 pt c/o pain at IV site, no redness, IV patent, no infiltration. K slowed to 75 mL/hr, pt states much improved. Will monitor. 1409 STAT BMP ordered for 1430, phlebotomy notified    1430 blood drawn, sent to lab STAT    1511 pt K is 3.7, Grey Pattee in endo notified. Pt off floor to endo, tele notified. Pt sent down on box 39    6934 order noted for reg diet, po protonix. Report received from April in endo, 2 small ulcers, h pylori samples pending, 500 ml saline bolus, propofol, pt will arrive shortly    1800 Dr Luz Maria Marrero notified of pt dc from Dr Rodolfo Leon, pt to be dcd in am    1857 h. Pylori result negative    Bedside and Verbal shift change report given to Anita (oncoming nurse) by Osiris Grossman (offgoing nurse). Report included the following information SBAR, Kardex, Procedure Summary, Intake/Output, MAR, Recent Results and Cardiac Rhythm nsr.

## 2017-03-06 NOTE — PROGRESS NOTES
I attempted to see the patient, she was off of the unit. I will follow up at a later time.  Thanks JAXSON Wiley

## 2017-03-06 NOTE — PROGRESS NOTES
Daily Progress Note: 3/6/2017  Irene Murry MD    Assessment/Plan:   GI bleed (3/4/2017)  - upper vs lower bleed, unclear at this time  - close monitoring in StepDown unit  - IV PPI to continue  - GI consult  - NPO, IV hydration     Hypothyroidism (6/3/2015)  - not on LT4  - check TSH in AM     Depression (6/3/2015)  - continue Cymbalta     Alcohol abuse (6/3/2015)  - counseled on cessation  - reports she does not drink every day, should be low risk for DTs     Acute blood loss anemia (3/4/2017)  - POA, due to acute GI blood loss over last few days  - receiving 2 units already ordered in ED  - follow Hgb closely, transfuse as need to maintain Hgb >8    Code status:  - patient is FULL CODE           Problem List:  Problem List as of 3/6/2017  Date Reviewed: 3/5/2017          Codes Class Noted - Resolved    * (Principal)GI bleed ICD-10-CM: K92.2  ICD-9-CM: 578.9  3/4/2017 - Present        Acute blood loss anemia ICD-10-CM: D62  ICD-9-CM: 285.1  3/4/2017 - Present        Hypothyroidism (Chronic) ICD-10-CM: E03.9  ICD-9-CM: 244.9  6/3/2015 - Present        Depression (Chronic) ICD-10-CM: F32.9  ICD-9-CM: 311  6/3/2015 - Present        Microcytic anemia (Chronic) ICD-10-CM: D50.9  ICD-9-CM: 280.9  6/3/2015 - Present        Alcohol abuse (Chronic) ICD-10-CM: F10.10  ICD-9-CM: 305.00  6/3/2015 - Present        RESOLVED: SOB (shortness of breath) ICD-10-CM: R06.02  ICD-9-CM: 786.05  6/3/2015 - 3/4/2017              Subjective:    46 y.o.  female who is admitted with GI bleed. Ms. Holcomb presented to the Emergency Department this PM complaining of GI bleed: started 3 days ago, intermittent, dark blood per rectum, associated with SOB  History obtained from chart review and the patient. Previous records reviewed ( Dr Jess Bundy)    3/6:  No complaints this AM.  No further bleeding. K+ low. Hb 8.7. Scopes per GI today.       Review of Systems:   A comprehensive review of systems was negative except for that written in the HPI. Objective:   Physical Exam:     Visit Vitals    /64 (BP 1 Location: Left arm, BP Patient Position: At rest)    Pulse 99    Temp 98 °F (36.7 °C)    Resp 21    Ht 4' 10\" (1.473 m)    Wt 54.4 kg (120 lb)    SpO2 100%    BMI 25.08 kg/m2    O2 Flow Rate (L/min): 2 l/min O2 Device: Room air    Temp (24hrs), Av.2 °F (36.8 °C), Min:98 °F (36.7 °C), Max:98.5 °F (36.9 °C)         190 -  0700  In: 1083.3 [P.O.:720]  Out: 4028 [Urine:4175]    General:  Alert, cooperative, no distress, appears stated age. Head:  Normocephalic, without obvious abnormality, atraumatic. Eyes:  Conjunctivae/corneas clear. PERRL, EOMs intact. Throat: Lips, mucosa, and tongue moist..   Neck: Supple, symmetrical, trachea midline, no adenopathy, thyroid: no enlargement/tenderness/nodules, no carotid bruit and no JVD. Back:   Symmetric, no curvature. ROM normal. No CVA tenderness. Lungs:   Clear to auscultation bilaterally. Chest wall:  No tenderness or deformity. Heart:  Regular rate and rhythm, S1, S2 normal, no murmur, click, rub or gallop. Abdomen:   Soft, non-tender. Bowel sounds normal. No masses,  No organomegaly. Extremities: no cyanosis or edema. No calf tenderness or cords. Skin:  turgor normal.   Neurologic: CNII-XII intact. Alert and oriented X 3. Fine motor of hands and fingers normal.   equal.  No cogwheeling or rigidity. Gait not tested at this time. Sensation grossly normal to touch.   Gross motor of extremities normal.       Data Review:       Recent Days:  Recent Labs      17   04517   0928  17   2142   WBC  4.0  4.5  12.0*   HGB  8.7*  8.9*  7.5*   HCT  25.7*  26.5*  23.5*   PLT  143*  123*  250     Recent Labs      17   0454  17   0928  17   2142   NA  144  142  137   K  2.9*  3.1*  3.6   CL  112*  110*  101   CO2  21  24  22   GLU  94  93  225*   BUN  4*  15  36*   CREA  0.49*  0.56  0.79 CA  7.4*  7.3*  8.3*   MG   --   1.6   --    PHOS   --   2.2*   --    ALB  2.7*   --   3.0*   TBILI  0.3   --   0.3   SGOT  36   --   34   ALT  25   --   35   INR   --    --   1.2*     No results for input(s): PH, PCO2, PO2, HCO3, FIO2 in the last 72 hours.     24 Hour Results:  Recent Results (from the past 24 hour(s))   ETHYL ALCOHOL    Collection Time: 03/05/17  9:28 AM   Result Value Ref Range    ALCOHOL(ETHYL),SERUM <10 <10 MG/DL   CBC W/O DIFF    Collection Time: 03/05/17  9:28 AM   Result Value Ref Range    WBC 4.5 3.6 - 11.0 K/uL    RBC 2.87 (L) 3.80 - 5.20 M/uL    HGB 8.9 (L) 11.5 - 16.0 g/dL    HCT 26.5 (L) 35.0 - 47.0 %    MCV 92.3 80.0 - 99.0 FL    MCH 31.0 26.0 - 34.0 PG    MCHC 33.6 30.0 - 36.5 g/dL    RDW 16.0 (H) 11.5 - 14.5 %    PLATELET 249 (L) 991 - 400 K/uL   MAGNESIUM    Collection Time: 03/05/17  9:28 AM   Result Value Ref Range    Magnesium 1.6 1.6 - 2.4 mg/dL   PHOSPHORUS    Collection Time: 03/05/17  9:28 AM   Result Value Ref Range    Phosphorus 2.2 (L) 2.6 - 4.7 MG/DL   METABOLIC PANEL, BASIC    Collection Time: 03/05/17  9:28 AM   Result Value Ref Range    Sodium 142 136 - 145 mmol/L    Potassium 3.1 (L) 3.5 - 5.1 mmol/L    Chloride 110 (H) 97 - 108 mmol/L    CO2 24 21 - 32 mmol/L    Anion gap 8 5 - 15 mmol/L    Glucose 93 65 - 100 mg/dL    BUN 15 6 - 20 MG/DL    Creatinine 0.56 0.55 - 1.02 MG/DL    BUN/Creatinine ratio 27 (H) 12 - 20      GFR est AA >60 >60 ml/min/1.73m2    GFR est non-AA >60 >60 ml/min/1.73m2    Calcium 7.3 (L) 8.5 - 10.1 MG/DL   TSH 3RD GENERATION    Collection Time: 03/05/17  9:28 AM   Result Value Ref Range    TSH 3.18 0.36 - 3.74 uIU/mL   HEMOGLOBIN A1C WITH EAG    Collection Time: 03/05/17  9:28 AM   Result Value Ref Range    Hemoglobin A1c 4.6 4.2 - 6.3 %    Est. average glucose Cannot be calulated mg/dL   METABOLIC PANEL, COMPREHENSIVE    Collection Time: 03/06/17  4:54 AM   Result Value Ref Range    Sodium 144 136 - 145 mmol/L    Potassium 2.9 (L) 3.5 - 5.1 mmol/L    Chloride 112 (H) 97 - 108 mmol/L    CO2 21 21 - 32 mmol/L    Anion gap 11 5 - 15 mmol/L    Glucose 94 65 - 100 mg/dL    BUN 4 (L) 6 - 20 MG/DL    Creatinine 0.49 (L) 0.55 - 1.02 MG/DL    BUN/Creatinine ratio 8 (L) 12 - 20      GFR est AA >60 >60 ml/min/1.73m2    GFR est non-AA >60 >60 ml/min/1.73m2    Calcium 7.4 (L) 8.5 - 10.1 MG/DL    Bilirubin, total 0.3 0.2 - 1.0 MG/DL    ALT (SGPT) 25 12 - 78 U/L    AST (SGOT) 36 15 - 37 U/L    Alk. phosphatase 63 45 - 117 U/L    Protein, total 5.3 (L) 6.4 - 8.2 g/dL    Albumin 2.7 (L) 3.5 - 5.0 g/dL    Globulin 2.6 2.0 - 4.0 g/dL    A-G Ratio 1.0 (L) 1.1 - 2.2     CBC WITH AUTOMATED DIFF    Collection Time: 03/06/17  4:54 AM   Result Value Ref Range    WBC 4.0 3.6 - 11.0 K/uL    RBC 2.77 (L) 3.80 - 5.20 M/uL    HGB 8.7 (L) 11.5 - 16.0 g/dL    HCT 25.7 (L) 35.0 - 47.0 %    MCV 92.8 80.0 - 99.0 FL    MCH 31.4 26.0 - 34.0 PG    MCHC 33.9 30.0 - 36.5 g/dL    RDW 16.7 (H) 11.5 - 14.5 %    PLATELET 114 (L) 595 - 400 K/uL    NEUTROPHILS 53 32 - 75 %    LYMPHOCYTES 35 12 - 49 %    MONOCYTES 8 5 - 13 %    EOSINOPHILS 4 0 - 7 %    BASOPHILS 0 0 - 1 %    ABS. NEUTROPHILS 2.1 1.8 - 8.0 K/UL    ABS. LYMPHOCYTES 1.4 0.8 - 3.5 K/UL    ABS. MONOCYTES 0.3 0.0 - 1.0 K/UL    ABS. EOSINOPHILS 0.2 0.0 - 0.4 K/UL    ABS.  BASOPHILS 0.0 0.0 - 0.1 K/UL       Medications reviewed  Current Facility-Administered Medications   Medication Dose Route Frequency    potassium chloride 10 mEq in 100 ml IVPB  10 mEq IntraVENous Q1H    pantoprazole (PROTONIX) 40 mg in sodium chloride 0.9 % 10 mL injection  40 mg IntraVENous Q12H    0.9% sodium chloride infusion  125 mL/hr IntraVENous CONTINUOUS    sodium chloride (NS) flush 5-10 mL  5-10 mL IntraVENous Q8H    sodium chloride (NS) flush 5-10 mL  5-10 mL IntraVENous PRN    acetaminophen (TYLENOL) tablet 650 mg  650 mg Oral Q4H PRN    DULoxetine (CYMBALTA) capsule 120 mg  120 mg Oral DAILY    0.9% sodium chloride infusion 250 mL  250 mL IntraVENous PRN       Care Plan discussed with: Patient and Nurse    Total time spent with patient: 30 minutes.     Cristina Galdamez MD

## 2017-03-06 NOTE — PERIOP NOTES
Keegan Johnson  1965  [id number]    Situation:    Scheduled Procedure: Procedure(s):  ESOPHAGOGASTRODUODENOSCOPY (EGD)  Verbal report received from: Albertina Machado RN  Preoperative diagnosis: melena    Background:    Procedure: Procedure(s):  ESOPHAGOGASTRODUODENOSCOPY (EGD)  Physician performing procedure; Dr. Valdo Matias RN    NPO Status/Last PO Intake: 12 midnight    Pregnancy Test: NO If yes, result: NONE    Is the patient taking Blood Thinners: NO If yes, list:  and last taken   Is the patient diabetic:no       If yes, what was the last BS:    Time taken? Anything given? no           Does the patient have a Pacemaker/Defibrillator in place?: no   Does the patient need antibiotics before/during/after procedure: no   If the patient is having a colon, How much prep was drank? NA   What were the Colon prep results? NA   Does the patient have SCD in place:no   Is patient on CONTACT precautions:no        If yes, what kind of CONTACT precautions:     Assessment:  Are the vital signs stable prior to patient coming to ENDO?  yes  Is the patient alert/oriented and able to sign consent for the procedures:yes  How does the patient's abdomen feel prior to coming to ENDO? round and soft yes   Does the patient have a patient IV in place?  yes     Recommendation:  Family or Friend present no     Permission to share finding with Family or Friend yes

## 2017-03-06 NOTE — PROGRESS NOTES
Bedside and Verbal shift change report given to Keren Cortez (oncoming nurse) by Cara Osorio (offgoing nurse). Report included the following information SBAR, Kardex, MAR and Recent Results.     Shift Summary  2022: Assessed patient and noted several psoriasis patches on arms, legs, and feet  0012: Patient resting quietly watching t.v.  0430 : assisted patient to bedside commode

## 2017-03-06 NOTE — ANESTHESIA PREPROCEDURE EVALUATION
Anesthetic History   No history of anesthetic complications            Review of Systems / Medical History  Patient summary reviewed, nursing notes reviewed and pertinent labs reviewed    Pulmonary            Asthma        Neuro/Psych     seizures (has had multiple, but none in 10 years and not on medication to prevent it)    Psychiatric history     Cardiovascular  Within defined limits                Exercise tolerance: >4 METS  Comments: Not on beta blocker   GI/Hepatic/Renal  Within defined limits              Endo/Other      Hypothyroidism  Anemia     Other Findings   Comments: melena    etoh use           Physical Exam    Airway  Mallampati: II  TM Distance: < 4 cm  Neck ROM: normal range of motion   Mouth opening: Normal     Cardiovascular  Regular rate and rhythm,  S1 and S2 normal,  no murmur, click, rub, or gallop  Rhythm: regular  Rate: normal         Dental    Dentition: Poor dentition  Comments: Very poor dentition including the top front   Pulmonary  Breath sounds clear to auscultation               Abdominal  GI exam deferred       Other Findings            Anesthetic Plan    ASA: 2  Anesthesia type: MAC            Anesthetic plan and risks discussed with: Patient

## 2017-03-06 NOTE — PROGRESS NOTES
Bedside and Verbal shift change report given to Anita-RN (oncoming nurse) by Marisabel-RN (offgoing nurse). Report included the following information SBAR, Kardex, MAR and Recent Results.

## 2017-03-06 NOTE — ANESTHESIA POSTPROCEDURE EVALUATION
Post-Anesthesia Evaluation and Assessment    Patient: Fidelina De La Rosa MRN: [id number]  SSN: [id number]    YOB: 1965  Age: 46 y.o. Sex: female       Cardiovascular Function/Vital Signs  Visit Vitals    /57    Pulse 85    Temp 37.3 °C (99.2 °F)    Resp 20    Ht 4' 10\" (1.473 m)    Wt 54.4 kg (120 lb)    SpO2 97%    Breastfeeding No    BMI 25.08 kg/m2       Patient is status post MAC anesthesia for Procedure(s):  ESOPHAGOGASTRODUODENOSCOPY (EGD)  ESOPHAGOGASTRODUODENAL (EGD) BIOPSY. Nausea/Vomiting: None    Postoperative hydration reviewed and adequate. Pain:  Pain Scale 1: Numeric (0 - 10) (03/06/17 1644)  Pain Intensity 1: 0 (03/06/17 1644)   Managed    Neurological Status:   Neuro  Neurologic State: Alert (03/06/17 0427)  Orientation Level: Oriented X4 (03/06/17 0856)  Cognition: Appropriate safety awareness; Follows commands (03/06/17 5368)  Speech: Clear (03/06/17 0856)  Assessment L Pupil: Brisk (03/04/17 2201)  Size L Pupil (mm): 3 (03/04/17 2201)  Assessment R Pupil: Brisk (03/04/17 2201)  Size R Pupil (mm): 3 (03/04/17 2201)  LUE Motor Response: Purposeful (03/06/17 0856)  LLE Motor Response: Purposeful (03/06/17 0856)  RUE Motor Response: Purposeful (03/06/17 0856)  RLE Motor Response: Purposeful (03/06/17 0856)   At baseline    Mental Status and Level of Consciousness: Arousable    Pulmonary Status:   O2 Device: Room air (03/06/17 1716)   Adequate oxygenation and airway patent    Complications related to anesthesia: None    Post-anesthesia assessment completed.  No concerns    Signed By: Jolie Boykin MD     March 6, 2017

## 2017-03-06 NOTE — PROCEDURES
Renaldo Toledo M.D.  [phone]           3/6/2017                EGD Operative Report  Katharina Gamino  :  1965  Hermilo Duron Medical Record Number:  [id number]      Indication:  Melena/hematochezia     : Waldemar Campos MD    Referring Provider:  Britt Han MD      Anesthesia/Sedation:  MAC anesthesia    Airway assessment: No airway problems anticipated    Pre-Procedural Exam:      Airway: clear, no airway problems anticipated  Heart: RRR, without gallops or rubs  Lungs: clear bilaterally without wheezes, crackles, or rhonchi  Abdomen: soft, nontender, nondistended, bowel sounds present  Mental Status: awake, alert and oriented to person, place and time       Procedure Details     After infomed consent was obtained for the procedure, with all risks and benefits of procedure explained the patient was taken to the endoscopy suite and placed in the left lateral decubitus position. Following sequential administration of sedation as per above, the endoscope was inserted into the mouth and advanced under direct vision to second portion of the duodenum. A careful inspection was made as the gastroscope was withdrawn, including a retroflexed view of the proximal stomach; findings and interventions are described below. Findings:   Esophagus:normal mucosa, no varices seen. Stomach: Evidence of 3 small superficial ulcers seen in the pre-pyloric antrum, otherwise mucosa within normal.  Duodenum/jejunum: normal    Therapies:  none    Specimens: Pyloritek           Complications:   None; patient tolerated the procedure well. EBL:  None.            Impression:    Small gastric ulcers, otherwise mucosa within normal.     Recommendations:    -Acid suppression with a proton pump inhibitor.  -Await ASIF test result and treat for Helicobacter pylori if positive.  -Avoid taking any NSAIDs    Bhanu Lopez MD

## 2017-03-07 VITALS
OXYGEN SATURATION: 98 % | BODY MASS INDEX: 25.19 KG/M2 | WEIGHT: 120 LBS | DIASTOLIC BLOOD PRESSURE: 66 MMHG | HEART RATE: 95 BPM | TEMPERATURE: 98.4 F | RESPIRATION RATE: 18 BRPM | SYSTOLIC BLOOD PRESSURE: 99 MMHG | HEIGHT: 58 IN

## 2017-03-07 LAB
ANION GAP BLD CALC-SCNC: 12 MMOL/L (ref 5–15)
ANION GAP BLD CALC-SCNC: 8 MMOL/L (ref 5–15)
BASOPHILS # BLD AUTO: 0 K/UL (ref 0–0.1)
BASOPHILS # BLD: 0 % (ref 0–1)
BUN SERPL-MCNC: 4 MG/DL (ref 6–20)
BUN SERPL-MCNC: 5 MG/DL (ref 6–20)
BUN/CREAT SERPL: 5 (ref 12–20)
BUN/CREAT SERPL: 7 (ref 12–20)
CALCIUM SERPL-MCNC: 7.9 MG/DL (ref 8.5–10.1)
CALCIUM SERPL-MCNC: 8.3 MG/DL (ref 8.5–10.1)
CHLORIDE SERPL-SCNC: 105 MMOL/L (ref 97–108)
CHLORIDE SERPL-SCNC: 110 MMOL/L (ref 97–108)
CO2 SERPL-SCNC: 21 MMOL/L (ref 21–32)
CO2 SERPL-SCNC: 25 MMOL/L (ref 21–32)
CREAT SERPL-MCNC: 0.7 MG/DL (ref 0.55–1.02)
CREAT SERPL-MCNC: 0.75 MG/DL (ref 0.55–1.02)
EOSINOPHIL # BLD: 0.4 K/UL (ref 0–0.4)
EOSINOPHIL NFR BLD: 4 % (ref 0–7)
ERYTHROCYTE [DISTWIDTH] IN BLOOD BY AUTOMATED COUNT: 16.6 % (ref 11.5–14.5)
ERYTHROCYTE [DISTWIDTH] IN BLOOD BY AUTOMATED COUNT: 16.6 % (ref 11.5–14.5)
GLUCOSE SERPL-MCNC: 127 MG/DL (ref 65–100)
GLUCOSE SERPL-MCNC: 96 MG/DL (ref 65–100)
HCT VFR BLD AUTO: 24.9 % (ref 35–47)
HCT VFR BLD AUTO: 25.7 % (ref 35–47)
HGB BLD-MCNC: 7.9 G/DL (ref 11.5–16)
HGB BLD-MCNC: 8.7 G/DL (ref 11.5–16)
LYMPHOCYTES # BLD AUTO: 10 % (ref 12–49)
LYMPHOCYTES # BLD: 0.9 K/UL (ref 0.8–3.5)
MCH RBC QN AUTO: 30.7 PG (ref 26–34)
MCH RBC QN AUTO: 32.7 PG (ref 26–34)
MCHC RBC AUTO-ENTMCNC: 31.7 G/DL (ref 30–36.5)
MCHC RBC AUTO-ENTMCNC: 33.9 G/DL (ref 30–36.5)
MCV RBC AUTO: 96.6 FL (ref 80–99)
MCV RBC AUTO: 96.9 FL (ref 80–99)
MONOCYTES # BLD: 0.5 K/UL (ref 0–1)
MONOCYTES NFR BLD AUTO: 6 % (ref 5–13)
NEUTS SEG # BLD: 7.4 K/UL (ref 1.8–8)
NEUTS SEG NFR BLD AUTO: 80 % (ref 32–75)
PLATELET # BLD AUTO: 133 K/UL (ref 150–400)
PLATELET # BLD AUTO: 149 K/UL (ref 150–400)
POTASSIUM SERPL-SCNC: 3.4 MMOL/L (ref 3.5–5.1)
POTASSIUM SERPL-SCNC: 3.6 MMOL/L (ref 3.5–5.1)
RBC # BLD AUTO: 2.57 M/UL (ref 3.8–5.2)
RBC # BLD AUTO: 2.66 M/UL (ref 3.8–5.2)
SODIUM SERPL-SCNC: 138 MMOL/L (ref 136–145)
SODIUM SERPL-SCNC: 143 MMOL/L (ref 136–145)
WBC # BLD AUTO: 5.2 K/UL (ref 3.6–11)
WBC # BLD AUTO: 9.2 K/UL (ref 3.6–11)

## 2017-03-07 PROCEDURE — 74011250637 HC RX REV CODE- 250/637: Performed by: FAMILY MEDICINE

## 2017-03-07 PROCEDURE — 74011250637 HC RX REV CODE- 250/637: Performed by: INTERNAL MEDICINE

## 2017-03-07 PROCEDURE — 36415 COLL VENOUS BLD VENIPUNCTURE: CPT | Performed by: FAMILY MEDICINE

## 2017-03-07 PROCEDURE — 85027 COMPLETE CBC AUTOMATED: CPT | Performed by: FAMILY MEDICINE

## 2017-03-07 PROCEDURE — 74011250636 HC RX REV CODE- 250/636: Performed by: INTERNAL MEDICINE

## 2017-03-07 PROCEDURE — 85025 COMPLETE CBC W/AUTO DIFF WBC: CPT | Performed by: FAMILY MEDICINE

## 2017-03-07 PROCEDURE — 80048 BASIC METABOLIC PNL TOTAL CA: CPT | Performed by: FAMILY MEDICINE

## 2017-03-07 RX ORDER — OMEPRAZOLE 20 MG/1
20 CAPSULE, DELAYED RELEASE ORAL 2 TIMES DAILY
Qty: 60 CAP | Refills: 3 | Status: SHIPPED | OUTPATIENT
Start: 2017-03-07 | End: 2018-05-08

## 2017-03-07 RX ORDER — POTASSIUM CHLORIDE 750 MG/1
10 TABLET, FILM COATED, EXTENDED RELEASE ORAL 2 TIMES DAILY
Status: DISCONTINUED | OUTPATIENT
Start: 2017-03-07 | End: 2017-03-07 | Stop reason: HOSPADM

## 2017-03-07 RX ADMIN — Medication 10 ML: at 06:07

## 2017-03-07 RX ADMIN — SODIUM CHLORIDE 125 ML/HR: 900 INJECTION, SOLUTION INTRAVENOUS at 02:10

## 2017-03-07 RX ADMIN — POTASSIUM CHLORIDE 10 MEQ: 750 TABLET, FILM COATED, EXTENDED RELEASE ORAL at 12:44

## 2017-03-07 RX ADMIN — PANTOPRAZOLE SODIUM 40 MG: 40 TABLET, DELAYED RELEASE ORAL at 08:40

## 2017-03-07 RX ADMIN — Medication 10 ML: at 14:00

## 2017-03-07 RX ADMIN — DULOXETINE HYDROCHLORIDE 120 MG: 30 CAPSULE, DELAYED RELEASE ORAL at 08:40

## 2017-03-07 RX ADMIN — ACETAMINOPHEN 650 MG: 325 TABLET ORAL at 12:01

## 2017-03-07 NOTE — PROGRESS NOTES
Bedside and Verbal shift change report given to Anita and Malissa Escobar (oncoming nurse) by Compa Kennedy (offgoing nurse). Report included the following information SBAR, Kardex, MAR and Recent Results. Shift Summary  1945: Received report from off going nurse. 1957: During assessment, asked pt if she was in pain. Stated \"IV in left hand was sore\" Asked if she would like it removed and she stated she would wait. Patient was worried about a BM she passed; thought it was more GI bleed, but it was not. 0619: Attempted to flush pt left hand IV, but it was too sore. Removed IV.

## 2017-03-07 NOTE — PROGRESS NOTES
Bedside and Verbal shift change report given to Jamia Uriostegui (oncoming nurse) by Delano Lomas (offgoing nurse). Report included the following information SBAR, Kardex, Procedure Summary and Recent Results. 2653 Dr Georgiana Echavarria on floor, notified that pt takes aspirin for pain. He will address with pt.     0915 order to dc fluids, am labs    0930 Phlebotomy at bedside to draw labs    1039 Dr Georgiana Echavarria notified of results of CBC, BMP, no orders at this time    [phone] Telephone orders taken from Dr Georgiana Echavarria to place orders for CBC, BMP @ 1500, call with results.  Order noted for Kclor tabs 10mEq BID    1558 Dr Georgiana Echavarria notified of BMP, CBC results    1606 order noted for dc, pt informed, will call friend to pick her up

## 2017-03-07 NOTE — PROGRESS NOTES
Bedside and Verbal shift change report given to [facility] (oncoming nurse) by Kehinde Bautista (offgoing nurse). Report included the following information SBAR, Kardex, MAR and Recent Results.

## 2017-03-07 NOTE — DISCHARGE SUMMARY
Physician Discharge Summary     Patient ID:    Teresa Richards  [id number]  46 y.o.  1965  Martha Chanel MD    Admit date: 3/4/2017    Discharge date and time: 3/7/2017    Admission Diagnoses: GI bleed;melena    Chronic Diagnoses:    Problem List as of 3/7/2017  Date Reviewed: 3/5/2017          Codes Class Noted - Resolved    * (Principal)GI bleed ICD-10-CM: K92.2  ICD-9-CM: 578.9  3/4/2017 - Present        Acute blood loss anemia ICD-10-CM: D62  ICD-9-CM: 285.1  3/4/2017 - Present        Hypothyroidism (Chronic) ICD-10-CM: E03.9  ICD-9-CM: 244.9  6/3/2015 - Present        Depression (Chronic) ICD-10-CM: F32.9  ICD-9-CM: 522  6/3/2015 - Present        Microcytic anemia (Chronic) ICD-10-CM: D50.9  ICD-9-CM: 280.9  6/3/2015 - Present        Alcohol abuse (Chronic) ICD-10-CM: F10.10  ICD-9-CM: 305.00  6/3/2015 - Present        RESOLVED: SOB (shortness of breath) ICD-10-CM: R06.02  ICD-9-CM: 786.05  6/3/2015 - 3/4/2017              Discharge Medications:   Current Discharge Medication List      CONTINUE these medications which have CHANGED    Details   omeprazole (PRILOSEC) 20 mg capsule Take 1 Cap by mouth two (2) times a day. Qty: 60 Cap, Refills: 3         CONTINUE these medications which have NOT CHANGED    Details   fish oil-omega-3 fatty acids (FISH OIL) 340-1,000 mg capsule Take 1 Cap by mouth daily as needed (Psoriasis). albuterol (PROVENTIL HFA, VENTOLIN HFA, PROAIR HFA) 90 mcg/actuation inhaler Take 2 Puffs by inhalation every six (6) hours as needed for Wheezing. DULoxetine (CYMBALTA) 60 mg capsule Take 120 mg by mouth daily. ePHEDrine-guaiFENesin (PRIMATENE ASTHMA) 12.5-200 mg tab Take 1 Tab by mouth daily as needed. STOP taking these medications       aspirin (ASPIRIN) 325 mg tablet Comments:   Reason for Stopping: Follow up Care:    1. Martha Chanel MD with in 1 weeks  2.  specialists as directed. Diet:  Regular Diet    Disposition:  Home.     Advanced Directive:    Discharge Exam:  [See today's progress note.]    CONSULTATIONS: GI    Significant Diagnostic Studies:   Recent Labs      03/07/17   1440  03/07/17   0936   WBC  9.2  5.2   HGB  8.7*  7.9*   HCT  25.7*  24.9*   PLT  149*  133*     Recent Labs      03/07/17   1440  03/07/17   0936  03/06/17   1431   03/05/17   0928   NA  138  143  139   < >  142   K  3.6  3.4*  3.7   < >  3.1*   CL  105  110*  107   < >  110*   CO2  25  21  23   < >  24   BUN  5*  4*  10   < >  15   CREA  0.70  0.75  0.54*   < >  0.56   GLU  96  127*  95   < >  93   CA  8.3*  7.9*  8.0*   < >  7.3*   MG   --    --    --    --   1.6   PHOS   --    --    --    --   2.2*    < > = values in this interval not displayed. Recent Labs      03/06/17   0454  03/04/17   2142   SGOT  36  34   ALT  25  35   AP  63  100   TBILI  0.3  0.3   TP  5.3*  6.0*   ALB  2.7*  3.0*   GLOB  2.6  3.0   AML   --   37   LPSE   --   148     Recent Labs      03/04/17 2142   INR  1.2*   PTP  12.1*   APTT  23.6      No results for input(s): FE, TIBC, PSAT, FERR in the last 72 hours. No results for input(s): PH, PCO2, PO2 in the last 72 hours. Recent Labs      03/04/17   2142   CPK  48   CKMB  <1.0     Lab Results   Component Value Date/Time    Glucose (POC) 194 06/03/2015 11:26 AM    Glucose (POC) 99 07/09/2014 04:12 PM     Lab Results   Component Value Date/Time    TSH 3.18 03/05/2017 09:28 AM             HOSPITAL COURSE & TREATMENT RENDERED:   1.  See today's progress note:    Daily Progress Note and Discharge Note: 3/7/2017  Alexandro Luong MD    Assessment/Plan:   GI bleed (3/4/2017)  - EGD 3/6/17 revealed small gastric ulces  - PPI outpt     Hypothyroidism (6/3/2015)  - not on LT4  -  TSH ok at 3.18 (3/5/17)     Depression (6/3/2015)  - continue Cymbalta     Alcohol abuse (6/3/2015)  - counseled on cessation  - reports she does not drink every day, should be low risk for DTs     Acute blood loss anemia (3/4/2017)  - POA, due to acute GI blood loss over last few days  - received 2 units PRCs in ED  - follow Hgb closely, transfuse as need to maintain Hgb >8    Code status:  - patient is FULL CODE              Subjective:    46 y.o.  female who is admitted with GI bleed. Ms. Tamez Comes presented to the Emergency Department this PM complaining of GI bleed: started 3 days ago, intermittent, dark blood per rectum, associated with SOB  History obtained from chart review and the patient. Previous records reviewed ( Dr Maria Leung)    3/6:  No complaints this AM.  No further bleeding. K+ low. Hb 8.7. Scopes per GI today. 3/7:  No further bleeding. 3 small gastric ulcers seen on EGD. No complaints otherwise. K+ back up. Will check Hb at 3PM and if stable then [id number] [address] for further work up and tx outpt. No NSAIDS. PPI daily. Follow up with PCP or other in our office in 2-3 days after DC.  4PM:  Repeat CBC ok with Hb 8.7. Stable for DC. Review of Systems:   A comprehensive review of systems was negative except for that written in the HPI. Objective:   Physical Exam:     Visit Vitals    BP 99/66 (BP 1 Location: Left arm, BP Patient Position: At rest)    Pulse 72    Temp 98.4 °F (36.9 °C)    Resp 18    Ht 4' 10\" (1.473 m)    Wt 54.4 kg (120 lb)    SpO2 98%    Breastfeeding No    BMI 25.08 kg/m2    O2 Flow Rate (L/min): 2 l/min O2 Device: Room air    Temp (24hrs), Av.1 °F (36.7 °C), Min:97.9 °F (36.6 °C), Max:98.4 °F (36.9 °C)    701 - 1900  In: 120 [P.O.:120]  Out: 4680 [Urine:1175]   1901 -  0700  In: 2260 [P.O.:960; I.V.:1300]  Out: 6258 [Urine:6258]    General:  Alert, cooperative, no distress, appears stated age. Head:  Normocephalic, without obvious abnormality, atraumatic. Eyes:  Conjunctivae/corneas clear. PERRL, EOMs intact.    Throat: Lips, mucosa, and tongue moist..   Neck: Supple, symmetrical, trachea midline, no adenopathy, thyroid: no enlargement/tenderness/nodules, no carotid bruit and no JVD. Back:   Symmetric, no curvature. ROM normal. No CVA tenderness. Lungs:   Clear to auscultation bilaterally. Chest wall:  No tenderness or deformity. Heart:  Regular rate and rhythm, S1, S2 normal, no murmur, click, rub or gallop. Abdomen:   Soft, non-tender. Bowel sounds normal. No masses,  No organomegaly. Extremities: no cyanosis or edema. No calf tenderness or cords. Skin:  turgor normal.   Neurologic: CNII-XII intact. Alert and oriented X 3. Fine motor of hands and fingers normal.   equal.  No cogwheeling or rigidity. Gait not tested at this time. Sensation grossly normal to touch.   Gross motor of extremities normal.       Signed:  Shaq Leiva MD  3/7/2017  4:03 PM

## 2017-03-07 NOTE — PROGRESS NOTES
Daily Progress Note and Discharge Note: 3/7/2017  Abundio Verdugo MD    Assessment/Plan:   GI bleed (3/4/2017)  - EGD 3/6/17 revealed small gastric ulces  - PPI outpt     Hypothyroidism (6/3/2015)  - not on LT4  -  TSH ok at 3.18 (3/5/17)     Depression (6/3/2015)  - continue Cymbalta     Alcohol abuse (6/3/2015)  - counseled on cessation  - reports she does not drink every day, should be low risk for DTs     Acute blood loss anemia (3/4/2017)  - POA, due to acute GI blood loss over last few days  - received 2 units PRCs in ED  - follow Hgb closely, transfuse as need to maintain Hgb >8    Code status:  - patient is FULL CODE           Problem List:  Problem List as of 3/7/2017  Date Reviewed: 3/5/2017          Codes Class Noted - Resolved    * (Principal)GI bleed ICD-10-CM: K92.2  ICD-9-CM: 578.9  3/4/2017 - Present        Acute blood loss anemia ICD-10-CM: D62  ICD-9-CM: 285.1  3/4/2017 - Present        Hypothyroidism (Chronic) ICD-10-CM: E03.9  ICD-9-CM: 244.9  6/3/2015 - Present        Depression (Chronic) ICD-10-CM: F32.9  ICD-9-CM: 311  6/3/2015 - Present        Microcytic anemia (Chronic) ICD-10-CM: D50.9  ICD-9-CM: 280.9  6/3/2015 - Present        Alcohol abuse (Chronic) ICD-10-CM: F10.10  ICD-9-CM: 305.00  6/3/2015 - Present        RESOLVED: SOB (shortness of breath) ICD-10-CM: R06.02  ICD-9-CM: 786.05  6/3/2015 - 3/4/2017              Subjective:    46 y.o.  female who is admitted with GI bleed. Ms. Piedad Garcia presented to the Emergency Department this PM complaining of GI bleed: started 3 days ago, intermittent, dark blood per rectum, associated with SOB  History obtained from chart review and the patient. Previous records reviewed ( Dr Nina Gonzalez)    3/6:  No complaints this AM.  No further bleeding. K+ low. Hb 8.7. Scopes per GI today. 3/7:  No further bleeding. 3 small gastric ulcers seen on EGD. No complaints otherwise. K+ back up.   Will check Hb at 3PM and if stable then [address] [address]  for further work up and tx outpt. No NSAIDS. PPI daily. Follow up with PCP or other in our office in 2-3 days after DC.  4PM:  Repeat CBC ok with Hb 8.7. Stable for DC. Review of Systems:   A comprehensive review of systems was negative except for that written in the HPI. Objective:   Physical Exam:     Visit Vitals    BP 99/66 (BP 1 Location: Left arm, BP Patient Position: At rest)    Pulse 72    Temp 98.4 °F (36.9 °C)    Resp 18    Ht 4' 10\" (1.473 m)    Wt 54.4 kg (120 lb)    SpO2 98%    Breastfeeding No    BMI 25.08 kg/m2    O2 Flow Rate (L/min): 2 l/min O2 Device: Room air    Temp (24hrs), Av.1 °F (36.7 °C), Min:97.9 °F (36.6 °C), Max:98.4 °F (36.9 °C)    701 -  1900  In: 120 [P.O.:120]  Out: 8538 [Urine:1175]    190 -  0700  In: 2260 [P.O.:960; I.V.:1300]  Out: 6258 [Urine:6258]    General:  Alert, cooperative, no distress, appears stated age. Head:  Normocephalic, without obvious abnormality, atraumatic. Eyes:  Conjunctivae/corneas clear. PERRL, EOMs intact. Throat: Lips, mucosa, and tongue moist..   Neck: Supple, symmetrical, trachea midline, no adenopathy, thyroid: no enlargement/tenderness/nodules, no carotid bruit and no JVD. Back:   Symmetric, no curvature. ROM normal. No CVA tenderness. Lungs:   Clear to auscultation bilaterally. Chest wall:  No tenderness or deformity. Heart:  Regular rate and rhythm, S1, S2 normal, no murmur, click, rub or gallop. Abdomen:   Soft, non-tender. Bowel sounds normal. No masses,  No organomegaly. Extremities: no cyanosis or edema. No calf tenderness or cords. Skin:  turgor normal.   Neurologic: CNII-XII intact. Alert and oriented X 3. Fine motor of hands and fingers normal.   equal.  No cogwheeling or rigidity. Gait not tested at this time. Sensation grossly normal to touch.   Gross motor of extremities normal.       Data Review:       Recent Days:  Recent Labs      17   6394 03/07/17   0936  03/06/17   0454   WBC  9.2  5.2  4.0   HGB  8.7*  7.9*  8.7*   HCT  25.7*  24.9*  25.7*   PLT  149*  133*  143*     Recent Labs      03/07/17   1440  03/07/17   0936  03/06/17   1431  03/06/17   0454  03/05/17   0928  03/04/17   2142   NA  138  143  139  144  142  137   K  3.6  3.4*  3.7  2.9*  3.1*  3.6   CL  105  110*  107  112*  110*  101   CO2  25  21  23  21  24  22   GLU  96  127*  95  94  93  225*   BUN  5*  4*  10  4*  15  36*   CREA  0.70  0.75  0.54*  0.49*  0.56  0.79   CA  8.3*  7.9*  8.0*  7.4*  7.3*  8.3*   MG   --    --    --    --   1.6   --    PHOS   --    --    --    --   2.2*   --    ALB   --    --    --   2.7*   --   3.0*   TBILI   --    --    --   0.3   --   0.3   SGOT   --    --    --   36   --   34   ALT   --    --    --   25   --   35   INR   --    --    --    --    --   1.2*     No results for input(s): PH, PCO2, PO2, HCO3, FIO2 in the last 72 hours.     24 Hour Results:  Recent Results (from the past 24 hour(s))   POC H. PYLORI, TISSUE    Collection Time: 03/06/17  5:12 PM   Result Value Ref Range    H. pylori from tissue Negative Negative    Positive control Positive     Negative control Negative     Lot no. [id number]    CBC W/O DIFF    Collection Time: 03/07/17  9:36 AM   Result Value Ref Range    WBC 5.2 3.6 - 11.0 K/uL    RBC 2.57 (L) 3.80 - 5.20 M/uL    HGB 7.9 (L) 11.5 - 16.0 g/dL    HCT 24.9 (L) 35.0 - 47.0 %    MCV 96.9 80.0 - 99.0 FL    MCH 30.7 26.0 - 34.0 PG    MCHC 31.7 30.0 - 36.5 g/dL    RDW 16.6 (H) 11.5 - 14.5 %    PLATELET 613 (L) 030 - 781 K/uL   METABOLIC PANEL, BASIC    Collection Time: 03/07/17  9:36 AM   Result Value Ref Range    Sodium 143 136 - 145 mmol/L    Potassium 3.4 (L) 3.5 - 5.1 mmol/L    Chloride 110 (H) 97 - 108 mmol/L    CO2 21 21 - 32 mmol/L    Anion gap 12 5 - 15 mmol/L    Glucose 127 (H) 65 - 100 mg/dL    BUN 4 (L) 6 - 20 MG/DL    Creatinine 0.75 0.55 - 1.02 MG/DL    BUN/Creatinine ratio 5 (L) 12 - 20      GFR est AA >60 >60 ml/min/1.73m2 GFR est non-AA >60 >60 ml/min/1.73m2    Calcium 7.9 (L) 8.5 - 80.9 MG/DL   METABOLIC PANEL, BASIC    Collection Time: 03/07/17  2:40 PM   Result Value Ref Range    Sodium 138 136 - 145 mmol/L    Potassium 3.6 3.5 - 5.1 mmol/L    Chloride 105 97 - 108 mmol/L    CO2 25 21 - 32 mmol/L    Anion gap 8 5 - 15 mmol/L    Glucose 96 65 - 100 mg/dL    BUN 5 (L) 6 - 20 MG/DL    Creatinine 0.70 0.55 - 1.02 MG/DL    BUN/Creatinine ratio 7 (L) 12 - 20      GFR est AA >60 >60 ml/min/1.73m2    GFR est non-AA >60 >60 ml/min/1.73m2    Calcium 8.3 (L) 8.5 - 10.1 MG/DL   CBC WITH AUTOMATED DIFF    Collection Time: 03/07/17  2:40 PM   Result Value Ref Range    WBC 9.2 3.6 - 11.0 K/uL    RBC 2.66 (L) 3.80 - 5.20 M/uL    HGB 8.7 (L) 11.5 - 16.0 g/dL    HCT 25.7 (L) 35.0 - 47.0 %    MCV 96.6 80.0 - 99.0 FL    MCH 32.7 26.0 - 34.0 PG    MCHC 33.9 30.0 - 36.5 g/dL    RDW 16.6 (H) 11.5 - 14.5 %    PLATELET 840 (L) 781 - 400 K/uL    NEUTROPHILS 80 (H) 32 - 75 %    LYMPHOCYTES 10 (L) 12 - 49 %    MONOCYTES 6 5 - 13 %    EOSINOPHILS 4 0 - 7 %    BASOPHILS 0 0 - 1 %    ABS. NEUTROPHILS 7.4 1.8 - 8.0 K/UL    ABS. LYMPHOCYTES 0.9 0.8 - 3.5 K/UL    ABS. MONOCYTES 0.5 0.0 - 1.0 K/UL    ABS. EOSINOPHILS 0.4 0.0 - 0.4 K/UL    ABS. BASOPHILS 0.0 0.0 - 0.1 K/UL       Medications reviewed  Current Facility-Administered Medications   Medication Dose Route Frequency    potassium chloride SR (KLOR-CON 10) tablet 10 mEq  10 mEq Oral BID    pantoprazole (PROTONIX) tablet 40 mg  40 mg Oral ACB    sodium chloride (NS) flush 5-10 mL  5-10 mL IntraVENous Q8H    sodium chloride (NS) flush 5-10 mL  5-10 mL IntraVENous PRN    acetaminophen (TYLENOL) tablet 650 mg  650 mg Oral Q4H PRN    DULoxetine (CYMBALTA) capsule 120 mg  120 mg Oral DAILY    0.9% sodium chloride infusion 250 mL  250 mL IntraVENous PRN       Care Plan discussed with: Patient and Nurse    Total time spent with patient: 30 minutes.     Gunjan Strong MD

## 2017-03-07 NOTE — PROGRESS NOTES
Discharge instructions were reviewed with patient. All questions were answered. IV and heart monitor were removed. Patient received a copy of her discharge instructions. Her [facility] was [facility]. Patient will be discharged home with her friend.

## 2017-03-07 NOTE — DISCHARGE INSTRUCTIONS
Patient Discharge Instructions    Reno Mosqueda / [id number] : 1965    Admitted 3/4/2017 Discharged: 3/7/2017 4:02 PM     ACUTE DIAGNOSES:  GI bleed  melena    CHRONIC MEDICAL DIAGNOSES:  Problem List as of 3/7/2017  Date Reviewed: 3/5/2017          Codes Class Noted - Resolved    * (Principal)GI bleed ICD-10-CM: K92.2  ICD-9-CM: 578.9  3/4/2017 - Present        Acute blood loss anemia ICD-10-CM: D62  ICD-9-CM: 285.1  3/4/2017 - Present        Hypothyroidism (Chronic) ICD-10-CM: E03.9  ICD-9-CM: 244.9  6/3/2015 - Present        Depression (Chronic) ICD-10-CM: F32.9  ICD-9-CM: 112  6/3/2015 - Present        Microcytic anemia (Chronic) ICD-10-CM: D50.9  ICD-9-CM: 280.9  6/3/2015 - Present        Alcohol abuse (Chronic) ICD-10-CM: F10.10  ICD-9-CM: 305.00  6/3/2015 - Present        RESOLVED: SOB (shortness of breath) ICD-10-CM: R06.02  ICD-9-CM: 786.05  6/3/2015 - 3/4/2017              DISCHARGE MEDICATIONS:   No Asprin for now. No Motrin or Ibuprofen for now. No aleve for now. Use tylenol,      · It is important that you take the medication exactly as they are prescribed. · Keep your medication in the bottles provided by the pharmacist and keep a list of the medication names, dosages, and times to be taken in your wallet. · Do not take other medications without consulting your doctor. DIET:  Regular Diet    ACTIVITY: Activity as tolerated    ADDITIONAL INFORMATION: If you experience any of the following symptoms then please call your primary care physician or return to the emergency room if you cannot get hold of your doctor: Fever, chills, nausea, vomiting, diarrhea, change in mentation, falling, bleeding, shortness of breath. FOLLOW UP CARE:  Dr. Jocelyn Fox MD  you are to call and set up an appointment to see them with in 1 week.     Follow-up with specialists at directed by them      Information obtained by :  I understand that if any problems occur once I am at home I am to contact my physician. I understand and acknowledge receipt of the instructions indicated above. Physician's or R.N.'s Signature                                                                  Date/Time                                                                                                                                              Patient or Representative Signature                                                          Date/Time         Peptic Ulcer Disease: Care Instructions  Your Care Instructions    Peptic ulcers are sores on the inside of the stomach or the small intestine. They are usually caused by an infection with bacteria or from use of nonsteroidal anti-inflammatory drugs (NSAIDs). NSAIDs include aspirin, ibuprofen (Advil), and naproxen (Aleve). Your doctor may have prescribed medicine to reduce stomach acid. You also may need to take antibiotics if your peptic ulcers are caused by an infection. You can help your stomach heal and keep ulcers from coming back by making some changes in your lifestyle. Quit smoking, limit caffeine and alcohol, and reduce stress. Follow-up care is a key part of your treatment and safety. Be sure to make and go to all appointments, and call your doctor if you are having problems. Its also a good idea to know your test results and keep a list of the medicines you take. How can you care for yourself at home? · Take your medicines exactly as prescribed. Call your doctor if you think you are having a problem with your medicine. · Do not take aspirin or other NSAIDs such as ibuprofen (Advil or Motrin) or naproxen (Aleve). Ask your doctor what you can take for pain. · Do not smoke. Smoking can make ulcers worse. If you need help quitting, talk to your doctor about stop-smoking programs and medicines.  These can increase your chances of quitting for good. · Drink in moderation or avoid drinking alcohol. · Do not drink beverages that have caffeine if they bother your stomach. These include coffee, tea, and soda. · Eat a balanced diet of small, frequent meals. Make an appointment with a dietitian if you need help planning your meals. · Reduce stress. Avoid people and places that make you feel anxious, if you can. Learn ways to reduce stress, such as biofeedback, guided imagery, and meditation. When should you call for help? Call 911 anytime you think you may need emergency care. For example, call if:  · You passed out (lost consciousness). · You vomit blood or what looks like coffee grounds. · You pass maroon or very bloody stools. Call your doctor now or seek immediate medical care if:  · You have severe pain in your belly, back, or shoulders. · You have new or worsening belly pain. · You are dizzy or lightheaded, or you feel like you may faint. · Your stools are black and tarlike or have streaks of blood. Watch closely for changes in your health, and be sure to contact your doctor if:  · You have new symptoms such as weight loss, nausea or vomiting. · You do not feel better as expected. Where can you learn more? Go to http://sindy-abrahan.info/. Enter S910 in the search box to learn more about \"Peptic Ulcer Disease: Care Instructions. \"  Current as of: August 9, 2016  Content Version: 11.1  © 4701-5999 [employer]. Care instructions adapted under license by [employer] (which disclaims liability or warranty for this information). If you have questions about a medical condition or this instruction, always ask your healthcare professional. [place] [address] any warranty or liability for your use of this information.

## 2017-03-07 NOTE — PROGRESS NOTES
[facility]. [address]  [phone]              GI PROGRESS NOTE    NAME: Ivanna Signs   :  1965   MRN:  [id number]     CC:f/u gastric ulcers    Subjective:   Denies complaints. Tolerating po. Objective:     VITALS:   Last 24hrs VS reviewed since prior progress note. Most recent are:  Visit Vitals    BP 99/66 (BP 1 Location: Left arm, BP Patient Position: At rest)    Pulse 72    Temp 98.4 °F (36.9 °C)    Resp 18    Ht 4' 10\" (1.473 m)    Wt 54.4 kg (120 lb)    SpO2 98%    Breastfeeding No    BMI 25.08 kg/m2       Intake & Output   07 -  190  In: 120 [P.O.:120]  Out: 225 [Urine:225]  1901 -  0700  In: 2260 [P.O.:960; I.V.:1300]  Out: 6258 [Urine:6258]    ROS: Neg for fever, chest pain, SOB. PHYSICAL EXAM:  General: Cooperative, no acute distress    Neurologic:  Alert and oriented X 3. HEENT: PERRLA, EOMI. Lungs:   No Wheezing  Heart:  Pink, warm, dry   Abdomen: Soft, Non distended, Non tender. Bowel sounds normal. No guarding or rebound. Extremities: No edema  Psych:   Good insight. Not anxious nor agitated.     Lab Data Reviewed:   Recent Labs      17   0936  17   1431  17   0454  17   0928  17   2142   WBC  5.2   --   4.0  4.5  12.0*   HGB  7.9*   --   8.7*  8.9*  7.5*   MCV  96.9   --   92.8  92.3  100.4*   PLT  133*   --   143*  123*  250   NA  143  139  144  142  137   K  3.4*  3.7  2.9*  3.1*  3.6   CREA  0.75  0.54*  0.49*  0.56  0.79   BUN  4*  10  4*  15  36*   ALB   --    --   2.7*   --   3.0*   TBILI   --    --   0.3   --   0.3   SGOT   --    --   36   --   34   ALT   --    --   25   --   35   AP   --    --   63   --   100   AML   --    --    --    --   37   LPSE   --    --    --    --   148   INR   --    --    --    --   1.2*         ________________________________________________________________________       Assessment/Plan:     Mona/acute blood loss anemia with finding of gastric ulcers on endoscopy 3/6. S/p 2 units in ED. Continue PPI therapy. Avoid NSAIDS. Monitor Hgb and s/sx of further bleeding. OK to discharge from our standpoint if Hgb remains stable.      Signed By: Clint Patten NP     3/7/2017  12:14 PM

## 2017-03-08 LAB
ABO + RH BLD: NORMAL
BLD PROD TYP BPU: NORMAL
BLOOD GROUP ANTIBODIES SERPL: NORMAL
BPU ID: NORMAL
CROSSMATCH RESULT,%XM: NORMAL
SPECIMEN EXP DATE BLD: NORMAL
STATUS OF UNIT,%ST: NORMAL
UNIT DIVISION, %UDIV: 0

## 2018-05-08 ENCOUNTER — HOSPITAL ENCOUNTER (INPATIENT)
Age: 53
LOS: 3 days | Discharge: HOME | DRG: 378 | End: 2018-05-11
Attending: EMERGENCY MEDICINE | Admitting: INTERNAL MEDICINE
Payer: MEDICARE

## 2018-05-08 ENCOUNTER — APPOINTMENT (OUTPATIENT)
Dept: CT IMAGING | Age: 53
DRG: 378 | End: 2018-05-08
Attending: EMERGENCY MEDICINE
Payer: MEDICARE

## 2018-05-08 DIAGNOSIS — D64.9 ANEMIA, UNSPECIFIED TYPE: ICD-10-CM

## 2018-05-08 DIAGNOSIS — K92.2 GASTROINTESTINAL HEMORRHAGE, UNSPECIFIED GASTROINTESTINAL HEMORRHAGE TYPE: Primary | ICD-10-CM

## 2018-05-08 DIAGNOSIS — G43.019 INTRACTABLE MIGRAINE WITHOUT AURA AND WITHOUT STATUS MIGRAINOSUS: ICD-10-CM

## 2018-05-08 PROBLEM — K25.9 GASTRIC ULCER: Status: ACTIVE | Noted: 2018-05-08

## 2018-05-08 PROBLEM — R65.10 SIRS (SYSTEMIC INFLAMMATORY RESPONSE SYNDROME) (HCC): Status: ACTIVE | Noted: 2018-05-08

## 2018-05-08 LAB
ALBUMIN SERPL-MCNC: 3.4 G/DL (ref 3.5–5)
ALBUMIN/GLOB SERPL: 1 {RATIO} (ref 1.1–2.2)
ALP SERPL-CCNC: 93 U/L (ref 45–117)
ALT SERPL-CCNC: 31 U/L (ref 12–78)
ANION GAP SERPL CALC-SCNC: 14 MMOL/L (ref 5–15)
AST SERPL-CCNC: 31 U/L (ref 15–37)
ATRIAL RATE: 127 BPM
BASOPHILS # BLD: 0 K/UL (ref 0–0.1)
BASOPHILS NFR BLD: 0 % (ref 0–1)
BILIRUB SERPL-MCNC: 0.3 MG/DL (ref 0.2–1)
BUN SERPL-MCNC: 23 MG/DL (ref 6–20)
BUN/CREAT SERPL: 30 (ref 12–20)
CALCIUM SERPL-MCNC: 8.6 MG/DL (ref 8.5–10.1)
CALCULATED P AXIS, ECG09: 74 DEGREES
CALCULATED R AXIS, ECG10: 15 DEGREES
CALCULATED T AXIS, ECG11: 33 DEGREES
CHLORIDE SERPL-SCNC: 102 MMOL/L (ref 97–108)
CO2 SERPL-SCNC: 21 MMOL/L (ref 21–32)
CREAT SERPL-MCNC: 0.77 MG/DL (ref 0.55–1.02)
DIAGNOSIS, 93000: NORMAL
DIFFERENTIAL METHOD BLD: ABNORMAL
EOSINOPHIL # BLD: 0.1 K/UL (ref 0–0.4)
EOSINOPHIL NFR BLD: 1 % (ref 0–7)
ERYTHROCYTE [DISTWIDTH] IN BLOOD BY AUTOMATED COUNT: 12.1 % (ref 11.5–14.5)
GLOBULIN SER CALC-MCNC: 3.4 G/DL (ref 2–4)
GLUCOSE SERPL-MCNC: 131 MG/DL (ref 65–100)
HCT VFR BLD AUTO: 20.1 % (ref 35–47)
HEMOCCULT STL QL: POSITIVE
HGB BLD-MCNC: 6.7 G/DL (ref 11.5–16)
IMM GRANULOCYTES # BLD: 0 K/UL (ref 0–0.04)
IMM GRANULOCYTES NFR BLD AUTO: 0 % (ref 0–0.5)
LIPASE SERPL-CCNC: 122 U/L (ref 73–393)
LYMPHOCYTES # BLD: 2.7 K/UL (ref 0.8–3.5)
LYMPHOCYTES NFR BLD: 22 % (ref 12–49)
MCH RBC QN AUTO: 33.2 PG (ref 26–34)
MCHC RBC AUTO-ENTMCNC: 33.3 G/DL (ref 30–36.5)
MCV RBC AUTO: 99.5 FL (ref 80–99)
MONOCYTES # BLD: 0.8 K/UL (ref 0–1)
MONOCYTES NFR BLD: 7 % (ref 5–13)
NEUTS SEG # BLD: 8.5 K/UL (ref 1.8–8)
NEUTS SEG NFR BLD: 70 % (ref 32–75)
NRBC # BLD: 0 K/UL (ref 0–0.01)
NRBC BLD-RTO: 0 PER 100 WBC
P-R INTERVAL, ECG05: 138 MS
PLATELET # BLD AUTO: 251 K/UL (ref 150–400)
PMV BLD AUTO: 10.1 FL (ref 8.9–12.9)
POTASSIUM SERPL-SCNC: 3.8 MMOL/L (ref 3.5–5.1)
PROT SERPL-MCNC: 6.8 G/DL (ref 6.4–8.2)
Q-T INTERVAL, ECG07: 310 MS
QRS DURATION, ECG06: 66 MS
QTC CALCULATION (BEZET), ECG08: 450 MS
RBC # BLD AUTO: 2.02 M/UL (ref 3.8–5.2)
RBC MORPH BLD: ABNORMAL
SODIUM SERPL-SCNC: 137 MMOL/L (ref 136–145)
VENTRICULAR RATE, ECG03: 127 BPM
WBC # BLD AUTO: 12.1 K/UL (ref 3.6–11)

## 2018-05-08 PROCEDURE — P9016 RBC LEUKOCYTES REDUCED: HCPCS | Performed by: EMERGENCY MEDICINE

## 2018-05-08 PROCEDURE — 65660000000 HC RM CCU STEPDOWN

## 2018-05-08 PROCEDURE — 74178 CT ABD&PLV WO CNTR FLWD CNTR: CPT

## 2018-05-08 PROCEDURE — 83690 ASSAY OF LIPASE: CPT | Performed by: EMERGENCY MEDICINE

## 2018-05-08 PROCEDURE — 74011250636 HC RX REV CODE- 250/636: Performed by: EMERGENCY MEDICINE

## 2018-05-08 PROCEDURE — 94761 N-INVAS EAR/PLS OXIMETRY MLT: CPT

## 2018-05-08 PROCEDURE — 80053 COMPREHEN METABOLIC PANEL: CPT | Performed by: EMERGENCY MEDICINE

## 2018-05-08 PROCEDURE — 96361 HYDRATE IV INFUSION ADD-ON: CPT

## 2018-05-08 PROCEDURE — 85025 COMPLETE CBC W/AUTO DIFF WBC: CPT | Performed by: EMERGENCY MEDICINE

## 2018-05-08 PROCEDURE — 36415 COLL VENOUS BLD VENIPUNCTURE: CPT | Performed by: EMERGENCY MEDICINE

## 2018-05-08 PROCEDURE — 36430 TRANSFUSION BLD/BLD COMPNT: CPT

## 2018-05-08 PROCEDURE — 74011250637 HC RX REV CODE- 250/637: Performed by: INTERNAL MEDICINE

## 2018-05-08 PROCEDURE — 96360 HYDRATION IV INFUSION INIT: CPT

## 2018-05-08 PROCEDURE — 99284 EMERGENCY DEPT VISIT MOD MDM: CPT

## 2018-05-08 PROCEDURE — 86923 COMPATIBILITY TEST ELECTRIC: CPT | Performed by: EMERGENCY MEDICINE

## 2018-05-08 PROCEDURE — C9113 INJ PANTOPRAZOLE SODIUM, VIA: HCPCS | Performed by: EMERGENCY MEDICINE

## 2018-05-08 PROCEDURE — 93005 ELECTROCARDIOGRAM TRACING: CPT

## 2018-05-08 PROCEDURE — 74011636320 HC RX REV CODE- 636/320: Performed by: RADIOLOGY

## 2018-05-08 PROCEDURE — 86900 BLOOD TYPING SEROLOGIC ABO: CPT | Performed by: EMERGENCY MEDICINE

## 2018-05-08 PROCEDURE — 82272 OCCULT BLD FECES 1-3 TESTS: CPT | Performed by: EMERGENCY MEDICINE

## 2018-05-08 RX ORDER — LANOLIN ALCOHOL/MO/W.PET/CERES
400 CREAM (GRAM) TOPICAL DAILY
COMMUNITY

## 2018-05-08 RX ORDER — PANTOPRAZOLE SODIUM 40 MG/10ML
40 INJECTION, POWDER, LYOPHILIZED, FOR SOLUTION INTRAVENOUS
Status: COMPLETED | OUTPATIENT
Start: 2018-05-08 | End: 2018-05-08

## 2018-05-08 RX ORDER — FOLIC ACID 1 MG/1
1 TABLET ORAL DAILY
Status: DISCONTINUED | OUTPATIENT
Start: 2018-05-08 | End: 2018-05-11 | Stop reason: HOSPADM

## 2018-05-08 RX ORDER — ASPIRIN 325 MG/1
100 TABLET, FILM COATED ORAL DAILY
Status: DISCONTINUED | OUTPATIENT
Start: 2018-05-08 | End: 2018-05-11 | Stop reason: HOSPADM

## 2018-05-08 RX ORDER — ACETAMINOPHEN 325 MG/1
650 TABLET ORAL
Status: DISCONTINUED | OUTPATIENT
Start: 2018-05-08 | End: 2018-05-11 | Stop reason: HOSPADM

## 2018-05-08 RX ORDER — MORPHINE SULFATE 4 MG/ML
2 INJECTION, SOLUTION INTRAMUSCULAR; INTRAVENOUS
Status: DISCONTINUED | OUTPATIENT
Start: 2018-05-08 | End: 2018-05-11 | Stop reason: HOSPADM

## 2018-05-08 RX ORDER — LORAZEPAM 2 MG/ML
2 INJECTION INTRAMUSCULAR
Status: DISCONTINUED | OUTPATIENT
Start: 2018-05-08 | End: 2018-05-11 | Stop reason: HOSPADM

## 2018-05-08 RX ORDER — PROCHLORPERAZINE EDISYLATE 5 MG/ML
5 INJECTION INTRAMUSCULAR; INTRAVENOUS
Status: DISCONTINUED | OUTPATIENT
Start: 2018-05-08 | End: 2018-05-11 | Stop reason: HOSPADM

## 2018-05-08 RX ORDER — BUTALBITAL, ACETAMINOPHEN AND CAFFEINE 50; 325; 40 MG/1; MG/1; MG/1
1 TABLET ORAL ONCE
Status: COMPLETED | OUTPATIENT
Start: 2018-05-08 | End: 2018-05-08

## 2018-05-08 RX ORDER — DIPHENHYDRAMINE HYDROCHLORIDE 50 MG/ML
12.5 INJECTION, SOLUTION INTRAMUSCULAR; INTRAVENOUS
Status: DISCONTINUED | OUTPATIENT
Start: 2018-05-08 | End: 2018-05-11 | Stop reason: HOSPADM

## 2018-05-08 RX ORDER — SODIUM CHLORIDE 0.9 % (FLUSH) 0.9 %
5-10 SYRINGE (ML) INJECTION EVERY 8 HOURS
Status: DISCONTINUED | OUTPATIENT
Start: 2018-05-08 | End: 2018-05-11 | Stop reason: HOSPADM

## 2018-05-08 RX ORDER — SODIUM CHLORIDE 0.9 % (FLUSH) 0.9 %
5-10 SYRINGE (ML) INJECTION AS NEEDED
Status: DISCONTINUED | OUTPATIENT
Start: 2018-05-08 | End: 2018-05-11 | Stop reason: HOSPADM

## 2018-05-08 RX ORDER — MULTIVITAMIN WITH IRON
1 TABLET ORAL DAILY
Status: DISCONTINUED | OUTPATIENT
Start: 2018-05-09 | End: 2018-05-11 | Stop reason: HOSPADM

## 2018-05-08 RX ORDER — LANOLIN ALCOHOL/MO/W.PET/CERES
1000 CREAM (GRAM) TOPICAL DAILY
COMMUNITY

## 2018-05-08 RX ORDER — LANOLIN ALCOHOL/MO/W.PET/CERES
325 CREAM (GRAM) TOPICAL
COMMUNITY

## 2018-05-08 RX ORDER — LORAZEPAM 2 MG/ML
4 INJECTION INTRAMUSCULAR
Status: DISCONTINUED | OUTPATIENT
Start: 2018-05-08 | End: 2018-05-11 | Stop reason: HOSPADM

## 2018-05-08 RX ORDER — LORAZEPAM 2 MG/ML
2 INJECTION INTRAMUSCULAR
Status: DISCONTINUED | OUTPATIENT
Start: 2018-05-08 | End: 2018-05-08

## 2018-05-08 RX ORDER — NALOXONE HYDROCHLORIDE 0.4 MG/ML
0.4 INJECTION, SOLUTION INTRAMUSCULAR; INTRAVENOUS; SUBCUTANEOUS AS NEEDED
Status: DISCONTINUED | OUTPATIENT
Start: 2018-05-08 | End: 2018-05-11 | Stop reason: HOSPADM

## 2018-05-08 RX ORDER — SODIUM CHLORIDE 9 MG/ML
250 INJECTION, SOLUTION INTRAVENOUS AS NEEDED
Status: DISCONTINUED | OUTPATIENT
Start: 2018-05-08 | End: 2018-05-11 | Stop reason: HOSPADM

## 2018-05-08 RX ADMIN — Medication 100 MG: at 17:23

## 2018-05-08 RX ADMIN — SODIUM CHLORIDE 1000 ML: 900 INJECTION, SOLUTION INTRAVENOUS at 13:03

## 2018-05-08 RX ADMIN — BUTALBITAL, ACETAMINOPHEN, AND CAFFEINE 1 TABLET: 50; 325; 40 TABLET ORAL at 17:23

## 2018-05-08 RX ADMIN — IOPAMIDOL 98 ML: 755 INJECTION, SOLUTION INTRAVENOUS at 14:48

## 2018-05-08 RX ADMIN — Medication 10 ML: at 17:24

## 2018-05-08 RX ADMIN — Medication 10 ML: at 21:57

## 2018-05-08 RX ADMIN — FOLIC ACID 1 MG: 1 TABLET ORAL at 17:23

## 2018-05-08 RX ADMIN — PANTOPRAZOLE SODIUM 40 MG: 40 INJECTION, POWDER, FOR SOLUTION INTRAVENOUS at 15:07

## 2018-05-08 RX ADMIN — SODIUM CHLORIDE 1000 ML: 900 INJECTION, SOLUTION INTRAVENOUS at 14:02

## 2018-05-08 NOTE — ROUTINE PROCESS
Bedside and Verbal shift change report given to Bj Vogel (oncoming nurse) by Lawrence Gottlieb RN (offgoing nurse). Report included the following information SBAR, Kardex, Intake/Output, MAR, Accordion and Recent Results. Bedside and Verbal shift change report given to [facility] (oncoming nurse) by Mark Stover RN (offgoing nurse). Report included the following information SBAR, Kardex, Intake/Output, MAR, Accordion and Recent Results.

## 2018-05-08 NOTE — IP AVS SNAPSHOT
[address] 1007 [facility] 
[phone] Patient: [facility]a [facility] MRN: [id number] AFR:5/0/0828 About your hospitalization You were admitted on: May 8, 2018 You last received care in the:  [facility] 3 PROG CARE TELE 2 You were discharged on:  May 11, 2018 Why you were hospitalized Your primary diagnosis was:  Acute Gi Bleeding Your diagnoses also included:  Gastric Ulcer, Acute Blood Loss Anemia, Alcohol Abuse, Depression, Hypothyroidism, Sirs (Systemic Inflammatory Response Syndrome) (Hcc) Follow-up Information Follow up With Details Comments Contact Info Safia Garcia, DO On 5/14/2018 Hospital PCP f/u appointment on Monday May 14 @ 2:15 p.m. [address] [place] [address]. [address] [place] [address] 
[phone] Discharge Orders None A check nkechi indicates which time of day the medication should be taken. My Medications START taking these medications Instructions Each Dose to Equal  
 Morning Noon Evening Bedtime  
 butalbital-acetaminophen-caff -40 mg per capsule Commonly known as:  [employer] Your last dose was:  Not given in the hospital.  Resume home dose as needed. Take 1 Cap by mouth every four (4) hours as needed for Pain. 1 Cap  
    
   
   
   
  
 levothyroxine 50 mcg tablet Commonly known as:  SYNTHROID Start taking on:  5/12/2018 Your last dose was: Last dose given on 5/11/18 at 6:38 a.m. Take 1 Tab by mouth Daily (before breakfast). 50 mcg  
    
   
   
   
  
 multivit-min-iron-FA-lutein 8 mg iron-400 mcg-300 mcg Tab Commonly known as:  CENTRUM SILVER WOMEN Your last dose was:  Received a multivitamin on 5/11/18 at 8:23 a.m. Take 1 Tab by mouth daily. 1 Tab  
    
   
   
   
  
 omeprazole 20 mg capsule Commonly known as:  PRILOSEC Your last dose was:  Not given in the hospital.  Resume home dose as scheduled. Take 1 Cap by mouth two (2) times a day. 20 mg  
    
   
   
   
  
 potassium chloride 10 mEq tablet Commonly known as:  KLOR-CON Your last dose was:  Not given in the hospital.  Resume home dose as scheduled. Take 1 Tab by mouth daily. 10 mEq CONTINUE taking these medications Instructions Each Dose to Equal  
 Morning Noon Evening Bedtime  
 cyanocobalamin 1,000 mcg tablet Your last dose was:  Not given in the hospital.  Resume home dose as scheduled. Take 1,000 mcg by mouth daily. 1000 mcg DULoxetine 60 mg capsule Commonly known as:  CYMBALTA Your last dose was: Last dose given on 5/11/18 at 8:23 a.m. Take 120 mg by mouth daily. 120 mg  
    
   
   
   
  
 ferrous sulfate 325 mg (65 mg iron) tablet Your last dose was:  Not given in the hospital.  Resume home dose as scheduled. Take 325 mg by mouth Daily (before breakfast). 325 mg  
    
   
   
   
  
 HUMIRA 40 mg/0.8 mL injection Generic drug:  adalimumab Your last dose was:  Not given in the hospital.  Resume home dose as scheduled at discharge. 40 mg by SubCUTAneous route Once every 2 weeks. Indications: MODERATE TO SEVERE PLAQUE PSORIASIS  
 40 mg  
    
   
   
   
  
 magnesium oxide 400 mg tablet Commonly known as:  MAG-OX Your last dose was:  Not given in the hospital.  Resume home dose at discharge. Take 400 mg by mouth daily. 400 mg  
    
   
   
   
  
  
STOP taking these medications GOODY'S EXTRA STRENGTH PO Where to Get Your Medications Information on where to get these meds will be given to you by the nurse or doctor. ! Ask your nurse or doctor about these medications  
  butalbital-acetaminophen-caff -40 mg per capsule  
 levothyroxine 50 mcg tablet  
 multivit-min-iron-FA-lutein 8 mg iron-400 mcg-300 mcg Tab  
 omeprazole 20 mg capsule potassium chloride 10 mEq tablet Discharge Instructions Patient Discharge Instructions Winter Barnes / [id number] : 1965 Admitted 2018 Discharged: 2018 8:20 AM  
 
ACUTE DIAGNOSES: 
Acute GI bleeding 
anemia CHRONIC MEDICAL DIAGNOSES: 
Problem List as of 2018  Date Reviewed: 3/5/2017 Codes Class Noted - Resolved * (Principal)Acute GI bleeding ICD-10-CM: K92.2 ICD-9-CM: 578.9  2018 - Present Gastric ulcer ICD-10-CM: K25.9 ICD-9-CM: 531.90  2018 - Present SIRS (systemic inflammatory response syndrome) (HCC) ICD-10-CM: R65.10 ICD-9-CM: 995.90  2018 - Present GI bleed ICD-10-CM: K92.2 ICD-9-CM: 578.9  3/4/2017 - Present Acute blood loss anemia ICD-10-CM: D62 
ICD-9-CM: 285.1  3/4/2017 - Present Hypothyroidism (Chronic) ICD-10-CM: E03.9 ICD-9-CM: 244.9  6/3/2015 - Present Depression (Chronic) ICD-10-CM: F32.9 ICD-9-CM: 741  6/3/2015 - Present Microcytic anemia (Chronic) ICD-10-CM: D50.9 ICD-9-CM: 280.9  6/3/2015 - Present Alcohol abuse (Chronic) ICD-10-CM: F10.10 ICD-9-CM: 305.00  6/3/2015 - Present RESOLVED: SOB (shortness of breath) ICD-10-CM: R06.02 
ICD-9-CM: 786.05  6/3/2015 - 3/4/2017 DISCHARGE MEDICATIONS:  
 
 
 
· It is important that you take the medication exactly as they are prescribed. · Keep your medication in the bottles provided by the pharmacist and keep a list of the medication names, dosages, and times to be taken in your wallet. · Do not take other medications without consulting your doctor. NO Alcohol DIET:  Low fat, Low cholesterol ACTIVITY: Activity as tolerated ADDITIONAL INFORMATION: If you experience any of the following symptoms then please call your primary care physician or return to the emergency room if you cannot get hold of your doctor: Fever, chills, nausea, vomiting, diarrhea, change in mentation, falling, bleeding, shortness of breath. FOLLOW UP CARE: 
Dr. Padmini Rosado MD  you are to call and set up an appointment to see them with in 1 week. Follow-up with specialists at directed by them Information obtained by : 
I understand that if any problems occur once I am at home I am to contact my physician. I understand and acknowledge receipt of the instructions indicated above. Physician's or R.N.'s Signature                                                                  Date/Time Patient or Representative Signature                                                          Date/Time [employer] Announcement We are excited to announce that we are making your provider's discharge notes available to you in [employer]. You will see these notes when they are completed and signed by the physician that discharged you from your recent hospital stay. If you have any questions or concerns about any information you see in [employer], please call the Health Information Department where you were seen or reach out to your Primary Care Provider for more information about your plan of care. Introducing [facility]! Monica Liu introduces [employer] patient portal. Now you can access parts of your medical record, email your doctor's office, and request medication refills online. 1. In your internet browser, go to [email or web address] 2. Click on the First Time User? Click Here link in the Sign In box. You will see the New Member Sign Up page. 3. Enter your [employer] Access Code exactly as it appears below.  You will not need to use this code after youve completed the sign-up process. If you do not sign up before the expiration date, you must request a new code. · [employer] Access Code: [email or web address] Expires: 8/6/2018 12:38 PM 
 
4. Enter the last four digits of your Social Security Number (xxxx) and Date of Birth (mm/dd/yyyy) as indicated and click Submit. You will be taken to the next sign-up page. 5. Create a [employer] ID. This will be your [employer] login ID and cannot be changed, so think of one that is secure and easy to remember. 6. Create a [employer] password. You can change your password at any time. 7. Enter your Password Reset Question and Answer. This can be used at a later time if you forget your password. 8. Enter your e-mail address. You will receive e-mail notification when new information is available in [address] 9. Click Sign Up. You can now view and download portions of your medical record. 10. Click the Download Summary menu link to download a portable copy of your medical information. If you have questions, please visit the Frequently Asked Questions section of the [employer] website. Remember, [employer] is NOT to be used for urgent needs. For medical emergencies, dial 911. Now available from your iPhone and Android! Introducing Jeyson Castellanos As a [facility] patient, I wanted to make you aware of our electronic visit tool called Jeyson Castellanos. [facility] 24/7 allows you to connect within minutes with a medical provider 24 hours a day, seven days a week via a mobile device or tablet or logging into a secure website from your computer. You can access Jeyson Castellanos from anywhere in the [place].  
 
A virtual visit might be right for you when you have a simple condition and feel like you just dont want to get out of bed, or cant get away from work for an appointment, when your regular [facility] provider is not available (evenings, weekends or holidays), or when youre out of town and need minor care. Electronic visits cost only $49 and if the [facility] 24/7 provider determines a prescription is needed to treat your condition, one can be electronically transmitted to a nearby pharmacy*. Please take a moment to enroll today if you have not already done so. The enrollment process is free and takes just a few minutes. To enroll, please download the [employer] ghassan to your tablet or phone, or visit [email or web address] to enroll on your computer. And, as an [address] [place] [address] patient with a [employer] account, the results of your visits will be scanned into your electronic medical record and your primary care provider will be able to view the scanned results. We urge you to continue to see your regular [facility] provider for your ongoing medical care. And while your primary care provider may not be the one available when you seek a Jeyson Moasismariaelenafin virtual visit, the peace of mind you get from getting a real diagnosis real time can be priceless. For more information on [employer], view our Frequently Asked Questions (FAQs) at [email or web address]. Sincerely, 
 
Sallie Knowles MD 
Chief Medical Officer [address] [place] [address] *:  certain medications cannot be prescribed via [employer] Providers Seen During Your Hospitalization Provider Specialty Primary office phone Noemi Campos. Ramy Denis, [address] [facility] Emergency Medicine [phone] Jarad Lane MD [facility] [phone] Your Primary Care Physician (PCP) Primary Care Physician Office Phone Office Fax Alexandre, [facility] [phone] You are allergic to the following Allergen Reactions Pcn (Penicillins) Hives Recent Documentation Height Weight BMI OB Status Smoking Status 1.473 m 49.5 kg 22.81 kg/m2 Postmenopausal Never Smoker Emergency Contacts Name Discharge Info Relation Home Work Mobile Imperial Demario CAREGIVER [3] Friend [5] [phone] Patient Belongings The following personal items are in your possession at time of discharge: 
  Dental Appliances: None         Home Medications: None   Jewelry: Ring  Clothing: Pants, Footwear, Socks, Undergarments, Shirt    Other Valuables: Cell Phone, Money (comment) (not much) Please provide this summary of care documentation to your next provider. Signatures-by signing, you are acknowledging that this After Visit Summary has been reviewed with you and you have received a copy. Patient Signature:  ____________________________________________________________ Date:  ____________________________________________________________  
  
Luis Cart Provider Signature:  ____________________________________________________________ Date:  ____________________________________________________________

## 2018-05-08 NOTE — ED NOTES
TRANSFER - OUT REPORT:    Verbal report given to RN(name) on Omie Duty  being transferred to room 326(unit) for routine progression of care       Report consisted of patients Situation, Background, Assessment and   Recommendations(SBAR). Information from the following report(s) SBAR was reviewed with the receiving nurse. Lines:   Peripheral IV 05/08/18 Right Antecubital (Active)   Site Assessment Clean, dry, & intact 5/8/2018  1:04 PM   Phlebitis Assessment 0 5/8/2018  1:04 PM   Infiltration Assessment 0 5/8/2018  1:04 PM   Dressing Status Clean, dry, & intact 5/8/2018  1:04 PM       Peripheral IV 05/08/18 Left Hand (Active)   Site Assessment Clean, dry, & intact 5/8/2018  3:13 PM   Phlebitis Assessment 0 5/8/2018  3:13 PM   Infiltration Assessment 0 5/8/2018  3:13 PM   Dressing Status Clean, dry, & intact 5/8/2018  3:13 PM        Opportunity for questions and clarification was provided.       Patient transported with:   Registered Nurse

## 2018-05-08 NOTE — CONSULTS
[address] [place] [address]. Shahram Mosley M.D.  [phone]                    GASTROENTEROLOGY CONSULTATION NOTE              NAME:  Laila French   :   1965   MRN:   [id number]       Referring Physician:    Dr. Lorenzo Smith Date:   2018 4:28 PM    Chief Complaint:    Melena     History of Present Illness:    Patient is a 45 y. o. who is known to us from previous admission last year, presented to the ER today with a chief complaint of dyspnea on exertion and an episode of black tarry stools 2 days ago. She reports she has been having palpitations as well. She denies any previous bowel movements of black stools and has not had any further bowel movements yesterday or today. She has history of superficial gastric ulcers seen on endoscopy in 2017. She reports she has not been taking any acid suppression lately, was taking BC powder heavily over the last couple of weeks and reports she was back drinking wine for a couple of months, but quit 2 weeks ago. She denies abdominal pain, nausea or vomiting. PMH:  Past Medical History:   Diagnosis Date    Anemia     Asthma     Depression     Hypothyroid     Pancreatitis     7 years ago    Psychiatric disorder     Seizures (HCC)        PSH:  Past Surgical History:   Procedure Laterality Date    COLONOSCOPY  2014         EGD  2014         HX CHOLECYSTECTOMY      UPPER GI ENDOSCOPY,BIOPSY  2015            Allergies: Allergies   Allergen Reactions    Pcn [Penicillins] Hives       Home Medications:  Prior to Admission Medications   Prescriptions Last Dose Informant Patient Reported? Taking? DULoxetine (CYMBALTA) 60 mg capsule 2018 at AM Self Yes Yes   Sig: Take 120 mg by mouth daily. adalimumab (HUMIRA) 40 mg/0.8 mL injection 2018 Self Yes Yes   Si mg by SubCUTAneous route Once every 2 weeks.  Indications: MODERATE TO SEVERE PLAQUE PSORIASIS   aspirin/acetaminophen/caffeine (GOODY'S EXTRA STRENGTH PO) 5/7/2018 at Unknown time Self Yes Yes   Sig: Take 1-2 Packets by mouth three (3) times daily. cyanocobalamin 1,000 mcg tablet 5/7/2018 at Unknown time Self Yes Yes   Sig: Take 1,000 mcg by mouth daily. ferrous sulfate 325 mg (65 mg iron) tablet 5/7/2018 at Unknown time Self Yes Yes   Sig: Take 325 mg by mouth Daily (before breakfast). magnesium oxide (MAG-OX) 400 mg tablet 5/7/2018 at Unknown time Self Yes Yes   Sig: Take 400 mg by mouth daily.       Facility-Administered Medications: None       Hospital Medications:  Current Facility-Administered Medications   Medication Dose Route Frequency    0.9% sodium chloride infusion 250 mL  250 mL IntraVENous PRN    sodium chloride (NS) flush 5-10 mL  5-10 mL IntraVENous Q8H    sodium chloride (NS) flush 5-10 mL  5-10 mL IntraVENous PRN    acetaminophen (TYLENOL) tablet 650 mg  650 mg Oral Q4H PRN    morphine injection 2 mg  2 mg IntraVENous Q4H PRN    naloxone (NARCAN) injection 0.4 mg  0.4 mg IntraVENous PRN    diphenhydrAMINE (BENADRYL) injection 12.5 mg  12.5 mg IntraVENous Q4H PRN    prochlorperazine (COMPAZINE) injection 5 mg  5 mg IntraVENous Q8H PRN    [START ON 5/9/2018] pantoprazole (PROTONIX) 40 mg in sodium chloride 0.9% 10 mL injection  40 mg IntraVENous Q12H    LORazepam (ATIVAN) injection 2 mg  2 mg IntraVENous Q30MIN    LORazepam (ATIVAN) injection 2 mg  2 mg IntraVENous Q1H PRN    LORazepam (ATIVAN) injection 4 mg  4 mg IntraVENous Y4Y PRN    folic acid (FOLVITE) tablet 1 mg  1 mg Oral DAILY    Thiamine Mononitrate (B-1) tablet 100 mg  100 mg Oral DAILY    [START ON 5/9/2018] multivitamin with iron tablet 1 Tab  1 Tab Oral DAILY    butalbital-acetaminophen-caffeine (FIORICET, ESGIC) -40 mg per tablet 1 Tab  1 Tab Oral ONCE       Social History:  Social History   Substance Use Topics    Smoking status: Never Smoker    Smokeless tobacco: Never Used    Alcohol use 1.8 oz/week     3 Cans of beer per week      Comment: 2-3 beers every other day. Family History:  Family History   Problem Relation Age of Onset    Cancer Neg Hx     Diabetes Neg Hx        Review of Systems:  Constitutional: negative fever, negative chills, negative weight loss  Eyes:   negative visual changes  ENT:   negative sore throat, tongue or lip swelling  Respiratory:  negative cough, negative dyspnea  Cards:  negative for chest pain, palpitations, lower extremity edema  GI:   See HPI  :  negative for frequency, dysuria  Integument:  negative for rash and pruritus  Heme:  negative for easy bruising and gum/nose bleeding  Musculoskel: negative for myalgias,  back pain and muscle weakness  Neuro: negative for headaches, dizziness, vertigo  Psych:  negative for feelings of anxiety, depression     Objective:   Patient Vitals for the past 8 hrs:   BP Temp Pulse Resp SpO2 Height Weight   05/08/18 1555 115/57 98.8 °F (37.1 °C) (!) 114 21 99 % - -   05/08/18 1525 128/75 98.7 °F (37.1 °C) (!) 114 22 100 % - -   05/08/18 1509 123/61 98.3 °F (36.8 °C) (!) 115 18 100 % - -   05/08/18 1316 - - (!) 102 21 100 % - -   05/08/18 1315 119/75 - - - - - -   05/08/18 1241 127/78 98.1 °F (36.7 °C) (!) 141 24 100 % 4' 10\" (1.473 m) 47.6 kg (105 lb)             EXAM:     NEURO-alert, oriented x3, affect appropriate, no focal neurological deficits, moves all extremities well, no involuntary movements   HEENT-Head: Normocephalic, no lesions, without obvious abnormality. LUNGS-clear to auscultation bilaterally    COR-regular rate and rhythym, tachycardic     ABD- soft, non-tender.  Bowel sounds normal. No masses,  no organomegaly     EXT-no edema    Skin - No rash     Data Review     Recent Labs      05/08/18   1300   WBC  12.1*   HGB  6.7*   HCT  20.1*   PLT  251     Recent Labs      05/08/18   1300   NA  137   K  3.8   CL  102   CO2  21   BUN  23*   CREA  0.77   GLU  131*   CA  8.6     Recent Labs      05/08/18   1300   SGOT  31   AP  93   TP  6.8   ALB  3.4*   GLOB  3.4   LPSE  122 No results for input(s): INR, PTP, APTT in the last 72 hours. No lab exists for component: INREXT    Patient Active Problem List   Diagnosis Code    Hypothyroidism E03.9    Depression F32.9    Microcytic anemia D50.9    Alcohol abuse F10.10    GI bleed K92.2    Acute blood loss anemia D62    Acute GI bleeding K92.2    Gastric ulcer K25.9    SIRS (systemic inflammatory response syndrome) (HCC) R65.10       Assessment and Plan:  Acute blood loss anemia, likely on top of chronic anemia with a recent episode of melena concerning for an upper GI bleeding source likely related to her heavy intake of BC powder. Noted CT scan done in the ER showing no acute bleeding. She has not had any further bowel movements over the last 2 days. Agree with PRBC transfusion today and IV PPI  Will continue with NPO except ice chips  Will plan on endoscopy tomorrow with Dr. David Shine, if unrevealing, will need colonoscopy and capsule endoscopy if needed as well as she may have underlying angioectasias as well contributing to her chronic anemia. Counseled regarding alcohol cessation and chronic abstinence. Thanks for allowing me to participate in the care of this patient.   Signed By: Darnell London MD     5/8/2018  4:28 PM

## 2018-05-08 NOTE — IP AVS SNAPSHOT
[address] 
[phone] Patient: Radha Barakat MRN: [id number] NJD:[birth year] A check nkechi indicates which time of day the medication should be taken. My Medications START taking these medications Instructions Each Dose to Equal  
 Morning Noon Evening Bedtime  
 butalbital-acetaminophen-caff -40 mg per capsule Commonly known as:  [employer] Your last dose was:  Not given in the hospital.  Resume home dose as needed. Take 1 Cap by mouth every four (4) hours as needed for Pain. 1 Cap  
    
   
   
   
  
 levothyroxine 50 mcg tablet Commonly known as:  SYNTHROID Start taking on:  5/12/2018 Your last dose was: Last dose given on 5/11/18 at 6:38 a.m. Take 1 Tab by mouth Daily (before breakfast). 50 mcg  
    
   
   
   
  
 multivit-min-iron-FA-lutein 8 mg iron-400 mcg-300 mcg Tab Commonly known as:  CENTRUM SILVER WOMEN Your last dose was:  Received a multivitamin on 5/11/18 at 8:23 a.m. Take 1 Tab by mouth daily. 1 Tab  
    
   
   
   
  
 omeprazole 20 mg capsule Commonly known as:  PRILOSEC Your last dose was:  Not given in the hospital.  Resume home dose as scheduled. Take 1 Cap by mouth two (2) times a day. 20 mg  
    
   
   
   
  
 potassium chloride 10 mEq tablet Commonly known as:  KLOR-CON Your last dose was:  Not given in the hospital.  Resume home dose as scheduled. Take 1 Tab by mouth daily. 10 mEq CONTINUE taking these medications Instructions Each Dose to Equal  
 Morning Noon Evening Bedtime  
 cyanocobalamin 1,000 mcg tablet Your last dose was:  Not given in the hospital.  Resume home dose as scheduled. Take 1,000 mcg by mouth daily. 1000 mcg DULoxetine 60 mg capsule Commonly known as:  CYMBALTA Your last dose was: Last dose given on 5/11/18 at 8:23 a.m. Take 120 mg by mouth daily. 120 mg  
    
   
   
   
  
 ferrous sulfate 325 mg (65 mg iron) tablet Your last dose was:  Not given in the hospital.  Resume home dose as scheduled. Take 325 mg by mouth Daily (before breakfast). 325 mg  
    
   
   
   
  
 HUMIRA 40 mg/0.8 mL injection Generic drug:  adalimumab Your last dose was:  Not given in the hospital.  Resume home dose as scheduled at discharge. 40 mg by SubCUTAneous route Once every 2 weeks. Indications: MODERATE TO SEVERE PLAQUE PSORIASIS  
 40 mg  
    
   
   
   
  
 magnesium oxide 400 mg tablet Commonly known as:  MAG-OX Your last dose was:  Not given in the hospital.  Resume home dose at discharge. Take 400 mg by mouth daily. 400 mg  
    
   
   
   
  
  
STOP taking these medications GOODY'S EXTRA STRENGTH PO Where to Get Your Medications Information on where to get these meds will be given to you by the nurse or doctor. ! Ask your nurse or doctor about these medications  
  butalbital-acetaminophen-caff -40 mg per capsule  
 levothyroxine 50 mcg tablet  
 multivit-min-iron-FA-lutein 8 mg iron-400 mcg-300 mcg Tab  
 omeprazole 20 mg capsule  
 potassium chloride 10 mEq tablet

## 2018-05-08 NOTE — H&P
[address] [address]  [phone]    Admission History and Physical      NAME:              Aimee Bar   :   1965   MRN:  [id number]     PCP:  Colby Jade MD     Date:     2018     Chief  Complaint: Melena stools    History Of Presenting Illness:       Ms. Calli Doss is a 45 y.o. female who is being admitted for acute GI bleeding. Ms. Calli Doss presented to our Emergency Department today complaining of melena stools for the past 2 days. Associated with a moderate aching upper abdominal discomfort, exertional SOB and lightheadedness. She denies any nausea, vomiting or hematemesis. She has been taking BC powder for sinus disease for the past 1-2 weeks as well as drinking alcohol which she says is intermittent. She was found to be anemic with a Hgb of 6.7. Her prior EGD had shown gastric ulcers. She will be admitted for further management. Allergies   Allergen Reactions    Pcn [Penicillins] Hives       Prior to Admission medications    Medication Sig Start Date End Date Taking? Authorizing Provider   fish oil-omega-3 fatty acids (FISH OIL) 340-1,000 mg capsule Take 1 Cap by mouth daily as needed (Psoriasis). Yes Historical Provider   DULoxetine (CYMBALTA) 60 mg capsule Take 120 mg by mouth daily. Yes Historical Provider   omeprazole (PRILOSEC) 20 mg capsule Take 1 Cap by mouth two (2) times a day. 3/7/17   Manuela Ewing MD   albuterol (PROVENTIL HFA, VENTOLIN HFA, PROAIR HFA) 90 mcg/actuation inhaler Take 2 Puffs by inhalation every six (6) hours as needed for Wheezing. Historical Provider   ePHEDrine-guaiFENesin (PRIMATENE ASTHMA) 12.5-200 mg tab Take 1 Tab by mouth daily as needed.     Historical Provider       Past Medical History:   Diagnosis Date    Anemia     Asthma     Depression     Hypothyroid  Pancreatitis     7 years ago    Psychiatric disorder     Seizures [facility])         Past Surgical History:   Procedure Laterality Date    COLONOSCOPY  12/4/2014         EGD  12/4/2014         HX CHOLECYSTECTOMY      UPPER GI ENDOSCOPY,BIOPSY  6/4/2015            Social History   Substance Use Topics    Smoking status: Never Smoker    Smokeless tobacco: Never Used    Alcohol use 1.8 oz/week     3 Cans of beer per week      Comment: 2-3 beers every other day. Family History   Problem Relation Age of Onset    Cancer Neg Hx     Diabetes Neg Hx         Review of Systems:    Constitutional ROS: no fever, chills, rigors or night sweats  Respiratory ROS: no cough, sputum, hemoptysis, dyspnea or pleuritic pain. Cardiovascular ROS: no chest pain, palpitations, orthopnea, PND or syncope  Endocrine ROS: no polydispsia, polyuria, heat or cold intolerance or major weight change. Gastrointestinal ROS: no dysphagia, nausea, vomiting or diarrhea    Genito-Urinary ROS: no dysuria, frequency, hematuria, retention or flank pain  Musculoskeletal ROS: no joint pain, swelling or muscular tenderness  Neurological ROS: no headache, confusion, focal weakness or any other neurological symptoms  Psychiatric ROS: no depression, anxiety, mood swings  Dermatological ROS: no rash, pruritis, or urticaria  Heme-Lymph ROS: no swollen glands, bleeding    Examination:    Constitutional:    Visit Vitals    /75    Pulse (!) 102    Temp 98.1 °F (36.7 °C)    Resp 21    Ht 4' 10\" (1.473 m)    Wt 47.6 kg (105 lb)    SpO2 100%    BMI 21.95 kg/m2         General:  Weak and ill looking patient in no acute distress  Eyes: Pale conjunctivae, PERRLA with no discharge.  Normal eye movements  Ear, Nose, Mouth & Throat: No ottorrhea, rhinorrhea, non tender sinuses, moist mucous membranes  Respiratory:  No accessory muscle use, clear breath sounds without crackles or wheezes  Cardiovascular:  No JVD or murmurs, regular and normal S1, S2 without thrills, bruits or peripheral edema. Capillary refil+, good distal pulses  GI & :  Soft abdomen, epigastric discomfort, non-distended, normoactive bowel sounds with no palpable organomegaly  Heme:  No cervical or axillary adenopathy. Musculoskeletal:  No cyanosis, clubbing, atrophy or deformities  Skin:  No rashes, bruising or ulcers   Neurological: Awake and alert, speech is clear, CNs 2-12 are grossly intact and otherwise non focal  Psychiatric:  Has a fair insight and is oriented x 3  ________________________________________________________________________    Data Review:    Labs:    Recent Labs      05/08/18   1300   WBC  12.1*   HGB  6.7*   HCT  20.1*   PLT  251     Recent Labs      05/08/18   1300   NA  137   K  3.8   CL  102   CO2  21   GLU  131*   BUN  23*   CREA  0.77   CA  8.6   ALB  3.4*   SGOT  31   ALT  31     No components found for: GLPOC  No results for input(s): PH, PCO2, PO2, HCO3, FIO2 in the last 72 hours. No results for input(s): INR in the last 72 hours. No lab exists for component: INREXT    Radiological Studies:      CT abdomen done and is pending     I have also reviewed available old medical records. Assessment & Impression:     Ms. Kodak Omer is a 45 y.o. female being evaluated for:     Principal Problem:    Acute GI bleeding (5/8/2018)    Active Problems:    Hypothyroidism (6/3/2015)      Depression (6/3/2015)      Alcohol abuse (6/3/2015)      Acute blood loss anemia (3/4/2017)      Gastric ulcer (5/8/2018)      SIRS (systemic inflammatory response syndrome) ([address] (5/8/2018)         Plan of management:    Acute GI bleeding (5/8/2018)/ Hx Gastric ulcer (5/8/2018): presumed upper GI bleed. Trigger seems to be BC powder and ethanol use. Admit to hospital. Start IV pantoprazole. Keep NPO. ED has already discussed with GI who will consult for an EGD. Follow CT abdomen    Acute blood loss anemia (3/4/2017): due to GI bleed. Transfuse 2 units PRBCs. Monitor Hgb.  GI to see for an EGD    Alcohol abuse (6/3/2015): counseled on cessation. Risk for withdrawal seems low [facility] protocol for now    SIRS (systemic inflammatory response syndrome) ([facility] [address] (5/8/2018): likely from the GI bleed.  Monitor     Depression (6/3/2015): resume Cymbalta    Hypothyroidism (6/3/2015): not taking any medications and prior TSH was normal. Out patient follow up    Psoriasis POA: hold humira    Code Status:  Full    Surrogate decision maker: Family    Risk of deterioration: high      Total time spent for the care of the patient: [address] discussed with: Patient, Family, Nursing Staff, ED physician and Dr Remington Page who will assume further care henceforth    Discussed:  Code Status, Care Plan and D/C Planning    Prophylaxis:  H2B/PPI    Probable Disposition:  Home w/Family           ___________________________________________________    Attending Physician: Jane Reese MD

## 2018-05-08 NOTE — PROGRESS NOTES
TRANSFER - IN REPORT:    Verbal report received from [facility] (name) on Joann Teixeira  being received from ED (unit) for routine progression of care      Report consisted of patients Situation, Background, Assessment and   Recommendations(SBAR). Information from the following report(s) SBAR, Kardex, ED Summary, Intake/Output, MAR, Recent Results, Med Rec Status and Cardiac Rhythm S Tachy was reviewed with the receiving nurse. Opportunity for questions and clarification was provided. Assessment completed upon patients arrival to unit and care assumed. Primary Nurse Stephanie Prince RN and Alexsandra Winter RN performed a dual skin assessment on this patient No impairment noted, (skin fine, pt refused to show rosie area )  Joe score is     1659 Pt had Ativan scheduled every 30 min, clarified with Md. Travon Osorio, he is going to remove it. Md Ezequiel Salas said pt can have ice chips. Bedside and Verbal shift change report given to [address] [place] [address] (oncoming nurse) by Imelda De La Garza RN (offgoing nurse). Report included the following information SBAR, Kardex, Intake/Output, MAR and Recent Results.

## 2018-05-08 NOTE — ED PROVIDER NOTES
HPI Comments: 45 y.o. female with past medical history significant for anemia, pancreatitis, seizures, asthma, hypothyroid, and depression who presents from home with chief complaint of melena. Pt had an episode of \"large amounts\" of dark black stools 2 days ago. Since that time, she has been experiencing worsening SOB and palpitations. She states she feels as if her Thomes Loosen is racing\" and has had associated chills, lightheadedness, and some L-sided abd pain. She has a hx of similar symptoms and had ulcers at that time with 2 episodes requiring transfusion. Pt has been taking BC sinus powder medication for the past 1-2 weeks. She denies fever, rash, leg swelling, or urinary sx. There are no other acute medical concerns at this time. Old Chart Review: Pt was admitted 3/4/17-3/7/17 for GI bleed with melena. EGD showed small gastric ulcers. Pt also has hx of EtOH abuse. Social hx: no tobacco use; previous EtOH use (\"quit a couple weeks ago,\" previously drank [place] couple bottles of wine/week\")  PCP: Elgin Parkinson MD    Note written by Simran Schaffer, as dictated by Franci Resendiz. Zackery Mcnair MD 12:58 PM    The history is provided by the patient. No  was used. Past Medical History:   Diagnosis Date    Anemia     Asthma     Depression     Hypothyroid     Pancreatitis     7 years ago    Psychiatric disorder     Seizures ([facility] [address]        Past Surgical History:   Procedure Laterality Date    COLONOSCOPY  12/4/2014         EGD  12/4/2014         HX CHOLECYSTECTOMY      UPPER GI ENDOSCOPY,BIOPSY  6/4/2015              Family History:   Problem Relation Age of Onset    Cancer Neg Hx     Diabetes Neg Hx        Social History     Social History    Marital status:      Spouse name: N/A    Number of children: N/A    Years of education: N/A     Occupational History    Not on file.      Social History Main Topics    Smoking status: Never Smoker    Smokeless tobacco: Never Used  Alcohol use 1.8 oz/week     3 Cans of beer per week      Comment: 2-3 beers every other day.  Drug use: No    Sexual activity: Not on file     Other Topics Concern    Not on file     Social History Narrative         ALLERGIES: Pcn [penicillins]    Review of Systems   Constitutional: Positive for chills. Negative for fever. HENT: Negative for ear pain and sore throat. Eyes: Negative for pain. Respiratory: Positive for shortness of breath. Negative for chest tightness. Cardiovascular: Positive for palpitations. Negative for chest pain and leg swelling. Gastrointestinal: Positive for abdominal pain and blood in stool. Negative for nausea and vomiting. Genitourinary: Negative for decreased urine volume, difficulty urinating, dysuria, flank pain, frequency and hematuria. Musculoskeletal: Negative for back pain. Skin: Negative for rash. Neurological: Positive for light-headedness. Negative for headaches. All other systems reviewed and are negative. Vitals:    05/08/18 1241 05/08/18 1315 05/08/18 1316   BP: 127/78 119/75    Pulse: (!) 141  (!) 102   Resp: 24  21   Temp: 98.1 °F (36.7 °C)     SpO2: 100%  100%   Weight: 47.6 kg (105 lb)     Height: 4' 10\" (1.473 m)              Physical Exam   Constitutional: She appears well-developed and well-nourished. HENT:   Head: Normocephalic and atraumatic. Mouth/Throat: Oropharynx is clear and moist.   Eyes: Conjunctivae and EOM are normal. Pupils are equal, round, and reactive to light. No scleral icterus. Neck: Neck supple. No tracheal deviation present. Cardiovascular: Normal rate, regular rhythm, normal heart sounds and intact distal pulses. Pulmonary/Chest: Effort normal and breath sounds normal. No respiratory distress. Abdominal: Soft. She exhibits no distension. There is no tenderness. There is no rebound and no guarding.    Genitourinary:   Genitourinary Comments: Rectal exam showed bright red blood   Musculoskeletal: She exhibits no edema. Neurological: She is alert. Skin: Skin is warm and dry. Psychiatric: She has a normal mood and affect. Nursing note and vitals reviewed. Note written by Simran Laughlin, as dictated by Nayla Llanes. Naima Concepcion MD 1:01 PM    MDM  44 yo female with h/o GI bleed, gastric ulcers, alcohol abuse p/w black stool, tachycardia, pallor.  - Labs reveal hgb of 6.7  - EKG shows ST at 127, normal intervals and axis, non-specific ST/ T.      ED Course       Procedures    CONSULT NOTE:  2:06 PM Beto Concepcion MD spoke with Dr. Renzo Card for Gastroenterology. Discussed available diagnostic tests and clinical findings. Dr. Marjan Carvalho will see the pt. CONSULT NOTE:  2:32 PM Beto Concepcion MD spoke with Dr. Haily Escobar, Consult for Hospitalist.  Discussed available diagnostic tests and clinical findings. Dr. Haily Escobar will admit the pt. A/P:   1. GI bleed  2. Anemia  - transfusion  - gi consult  - admit to hospitalist    Total critical care time spent exclusive of procedures:  33 minutes    Beto Concepcion MD

## 2018-05-08 NOTE — ED TRIAGE NOTES
Patient arrives with the complaint of 1 episode of dark black stools, hx of bleeding ulcer. Also states some shortness of breath and feeling like her hands and fingers are tingling because she is breathing so fast.  Patient is pale in triage.

## 2018-05-08 NOTE — PROGRESS NOTES
[facility]: MED RECONCILIATION    Comments/Recommendations:    Patient states she is due for her Humira injection tomorrow. She currently has the Humira pen in her purse because she states she would like it to stay with her until then while changing rooms from ED to the floor. Pharmacist notified patient of policy and she is aware.  Patient states she ran out of her albuterol HFA and advair inhalers and would like an additional script for these at discharge. Medications added:     · Goody's powder  · Humira  · Iron  · B12  · magnesium    Medications removed:    · Primatene  · Fish oil  · Omeprazole   · albuterol    Information obtained from: patient, rx query    Significant PMH/Disease States:   Past Medical History:   Diagnosis Date    Anemia     Asthma     Depression     Hypothyroid     Pancreatitis     7 years ago    Psychiatric disorder     Seizures ([facility] [address]        Chief Complaint for this Admission:   Chief Complaint   Patient presents with    Melena    Shortness of Breath       Allergies: Pcn [penicillins]    Prior to Admission Medications:     Prior to Admission Medications   Prescriptions Last Dose Informant Patient Reported? Taking? DULoxetine (CYMBALTA) 60 mg capsule 2018 at AM Self Yes Yes   Sig: Take 120 mg by mouth daily. adalimumab (HUMIRA) 40 mg/0.8 mL injection 2018 Self Yes Yes   Si mg by SubCUTAneous route Once every 2 weeks. Indications: MODERATE TO SEVERE PLAQUE PSORIASIS   aspirin/acetaminophen/caffeine (GOODY'S EXTRA STRENGTH PO) 2018 at Unknown time Self Yes Yes   Sig: Take 1-2 Packets by mouth three (3) times daily. cyanocobalamin 1,000 mcg tablet 2018 at Unknown time Self Yes Yes   Sig: Take 1,000 mcg by mouth daily. ferrous sulfate 325 mg (65 mg iron) tablet 2018 at Unknown time Self Yes Yes   Sig: Take 325 mg by mouth Daily (before breakfast).    magnesium oxide (MAG-OX) 400 mg tablet 2018 at Unknown time Self Yes Yes   Sig: Take 400 mg by mouth daily.       Facility-Administered Medications: None           CRAIG Garjeda   Contact: [id number]

## 2018-05-09 ENCOUNTER — ANESTHESIA EVENT (OUTPATIENT)
Dept: ENDOSCOPY | Age: 53
DRG: 378 | End: 2018-05-09
Payer: MEDICARE

## 2018-05-09 ENCOUNTER — ANESTHESIA (OUTPATIENT)
Dept: ENDOSCOPY | Age: 53
DRG: 378 | End: 2018-05-09
Payer: MEDICARE

## 2018-05-09 LAB
ABO + RH BLD: NORMAL
ALBUMIN SERPL-MCNC: 2.9 G/DL (ref 3.5–5)
ALBUMIN/GLOB SERPL: 1.1 {RATIO} (ref 1.1–2.2)
ALP SERPL-CCNC: 69 U/L (ref 45–117)
ALT SERPL-CCNC: 20 U/L (ref 12–78)
ANION GAP SERPL CALC-SCNC: 9 MMOL/L (ref 5–15)
AST SERPL-CCNC: 34 U/L (ref 15–37)
BASOPHILS # BLD: 0 K/UL (ref 0–0.1)
BASOPHILS NFR BLD: 0 % (ref 0–1)
BILIRUB SERPL-MCNC: 1.4 MG/DL (ref 0.2–1)
BLD PROD TYP BPU: NORMAL
BLD PROD TYP BPU: NORMAL
BLOOD GROUP ANTIBODIES SERPL: NORMAL
BPU ID: NORMAL
BPU ID: NORMAL
BUN SERPL-MCNC: 17 MG/DL (ref 6–20)
BUN/CREAT SERPL: 27 (ref 12–20)
CALCIUM SERPL-MCNC: 7.9 MG/DL (ref 8.5–10.1)
CHLORIDE SERPL-SCNC: 107 MMOL/L (ref 97–108)
CO2 SERPL-SCNC: 21 MMOL/L (ref 21–32)
CREAT SERPL-MCNC: 0.62 MG/DL (ref 0.55–1.02)
CROSSMATCH RESULT,%XM: NORMAL
CROSSMATCH RESULT,%XM: NORMAL
DIFFERENTIAL METHOD BLD: ABNORMAL
EOSINOPHIL # BLD: 0.1 K/UL (ref 0–0.4)
EOSINOPHIL NFR BLD: 1 % (ref 0–7)
ERYTHROCYTE [DISTWIDTH] IN BLOOD BY AUTOMATED COUNT: 17.1 % (ref 11.5–14.5)
GLOBULIN SER CALC-MCNC: 2.6 G/DL (ref 2–4)
GLUCOSE SERPL-MCNC: 87 MG/DL (ref 65–100)
H PYLORI FROM TISSUE: NEGATIVE
HCT VFR BLD AUTO: 24.7 % (ref 35–47)
HGB BLD-MCNC: 8.2 G/DL (ref 11.5–16)
IMM GRANULOCYTES # BLD: 0 K/UL (ref 0–0.04)
IMM GRANULOCYTES NFR BLD AUTO: 0 % (ref 0–0.5)
KIT LOT NO., HCLOLOT: NORMAL
LYMPHOCYTES # BLD: 2.6 K/UL (ref 0.8–3.5)
LYMPHOCYTES NFR BLD: 34 % (ref 12–49)
MAGNESIUM SERPL-MCNC: 1.9 MG/DL (ref 1.6–2.4)
MCH RBC QN AUTO: 30.5 PG (ref 26–34)
MCHC RBC AUTO-ENTMCNC: 33.2 G/DL (ref 30–36.5)
MCV RBC AUTO: 91.8 FL (ref 80–99)
MONOCYTES # BLD: 0.8 K/UL (ref 0–1)
MONOCYTES NFR BLD: 11 % (ref 5–13)
NEGATIVE CONTROL: NEGATIVE
NEUTS SEG # BLD: 4.1 K/UL (ref 1.8–8)
NEUTS SEG NFR BLD: 54 % (ref 32–75)
NRBC # BLD: 0 K/UL (ref 0–0.01)
NRBC BLD-RTO: 0 PER 100 WBC
PHOSPHATE SERPL-MCNC: 3 MG/DL (ref 2.6–4.7)
PLATELET # BLD AUTO: 137 K/UL (ref 150–400)
PMV BLD AUTO: 10.1 FL (ref 8.9–12.9)
POSITIVE CONTROL: POSITIVE
POTASSIUM SERPL-SCNC: 3.8 MMOL/L (ref 3.5–5.1)
PROT SERPL-MCNC: 5.5 G/DL (ref 6.4–8.2)
RBC # BLD AUTO: 2.69 M/UL (ref 3.8–5.2)
SODIUM SERPL-SCNC: 137 MMOL/L (ref 136–145)
SPECIMEN EXP DATE BLD: NORMAL
STATUS OF UNIT,%ST: NORMAL
STATUS OF UNIT,%ST: NORMAL
TSH SERPL DL<=0.05 MIU/L-ACNC: 5.17 UIU/ML (ref 0.36–3.74)
UNIT DIVISION, %UDIV: 0
UNIT DIVISION, %UDIV: 0
WBC # BLD AUTO: 7.6 K/UL (ref 3.6–11)

## 2018-05-09 PROCEDURE — 74011250636 HC RX REV CODE- 250/636: Performed by: FAMILY MEDICINE

## 2018-05-09 PROCEDURE — 80053 COMPREHEN METABOLIC PANEL: CPT | Performed by: INTERNAL MEDICINE

## 2018-05-09 PROCEDURE — 74011000250 HC RX REV CODE- 250

## 2018-05-09 PROCEDURE — 74011250636 HC RX REV CODE- 250/636

## 2018-05-09 PROCEDURE — 74011250636 HC RX REV CODE- 250/636: Performed by: INTERNAL MEDICINE

## 2018-05-09 PROCEDURE — 84443 ASSAY THYROID STIM HORMONE: CPT | Performed by: FAMILY MEDICINE

## 2018-05-09 PROCEDURE — 77030032490 HC SLV COMPR SCD KNE COVD -B

## 2018-05-09 PROCEDURE — 84100 ASSAY OF PHOSPHORUS: CPT | Performed by: INTERNAL MEDICINE

## 2018-05-09 PROCEDURE — 87077 CULTURE AEROBIC IDENTIFY: CPT | Performed by: INTERNAL MEDICINE

## 2018-05-09 PROCEDURE — 74011250637 HC RX REV CODE- 250/637: Performed by: INTERNAL MEDICINE

## 2018-05-09 PROCEDURE — 83735 ASSAY OF MAGNESIUM: CPT | Performed by: INTERNAL MEDICINE

## 2018-05-09 PROCEDURE — 77030003657 HC NDL SCLER BSC -B: Performed by: INTERNAL MEDICINE

## 2018-05-09 PROCEDURE — 3E0G8GC INTRODUCTION OF OTHER THERAPEUTIC SUBSTANCE INTO UPPER GI, VIA NATURAL OR ARTIFICIAL OPENING ENDOSCOPIC: ICD-10-PCS | Performed by: INTERNAL MEDICINE

## 2018-05-09 PROCEDURE — C9113 INJ PANTOPRAZOLE SODIUM, VIA: HCPCS | Performed by: INTERNAL MEDICINE

## 2018-05-09 PROCEDURE — 65660000000 HC RM CCU STEPDOWN

## 2018-05-09 PROCEDURE — 76040000019: Performed by: INTERNAL MEDICINE

## 2018-05-09 PROCEDURE — 77030010857 HC CATH PRB GLD BSC -C: Performed by: INTERNAL MEDICINE

## 2018-05-09 PROCEDURE — 76060000031 HC ANESTHESIA FIRST 0.5 HR: Performed by: INTERNAL MEDICINE

## 2018-05-09 PROCEDURE — 85025 COMPLETE CBC W/AUTO DIFF WBC: CPT | Performed by: INTERNAL MEDICINE

## 2018-05-09 PROCEDURE — 77030009426 HC FCPS BIOP ENDOSC BSC -B: Performed by: INTERNAL MEDICINE

## 2018-05-09 PROCEDURE — 0D598ZZ DESTRUCTION OF DUODENUM, VIA NATURAL OR ARTIFICIAL OPENING ENDOSCOPIC: ICD-10-PCS | Performed by: INTERNAL MEDICINE

## 2018-05-09 PROCEDURE — 36415 COLL VENOUS BLD VENIPUNCTURE: CPT | Performed by: INTERNAL MEDICINE

## 2018-05-09 RX ORDER — FLUMAZENIL 0.1 MG/ML
0.2 INJECTION INTRAVENOUS
Status: DISCONTINUED | OUTPATIENT
Start: 2018-05-09 | End: 2018-05-09 | Stop reason: HOSPADM

## 2018-05-09 RX ORDER — LIDOCAINE HYDROCHLORIDE 20 MG/ML
INJECTION, SOLUTION EPIDURAL; INFILTRATION; INTRACAUDAL; PERINEURAL AS NEEDED
Status: DISCONTINUED | OUTPATIENT
Start: 2018-05-09 | End: 2018-05-09 | Stop reason: HOSPADM

## 2018-05-09 RX ORDER — PROPOFOL 10 MG/ML
INJECTION, EMULSION INTRAVENOUS
Status: DISCONTINUED | OUTPATIENT
Start: 2018-05-09 | End: 2018-05-09 | Stop reason: HOSPADM

## 2018-05-09 RX ORDER — DEXTROMETHORPHAN/PSEUDOEPHED 2.5-7.5/.8
1.2 DROPS ORAL
Status: DISCONTINUED | OUTPATIENT
Start: 2018-05-09 | End: 2018-05-09 | Stop reason: HOSPADM

## 2018-05-09 RX ORDER — LORAZEPAM 2 MG/ML
1 INJECTION INTRAMUSCULAR
Status: DISCONTINUED | OUTPATIENT
Start: 2018-05-09 | End: 2018-05-11 | Stop reason: HOSPADM

## 2018-05-09 RX ORDER — ATROPINE SULFATE 0.1 MG/ML
0.4 INJECTION INTRAVENOUS
Status: DISCONTINUED | OUTPATIENT
Start: 2018-05-09 | End: 2018-05-09 | Stop reason: HOSPADM

## 2018-05-09 RX ORDER — EPINEPHRINE 0.1 MG/ML
1 INJECTION INTRACARDIAC; INTRAVENOUS
Status: DISCONTINUED | OUTPATIENT
Start: 2018-05-09 | End: 2018-05-09 | Stop reason: HOSPADM

## 2018-05-09 RX ORDER — SODIUM CHLORIDE 9 MG/ML
50 INJECTION, SOLUTION INTRAVENOUS CONTINUOUS
Status: DISPENSED | OUTPATIENT
Start: 2018-05-09 | End: 2018-05-09

## 2018-05-09 RX ORDER — MIDAZOLAM HYDROCHLORIDE 1 MG/ML
.25-5 INJECTION, SOLUTION INTRAMUSCULAR; INTRAVENOUS
Status: DISCONTINUED | OUTPATIENT
Start: 2018-05-09 | End: 2018-05-09 | Stop reason: HOSPADM

## 2018-05-09 RX ORDER — SODIUM CHLORIDE 9 MG/ML
INJECTION, SOLUTION INTRAVENOUS
Status: DISCONTINUED | OUTPATIENT
Start: 2018-05-09 | End: 2018-05-09 | Stop reason: HOSPADM

## 2018-05-09 RX ORDER — DULOXETIN HYDROCHLORIDE 30 MG/1
120 CAPSULE, DELAYED RELEASE ORAL DAILY
Status: DISCONTINUED | OUTPATIENT
Start: 2018-05-09 | End: 2018-05-11 | Stop reason: HOSPADM

## 2018-05-09 RX ORDER — NALOXONE HYDROCHLORIDE 0.4 MG/ML
0.4 INJECTION, SOLUTION INTRAMUSCULAR; INTRAVENOUS; SUBCUTANEOUS
Status: DISCONTINUED | OUTPATIENT
Start: 2018-05-09 | End: 2018-05-09 | Stop reason: HOSPADM

## 2018-05-09 RX ORDER — PROPOFOL 10 MG/ML
INJECTION, EMULSION INTRAVENOUS AS NEEDED
Status: DISCONTINUED | OUTPATIENT
Start: 2018-05-09 | End: 2018-05-09 | Stop reason: HOSPADM

## 2018-05-09 RX ADMIN — Medication 100 MG: at 08:18

## 2018-05-09 RX ADMIN — PROPOFOL 100 MCG/KG/MIN: 10 INJECTION, EMULSION INTRAVENOUS at 10:55

## 2018-05-09 RX ADMIN — PANTOPRAZOLE SODIUM 40 MG: 40 INJECTION, POWDER, FOR SOLUTION INTRAVENOUS at 21:58

## 2018-05-09 RX ADMIN — LIDOCAINE HYDROCHLORIDE 40 MG: 20 INJECTION, SOLUTION EPIDURAL; INFILTRATION; INTRACAUDAL; PERINEURAL at 10:55

## 2018-05-09 RX ADMIN — FOLIC ACID 1 MG: 1 TABLET ORAL at 08:18

## 2018-05-09 RX ADMIN — EPINEPHRINE 9 MG: 0.1 INJECTION, SOLUTION ENDOTRACHEAL; INTRACARDIAC; INTRAVENOUS at 11:05

## 2018-05-09 RX ADMIN — Medication 10 ML: at 06:00

## 2018-05-09 RX ADMIN — SODIUM CHLORIDE: 9 INJECTION, SOLUTION INTRAVENOUS at 10:30

## 2018-05-09 RX ADMIN — PANTOPRAZOLE SODIUM 40 MG: 40 INJECTION, POWDER, FOR SOLUTION INTRAVENOUS at 08:18

## 2018-05-09 RX ADMIN — Medication 10 ML: at 21:59

## 2018-05-09 RX ADMIN — MORPHINE SULFATE 2 MG: 4 INJECTION, SOLUTION INTRAMUSCULAR; INTRAVENOUS at 12:03

## 2018-05-09 RX ADMIN — PROPOFOL 60 MG: 10 INJECTION, EMULSION INTRAVENOUS at 10:55

## 2018-05-09 RX ADMIN — Medication 1 TABLET: at 08:18

## 2018-05-09 RX ADMIN — PROCHLORPERAZINE EDISYLATE 5 MG: 5 INJECTION INTRAMUSCULAR; INTRAVENOUS at 12:02

## 2018-05-09 RX ADMIN — LORAZEPAM 1 MG: 2 INJECTION INTRAMUSCULAR; INTRAVENOUS at 21:59

## 2018-05-09 NOTE — PROGRESS NOTES
Problem: Falls - Risk of  Goal: *Absence of Falls  Document Travis Fall Risk and appropriate interventions in the flowsheet.    Outcome: Progressing Towards Goal  Fall Risk Interventions:            Medication Interventions: Teach patient to arise slowly, Patient to call before getting OOB

## 2018-05-09 NOTE — PROGRESS NOTES
Bedside and Verbal shift change report given to Stacia Miller and Deep (oncoming nurse) by Rosi Dee (offgoing nurse). Report included the following information SBAR, Kardex, MAR and Recent Results. 0928am: Call placed to MD regarding patient question resuming Kalynta, Dr Georgiana Mcclain states \"that's good\". Order placed. 1130: Report received from Endo. 1150:Pt returns from endo complaining of pain. Tele is notified of patient's return. Pain and nausea medication provided per order.

## 2018-05-09 NOTE — PROCEDURES
Chantale Myrick M.D.  [phone]           2018                EGD Operative Report  Kristen Kim  :  1965  [facility] Medical Record Number:  [id number]      Indication: Melena    : Price Díaz MD    Referring Provider:  Bree Jordan MD      Anesthesia/Sedation:  MAC anesthesia Propofol    Airway assessment: No airway problems anticipated    Pre-Procedural Exam:      Airway: clear, no airway problems anticipated  Heart: RRR, without gallops or rubs  Lungs: clear bilaterally without wheezes, crackles, or rhonchi  Abdomen: soft, nontender, nondistended, bowel sounds present  Mental Status: awake, alert and oriented to person, place and time       Procedure Details     After infomed consent was obtained for the procedure, with all risks and benefits of procedure explained the patient was taken to the endoscopy suite and placed in the left lateral decubitus position. Following sequential administration of sedation as per above, the endoscope was inserted into the mouth and advanced under direct vision to second portion of the duodenum. A careful inspection was made as the gastroscope was withdrawn, including a retroflexed view of the proximal stomach; findings and interventions are described below. Findings:   Esophagus:normal  Stomach: mild erythema was noted in  antrum  Duodenum/jejunum: 1 ulcer(s) 8 mm in size located in  duodenal sweep    Therapies:  injection of 9 cc of epinephrine and heater probe therapy        Specimens: ASIF testing           Complications:   None; patient tolerated the procedure well. EBL:  None. Impression:    An actively bleeding ulcer noted at the duodenal sweep. This was treated with epinephrine and heater probe therapy    Recommendations:    -Continue acid suppression. ,   -Await ASIF test result and treat for Helicobacter pylori if positive. ,   -No NSAIDS,   -Monitor H&H closely and repeat EGD if shows signs of bleeding  -Start clear liquid diet today and advance as tolerated tomorrow.      Ramos Burnette MD

## 2018-05-09 NOTE — ANESTHESIA POSTPROCEDURE EVALUATION
Post-Anesthesia Evaluation and Assessment    Patient: Hyland Gitelman MRN: [id number]  SSN: [id number]    YOB: 1965  Age: 45 y.o. Sex: female       Cardiovascular Function/Vital Signs  Visit Vitals    BP 98/79 (BP 1 Location: Right arm, BP Patient Position: At rest)    Pulse 95    Temp 36.5 °C (97.7 °F)    Resp 18    Ht 4' 10\" (1.473 m)    Wt 49.5 kg (109 lb 2 oz)    SpO2 99%    BMI 22.81 kg/m2       Patient is status post MAC anesthesia for Procedure(s):  ESOPHAGOGASTRODUODENOSCOPY (EGD)  ESOPHAGOGASTRODUODENAL (EGD) BIOPSY  BICAP  INJECTION. Nausea/Vomiting: None    Postoperative hydration reviewed and adequate. Pain:  Pain Scale 1: Numeric (0 - 10) (05/09/18 1243)  Pain Intensity 1: 5 (05/09/18 1243)   Managed    Neurological Status: At baseline    Mental Status and Level of Consciousness: Arousable    Pulmonary Status:   O2 Device: Room air (05/09/18 1142)   Adequate oxygenation and airway patent    Complications related to anesthesia: None    Post-anesthesia assessment completed.  No concerns    Signed By: Sanchez Barrera MD     May 9, 2018

## 2018-05-09 NOTE — PERIOP NOTES
Procedure being performed under MAC; Leopoldo Dubois, CRNA at bedside monitoring patient at 80. See anesthesia notes. Endoscope was pre-cleaned at bedside immediately following procedure by Catherine Henriquez at 1106. Care of patient assumed from the anesthesia provider at 1120. Patient tolerated procedure well. Abdomen remains soft and non tender post procedure, no complaints or indication of discomfort noted at this time. See anesthesia note. Patient transferred to Endoscopy Recovery and report given to recovery nurse [facility] RN. TRANSFER - OUT REPORT:    Verbal report given to Shalini(name) on Wilhelmena Feast  being transferred to [facility](unit) for routine progression of care       Report consisted of patients Situation, Background, Assessment and   Recommendations(SBAR). Information from the following report(s) SBAR, Procedure Summary and Recent Results was reviewed with the receiving nurse. Lines:   Peripheral IV 05/08/18 Right Antecubital (Active)   Site Assessment Clean, dry, & intact 5/9/2018 10:19 AM   Phlebitis Assessment 0 5/9/2018 10:19 AM   Infiltration Assessment 0 5/9/2018 10:19 AM   Dressing Status Clean, dry, & intact 5/9/2018 10:19 AM   Dressing Type Transparent;Tape 5/9/2018 10:19 AM   Hub Color/Line Status Pink; Infusing;Patent 5/9/2018 10:19 AM   Action Taken Open ports on tubing capped 5/9/2018 10:19 AM   Alcohol Cap Used Yes 5/9/2018 10:19 AM        Opportunity for questions and clarification was provided.

## 2018-05-09 NOTE — ANESTHESIA PREPROCEDURE EVALUATION
Anesthetic History   No history of anesthetic complications            Review of Systems / Medical History  Patient summary reviewed, nursing notes reviewed and pertinent labs reviewed    Pulmonary            Asthma        Neuro/Psych     seizures    Psychiatric history    Comments: Seizure-has had multiple, but none in 10 years and not on medication to prevent it) Cardiovascular  Within defined limits                Exercise tolerance: >4 METS  Comments: Not on beta blocker   GI/Hepatic/Renal         Renal disease: stones  PUD     Endo/Other      Hypothyroidism  Anemia     Other Findings   Comments: Gi bleed  etoh abuse         Physical Exam    Airway  Mallampati: II  TM Distance: < 4 cm  Neck ROM: normal range of motion   Mouth opening: Normal     Cardiovascular  Regular rate and rhythm,  S1 and S2 normal,  no murmur, click, rub, or gallop  Rhythm: regular  Rate: normal         Dental    Dentition: Poor dentition  Comments: Very poor dentition including the top front   Pulmonary  Breath sounds clear to auscultation               Abdominal  GI exam deferred       Other Findings            Anesthetic Plan    ASA: 2  Anesthesia type: MAC            Anesthetic plan and risks discussed with: Patient

## 2018-05-09 NOTE — PROGRESS NOTES
Bedside and Verbal shift change report given to Marino Motcezuma  (oncoming nurse) by Radha Pfeiffer RN (offgoing nurse). Report included the following information SBAR, Kardex, Procedure Summary, Intake/Output, MAR and Recent Results.

## 2018-05-09 NOTE — PROGRESS NOTES
Bedside and Verbal shift change report given to Theron (oncoming nurse) by Elvi Mendez (offgoing nurse). Report included the following information SBAR, Kardex, ED Summary, Procedure Summary, Intake/Output, MAR, Recent Results and Cardiac Rhythm nsr, sinus tach. Pt has been NPO since midnight, per Dr Micah ramirez with ice chips. Endo scheduled 1230, will send for pt around 1100    0954 pt off floor to endo, tele notified    [phone] pt back on floor, tele notified    [phone] pt c/o pain 10/10 epiogastric since procedure. Pt stated that she stated pain level in endo & was told that she would be treated upon return to the room. Pt states that she vomited after procedure, was not treated.  PRN morphine and compazine given for c/o pain and nausea

## 2018-05-09 NOTE — PERIOP NOTES
Chart accessed for endoscopy charge nurse review and to obtained report prior to EGD. Jalyn Duganwater  1965  [id number]    Situation:    Scheduled Procedure: Procedure(s):  ESOPHAGOGASTRODUODENOSCOPY (EGD)  Verbal report received from: STEVEN Park  Preoperative diagnosis: anemia    Background:    Procedure: Procedure(s):  ESOPHAGOGASTRODUODENOSCOPY (EGD)  Physician performing procedure; Dr. Iain Lilly Status/Last PO Intake: midnight (except PO meds and a few ice chips)    Pregnancy Test:no If yes, result: none    Is the patient taking Blood Thinners: NO If yes, list:  and last taken   Is the patient diabetic:no           Does the patient have a Pacemaker/Defibrillator in place?: no   Does the patient need antibiotics before/during/after procedure: no      What were the Colon prep results? Does the patient have SCD in place:no   Is patient on CONTACT precautions:no        If yes, what kind of CONTACT precautions:     Assessment:  Are the vital signs stable prior to patient coming to ENDO?  yes  Is the patient alert/oriented and able to sign consent for the procedures:yes    Does the patient have a patient IV in place? yes     Recommendation:  Family or Friend present no     Permission to share finding with Family or Friend n/a    EGD scheduled for 200 today.

## 2018-05-09 NOTE — PROGRESS NOTES
Reason for Admission:   GI bleed, Anemia                   RRAT Score:   10                  Plan for utilizing home health:   Not opposed if recommended                       Likelihood of Readmission:  Low/green                         Transition of Care Plan:      I met with the patient and her friend Rox Vázquez cell is [phone]. Maurice Ríos and his mother Lisa Iyer are the pt's main contacts. Pt lives alone in a two story house with 2 steps to enter the home. She has 11 steps to her bedroom. Pt has prescription coverage under her insurance plan. She gets her prescriptions filled at [facility]. PCP is Cordell Jett. Pt has never had home health before and does not have any DME. No other issues or concerns at this time. Thanks Jaleel Martell MSW  Care Management Interventions  PCP Verified by CM:  Yes  Nit Signup: No  Discharge Durable Medical Equipment: No  Physical Therapy Consult: No  Occupational Therapy Consult: No  Speech Therapy Consult: No  Current Support Network: Lives Alone  Confirm Follow Up Transport: Family  Plan discussed with Pt/Family/Caregiver: Yes  Freedom of Choice Offered: Yes  Discharge Location  Discharge Placement: Home

## 2018-05-09 NOTE — PROGRESS NOTES
Daily Progress Note: 5/9/2018  Dustin Mayorga MD    Assessment/Plan:   Acute GI bleeding (5/8/2018)/ Hx Gastric ulcer (5/8/2018): presumed upper GI bleed. Trigger seems to be BC powder and ethanol use.    ---Start IV pantoprazole. ---Keep NPO.   ---EGD today  ---Follow CT abdomen     Acute blood loss anemia (3/4/2017): due to GI bleed. ---Transfused 2 units PRBCs. ---Monitor Hgb.      Alcohol abuse (6/3/2015): counseled on cessation. ---Risk for withdrawal seems low CIWA protocol for now     SIRS (systemic inflammatory response syndrome) ([facility] (5/8/2018): likely from the GI bleed. ---Monitor      Depression (6/3/2015): resume Cymbalta     Hypothyroidism (6/3/2015): not taking any medications and prior TSH was normal.  ---Recheck TSH      Psoriasis POA: hold humira       Problem List:  Problem List as of 5/9/2018  Date Reviewed: 3/5/2017          Codes Class Noted - Resolved    * (Principal)Acute GI bleeding ICD-10-CM: K92.2  ICD-9-CM: 578.9  5/8/2018 - Present        Gastric ulcer ICD-10-CM: K25.9  ICD-9-CM: 531.90  5/8/2018 - Present        SIRS (systemic inflammatory response syndrome) ([facility] ICD-10-CM: R65.10  ICD-9-CM: 995.90  5/8/2018 - Present        GI bleed ICD-10-CM: K92.2  ICD-9-CM: 578.9  3/4/2017 - Present        Acute blood loss anemia ICD-10-CM: D62  ICD-9-CM: 285.1  3/4/2017 - Present        Hypothyroidism (Chronic) ICD-10-CM: E03.9  ICD-9-CM: 244.9  6/3/2015 - Present        Depression (Chronic) ICD-10-CM: F32.9  ICD-9-CM: 885  6/3/2015 - Present        Microcytic anemia (Chronic) ICD-10-CM: D50.9  ICD-9-CM: 280.9  6/3/2015 - Present        Alcohol abuse (Chronic) ICD-10-CM: F10.10  ICD-9-CM: 305.00  6/3/2015 - Present        RESOLVED: SOB (shortness of breath) ICD-10-CM: R06.02  ICD-9-CM: 786.05  6/3/2015 - 3/4/2017              HPI:   Ms. Holcomb is a 45 y.o. female who is being admitted for acute GI bleeding.  Ms. Holcomb presented to our Emergency Department today complaining of melena stools for the past 2 days. Associated with a moderate aching upper abdominal discomfort, exertional SOB and lightheadedness. She denies any nausea, vomiting or hematemesis. She has been taking BC powder for sinus disease for the past 1-2 weeks as well as drinking alcohol which she says is intermittent. She was found to be anemic with a Hgb of 6.7. Her prior EGD had shown gastric ulcers. She will be admitted for further management. (Dr Becky Ramirez)    : Is due for EGD today. No further melena. Hb up to 8.2. She has been fatigued and would like to have her thyroid checked. Review of Systems:   A comprehensive review of systems was negative except for that written in the HPI. Objective:   Physical Exam:     Visit Vitals    /65 (BP 1 Location: Right arm, BP Patient Position: Sitting)    Pulse 94    Temp 98.5 °F (36.9 °C)    Resp 17    Ht 4' 10\" (1.473 m)    Wt 109 lb 2 oz (49.5 kg)    SpO2 96%    BMI 22.81 kg/m2      O2 Device: Room air    Temp (24hrs), Av.4 °F (36.9 °C), Min:97.9 °F (36.6 °C), Max:98.9 °F (37.2 °C)    701 -  1900  In: -   Out: 550 [Urine:550]   1901 -  0700  In: 738.7   Out: 450 [Urine:450]    General:  Alert, cooperative, no distress, appears stated age. Head:  Normocephalic, without obvious abnormality, atraumatic. Eyes:  Conjunctivae/corneas clear. PERRL, EOMs intact. Nose: Nares normal. Septum midline. Mucosa normal. No drainage or sinus tenderness. Throat: Lips, mucosa, and tongue normal. Teeth and gums normal.   Neck: Supple, symmetrical, trachea midline, no adenopathy, thyroid: no enlargement/tenderness/nodules, no carotid bruit and no JVD. Back:   Symmetric, no curvature. ROM normal. No CVA tenderness. Lungs:   Clear to auscultation bilaterally. Chest wall:  No tenderness or deformity. Heart:  Regular rate and rhythm, S1, S2 normal, no murmur, click, rub or gallop. Abdomen:   Soft, non-tender.  Bowel sounds normal. No masses,  No organomegaly. Extremities: Extremities normal, atraumatic, no cyanosis or edema. No calf tenderness or cords. Pulses: 2+ and symmetric all extremities. Skin: Skin color, texture, turgor normal. No rashes or lesions   Neurologic: CNII-XII intact. Alert and oriented X 3. Fine motor of hands and fingers normal.   equal.  No cogwheeling or rigidity. Gait not tested at this time. Sensation grossly normal to touch. Gross motor of extremities normal.       Data Review:       Recent Days:  Recent Labs      05/09/18 0051 05/08/18   1300   WBC  7.6  12.1*   HGB  8.2*  6.7*   HCT  24.7*  20.1*   PLT  137*  251     Recent Labs      05/09/18 0051 05/08/18   1300   NA  137  137   K  3.8  3.8   CL  107  102   CO2  21  21   GLU  87  131*   BUN  17  23*   CREA  0.62  0.77   CA  7.9*  8.6   MG  1.9   --    PHOS  3.0   --    ALB  2.9*  3.4*   TBILI  1.4*  0.3   SGOT  34  31   ALT  20  31     No results for input(s): PH, PCO2, PO2, HCO3, FIO2 in the last 72 hours.     24 Hour Results:  Recent Results (from the past 24 hour(s))   EKG, 12 LEAD, INITIAL    Collection Time: 05/08/18 12:50 PM   Result Value Ref Range    Ventricular Rate 127 BPM    Atrial Rate 127 BPM    P-R Interval 138 ms    QRS Duration 66 ms    Q-T Interval 310 ms    QTC Calculation (Bezet) 450 ms    Calculated P Axis 74 degrees    Calculated R Axis 15 degrees    Calculated T Axis 33 degrees    Diagnosis       Sinus tachycardia  Nonspecific ST and T wave abnormality  Abnormal ECG  When compared with ECG of 04-MAR-2017 21:21,  No significant change was found  Confirmed by Tina Alatorre MD., Kati ([id number]) on 5/8/2018 10:52:27 PM     CBC WITH AUTOMATED DIFF    Collection Time: 05/08/18  1:00 PM   Result Value Ref Range    WBC 12.1 (H) 3.6 - 11.0 K/uL    RBC 2.02 (L) 3.80 - 5.20 M/uL    HGB 6.7 (L) 11.5 - 16.0 g/dL    HCT 20.1 (L) 35.0 - 47.0 %    MCV 99.5 (H) 80.0 - 99.0 FL    MCH 33.2 26.0 - 34.0 PG    MCHC 33.3 30.0 - 36.5 g/dL    RDW 12.1 11.5 - 14.5 %    PLATELET 136 963 - 063 K/uL    MPV 10.1 8.9 - 12.9 FL    NRBC 0.0 0  WBC    ABSOLUTE NRBC 0.00 0.00 - 0.01 K/uL    NEUTROPHILS 70 32 - 75 %    LYMPHOCYTES 22 12 - 49 %    MONOCYTES 7 5 - 13 %    EOSINOPHILS 1 0 - 7 %    BASOPHILS 0 0 - 1 %    IMMATURE GRANULOCYTES 0 0.0 - 0.5 %    ABS. NEUTROPHILS 8.5 (H) 1.8 - 8.0 K/UL    ABS. LYMPHOCYTES 2.7 0.8 - 3.5 K/UL    ABS. MONOCYTES 0.8 0.0 - 1.0 K/UL    ABS. EOSINOPHILS 0.1 0.0 - 0.4 K/UL    ABS. BASOPHILS 0.0 0.0 - 0.1 K/UL    ABS. IMM. GRANS. 0.0 0.00 - 0.04 K/UL    DF AUTOMATED      RBC COMMENTS NORMOCYTIC, NORMOCHROMIC     LIPASE    Collection Time: 05/08/18  1:00 PM   Result Value Ref Range    Lipase 122 73 - 830 U/L   METABOLIC PANEL, COMPREHENSIVE    Collection Time: 05/08/18  1:00 PM   Result Value Ref Range    Sodium 137 136 - 145 mmol/L    Potassium 3.8 3.5 - 5.1 mmol/L    Chloride 102 97 - 108 mmol/L    CO2 21 21 - 32 mmol/L    Anion gap 14 5 - 15 mmol/L    Glucose 131 (H) 65 - 100 mg/dL    BUN 23 (H) 6 - 20 MG/DL    Creatinine 0.77 0.55 - 1.02 MG/DL    BUN/Creatinine ratio 30 (H) 12 - 20      GFR est AA >60 >60 ml/min/1.73m2    GFR est non-AA >60 >60 ml/min/1.73m2    Calcium 8.6 8.5 - 10.1 MG/DL    Bilirubin, total 0.3 0.2 - 1.0 MG/DL    ALT (SGPT) 31 12 - 78 U/L    AST (SGOT) 31 15 - 37 U/L    Alk.  phosphatase 93 45 - 117 U/L    Protein, total 6.8 6.4 - 8.2 g/dL    Albumin 3.4 (L) 3.5 - 5.0 g/dL    Globulin 3.4 2.0 - 4.0 g/dL    A-G Ratio 1.0 (L) 1.1 - 2.2     OCCULT BLOOD, STOOL    Collection Time: 05/08/18  1:00 PM   Result Value Ref Range    Occult blood, stool POSITIVE (A) NEG     TYPE & SCREEN    Collection Time: 05/08/18  1:00 PM   Result Value Ref Range    Crossmatch Expiration 05/11/2018     ABO/Rh(D) O POSITIVE     Antibody screen NEG     Unit number [id number]     Blood component type  LR     Unit division 00     Status of unit ISSUED     Crossmatch result Compatible     Unit number [id number]     Blood component type RC LR     Unit division 00     Status of unit ISSUED     Crossmatch result Compatible    METABOLIC PANEL, COMPREHENSIVE    Collection Time: 05/09/18 12:51 AM   Result Value Ref Range    Sodium 137 136 - 145 mmol/L    Potassium 3.8 3.5 - 5.1 mmol/L    Chloride 107 97 - 108 mmol/L    CO2 21 21 - 32 mmol/L    Anion gap 9 5 - 15 mmol/L    Glucose 87 65 - 100 mg/dL    BUN 17 6 - 20 MG/DL    Creatinine 0.62 0.55 - 1.02 MG/DL    BUN/Creatinine ratio 27 (H) 12 - 20      GFR est AA >60 >60 ml/min/1.73m2    GFR est non-AA >60 >60 ml/min/1.73m2    Calcium 7.9 (L) 8.5 - 10.1 MG/DL    Bilirubin, total 1.4 (H) 0.2 - 1.0 MG/DL    ALT (SGPT) 20 12 - 78 U/L    AST (SGOT) 34 15 - 37 U/L    Alk. phosphatase 69 45 - 117 U/L    Protein, total 5.5 (L) 6.4 - 8.2 g/dL    Albumin 2.9 (L) 3.5 - 5.0 g/dL    Globulin 2.6 2.0 - 4.0 g/dL    A-G Ratio 1.1 1.1 - 2.2     CBC WITH AUTOMATED DIFF    Collection Time: 05/09/18 12:51 AM   Result Value Ref Range    WBC 7.6 3.6 - 11.0 K/uL    RBC 2.69 (L) 3.80 - 5.20 M/uL    HGB 8.2 (L) 11.5 - 16.0 g/dL    HCT 24.7 (L) 35.0 - 47.0 %    MCV 91.8 80.0 - 99.0 FL    MCH 30.5 26.0 - 34.0 PG    MCHC 33.2 30.0 - 36.5 g/dL    RDW 17.1 (H) 11.5 - 14.5 %    PLATELET 602 (L) 169 - 400 K/uL    MPV 10.1 8.9 - 12.9 FL    NRBC 0.0 0  WBC    ABSOLUTE NRBC 0.00 0.00 - 0.01 K/uL    NEUTROPHILS 54 32 - 75 %    LYMPHOCYTES 34 12 - 49 %    MONOCYTES 11 5 - 13 %    EOSINOPHILS 1 0 - 7 %    BASOPHILS 0 0 - 1 %    IMMATURE GRANULOCYTES 0 0.0 - 0.5 %    ABS. NEUTROPHILS 4.1 1.8 - 8.0 K/UL    ABS. LYMPHOCYTES 2.6 0.8 - 3.5 K/UL    ABS. MONOCYTES 0.8 0.0 - 1.0 K/UL    ABS. EOSINOPHILS 0.1 0.0 - 0.4 K/UL    ABS. BASOPHILS 0.0 0.0 - 0.1 K/UL    ABS. IMM.  GRANS. 0.0 0.00 - 0.04 K/UL    DF AUTOMATED     PHOSPHORUS    Collection Time: 05/09/18 12:51 AM   Result Value Ref Range    Phosphorus 3.0 2.6 - 4.7 MG/DL   MAGNESIUM    Collection Time: 05/09/18 12:51 AM   Result Value Ref Range    Magnesium 1.9 1.6 - 2.4 mg/dL Medications reviewed  Current Facility-Administered Medications   Medication Dose Route Frequency    0.9% sodium chloride infusion 250 mL  250 mL IntraVENous PRN    sodium chloride (NS) flush 5-10 mL  5-10 mL IntraVENous Q8H    sodium chloride (NS) flush 5-10 mL  5-10 mL IntraVENous PRN    acetaminophen (TYLENOL) tablet 650 mg  650 mg Oral Q4H PRN    morphine injection 2 mg  2 mg IntraVENous Q4H PRN    naloxone (NARCAN) injection 0.4 mg  0.4 mg IntraVENous PRN    diphenhydrAMINE (BENADRYL) injection 12.5 mg  12.5 mg IntraVENous Q4H PRN    prochlorperazine (COMPAZINE) injection 5 mg  5 mg IntraVENous Q8H PRN    pantoprazole (PROTONIX) 40 mg in sodium chloride 0.9% 10 mL injection  40 mg IntraVENous Q12H    LORazepam (ATIVAN) injection 2 mg  2 mg IntraVENous Q1H PRN    LORazepam (ATIVAN) injection 4 mg  4 mg IntraVENous T5L PRN    folic acid (FOLVITE) tablet 1 mg  1 mg Oral DAILY    Thiamine Mononitrate (B-1) tablet 100 mg  100 mg Oral DAILY    multivitamin with iron tablet 1 Tab  1 Tab Oral DAILY       Care Plan discussed with: Patient/Family and Nurse    Total time spent with patient and review of records: 30 minutes.     Lake Sinha MD

## 2018-05-10 LAB
ALBUMIN SERPL-MCNC: 3.1 G/DL (ref 3.5–5)
ALBUMIN/GLOB SERPL: 1.1 {RATIO} (ref 1.1–2.2)
ALP SERPL-CCNC: 69 U/L (ref 45–117)
ALT SERPL-CCNC: 29 U/L (ref 12–78)
ANION GAP SERPL CALC-SCNC: 9 MMOL/L (ref 5–15)
AST SERPL-CCNC: 33 U/L (ref 15–37)
BASOPHILS # BLD: 0 K/UL (ref 0–0.1)
BASOPHILS NFR BLD: 1 % (ref 0–1)
BILIRUB SERPL-MCNC: 0.4 MG/DL (ref 0.2–1)
BUN SERPL-MCNC: 7 MG/DL (ref 6–20)
BUN/CREAT SERPL: 11 (ref 12–20)
CALCIUM SERPL-MCNC: 8.1 MG/DL (ref 8.5–10.1)
CHLORIDE SERPL-SCNC: 107 MMOL/L (ref 97–108)
CO2 SERPL-SCNC: 24 MMOL/L (ref 21–32)
CREAT SERPL-MCNC: 0.61 MG/DL (ref 0.55–1.02)
DIFFERENTIAL METHOD BLD: ABNORMAL
EOSINOPHIL # BLD: 0.2 K/UL (ref 0–0.4)
EOSINOPHIL NFR BLD: 3 % (ref 0–7)
ERYTHROCYTE [DISTWIDTH] IN BLOOD BY AUTOMATED COUNT: 17.5 % (ref 11.5–14.5)
GLOBULIN SER CALC-MCNC: 2.9 G/DL (ref 2–4)
GLUCOSE SERPL-MCNC: 97 MG/DL (ref 65–100)
HCT VFR BLD AUTO: 24.5 % (ref 35–47)
HGB BLD-MCNC: 8 G/DL (ref 11.5–16)
IMM GRANULOCYTES # BLD: 0 K/UL (ref 0–0.04)
IMM GRANULOCYTES NFR BLD AUTO: 0 % (ref 0–0.5)
LYMPHOCYTES # BLD: 2.3 K/UL (ref 0.8–3.5)
LYMPHOCYTES NFR BLD: 36 % (ref 12–49)
MAGNESIUM SERPL-MCNC: 1.9 MG/DL (ref 1.6–2.4)
MCH RBC QN AUTO: 30.3 PG (ref 26–34)
MCHC RBC AUTO-ENTMCNC: 32.7 G/DL (ref 30–36.5)
MCV RBC AUTO: 92.8 FL (ref 80–99)
MONOCYTES # BLD: 0.7 K/UL (ref 0–1)
MONOCYTES NFR BLD: 11 % (ref 5–13)
NEUTS SEG # BLD: 3.2 K/UL (ref 1.8–8)
NEUTS SEG NFR BLD: 50 % (ref 32–75)
NRBC # BLD: 0 K/UL (ref 0–0.01)
NRBC BLD-RTO: 0 PER 100 WBC
PLATELET # BLD AUTO: 160 K/UL (ref 150–400)
PMV BLD AUTO: 9.9 FL (ref 8.9–12.9)
POTASSIUM SERPL-SCNC: 2.8 MMOL/L (ref 3.5–5.1)
PROT SERPL-MCNC: 6 G/DL (ref 6.4–8.2)
RBC # BLD AUTO: 2.64 M/UL (ref 3.8–5.2)
SODIUM SERPL-SCNC: 140 MMOL/L (ref 136–145)
WBC # BLD AUTO: 6.4 K/UL (ref 3.6–11)

## 2018-05-10 PROCEDURE — 74011250636 HC RX REV CODE- 250/636: Performed by: INTERNAL MEDICINE

## 2018-05-10 PROCEDURE — 74011250636 HC RX REV CODE- 250/636: Performed by: FAMILY MEDICINE

## 2018-05-10 PROCEDURE — 74011250637 HC RX REV CODE- 250/637: Performed by: FAMILY MEDICINE

## 2018-05-10 PROCEDURE — C9113 INJ PANTOPRAZOLE SODIUM, VIA: HCPCS | Performed by: INTERNAL MEDICINE

## 2018-05-10 PROCEDURE — 85025 COMPLETE CBC W/AUTO DIFF WBC: CPT | Performed by: FAMILY MEDICINE

## 2018-05-10 PROCEDURE — 83735 ASSAY OF MAGNESIUM: CPT | Performed by: FAMILY MEDICINE

## 2018-05-10 PROCEDURE — 80053 COMPREHEN METABOLIC PANEL: CPT | Performed by: FAMILY MEDICINE

## 2018-05-10 PROCEDURE — 36415 COLL VENOUS BLD VENIPUNCTURE: CPT | Performed by: FAMILY MEDICINE

## 2018-05-10 PROCEDURE — 65660000000 HC RM CCU STEPDOWN

## 2018-05-10 PROCEDURE — 74011250637 HC RX REV CODE- 250/637: Performed by: INTERNAL MEDICINE

## 2018-05-10 RX ORDER — LEVOTHYROXINE SODIUM 50 UG/1
50 TABLET ORAL
Status: DISCONTINUED | OUTPATIENT
Start: 2018-05-10 | End: 2018-05-11 | Stop reason: HOSPADM

## 2018-05-10 RX ORDER — POTASSIUM CHLORIDE 7.45 MG/ML
10 INJECTION INTRAVENOUS
Status: COMPLETED | OUTPATIENT
Start: 2018-05-10 | End: 2018-05-10

## 2018-05-10 RX ADMIN — Medication 10 ML: at 06:44

## 2018-05-10 RX ADMIN — LEVOTHYROXINE SODIUM 50 MCG: 50 TABLET ORAL at 07:39

## 2018-05-10 RX ADMIN — FOLIC ACID 1 MG: 1 TABLET ORAL at 08:10

## 2018-05-10 RX ADMIN — POTASSIUM CHLORIDE 10 MEQ: 10 INJECTION, SOLUTION INTRAVENOUS at 10:45

## 2018-05-10 RX ADMIN — POTASSIUM CHLORIDE 10 MEQ: 10 INJECTION, SOLUTION INTRAVENOUS at 09:15

## 2018-05-10 RX ADMIN — Medication 1 TABLET: at 08:10

## 2018-05-10 RX ADMIN — POTASSIUM CHLORIDE 10 MEQ: 10 INJECTION, SOLUTION INTRAVENOUS at 12:29

## 2018-05-10 RX ADMIN — Medication 10 ML: at 21:53

## 2018-05-10 RX ADMIN — LORAZEPAM 1 MG: 2 INJECTION INTRAMUSCULAR; INTRAVENOUS at 21:52

## 2018-05-10 RX ADMIN — POTASSIUM CHLORIDE 10 MEQ: 10 INJECTION, SOLUTION INTRAVENOUS at 07:39

## 2018-05-10 RX ADMIN — ACETAMINOPHEN 650 MG: 325 TABLET ORAL at 20:43

## 2018-05-10 RX ADMIN — DULOXETINE HYDROCHLORIDE 120 MG: 30 CAPSULE, DELAYED RELEASE ORAL at 08:10

## 2018-05-10 RX ADMIN — Medication 100 MG: at 09:00

## 2018-05-10 RX ADMIN — ACETAMINOPHEN 650 MG: 325 TABLET ORAL at 14:33

## 2018-05-10 RX ADMIN — PANTOPRAZOLE SODIUM 40 MG: 40 INJECTION, POWDER, FOR SOLUTION INTRAVENOUS at 08:47

## 2018-05-10 RX ADMIN — PANTOPRAZOLE SODIUM 40 MG: 40 INJECTION, POWDER, FOR SOLUTION INTRAVENOUS at 21:53

## 2018-05-10 NOTE — PROGRESS NOTES
5- CASE MANAGEMENT NOTE:  I met with the pt to introduce myself and explain the role of case management. She confirmed that she lives alone in a [facility] [place], operated by [facility]. She is independent with her ADL's, has no assistive devices and drives. She is on disability. Her PCP is Dr. Nica Mosqueda. She has prescription drug coverage and gets her medications from [facility] on [address]. She expressed concern that this hospital bill may not be covered in full and I requested [facility] meet with her to discuss eligibility for the Care Card. She did not identify any further discharge needs.  TYREL TarangoW, CM

## 2018-05-10 NOTE — PROGRESS NOTES
Bedside and Verbal shift change report given to Miguel Ángel Lugo RN (oncoming nurse) by Yevgeniy Song RN and Brain Antonio RN  (offgoing nurse). Report included the following information SBAR, Kardex, ED Summary, Procedure Summary, Intake/Output, Recent Results, Med Rec Status and Cardiac Rhythm NSR/ST.    2120: Pt alert, oriented, up in chair; stable on feet, denies dizziness. CIWA score 2. Pt reports unable to sleep past two nights. Notified Dr. Wes Bush of pt status. Dr. Zac Azar 1 mg Ativan IV at bedtime prn for insomnia. Bedside and Verbal shift change report given to Yevgeniy Song Rn (oncoming nurse) by Miguel Ángel Lugo RN (offgoing nurse). Report included the following information SBAR, Kardex, ED Summary, Procedure Summary, Intake/Output, Accordion, Recent Results, Med Rec Status and Cardiac Rhythm NSR.

## 2018-05-10 NOTE — PROGRESS NOTES
Bedside and Verbal shift change report given to Derrek Jackson (oncoming nurse) by Ho Roberts RN (offgoing nurse). Report included the following information SBAR, Kardex, Procedure Summary, Intake/Output, MAR and Recent Results.

## 2018-05-10 NOTE — PROGRESS NOTES
[address] NP  [phone]           GI PROGRESS NOTE        NAME: Dahlia Welsh   :  1965   MRN:  [id number]       Subjective:   Reports that her abdominal pain is better. She had one episode of vomiting pink contents after procedure. Also had one red / dark stool overnight. Objective:       VITALS:   Last 24hrs VS reviewed since prior progress note. Most recent are:  Visit Vitals    /78 (BP 1 Location: Right arm, BP Patient Position: Sitting)    Pulse 90    Temp 98 °F (36.7 °C)    Resp 16    Ht 4' 10\" (1.473 m)    Wt 49.5 kg (109 lb 2 oz)    SpO2 100%    BMI 22.81 kg/m2       Intake/Output Summary (Last 24 hours) at 05/10/18 1009  Last data filed at 05/10/18 0418   Gross per 24 hour   Intake              480 ml   Output             2200 ml   Net            -1720 ml       PHYSICAL EXAM:  General: Alert, in no acute distress    HEENT: Anicteric sclerae. Lungs:            CTA Bilaterally. Heart:  Regular  rhythm,    Abdomen: Soft, Non distended, Non tender.  (+)Bowel sounds, no HSM  Extremities: No c/c/e  Neurologic:  CN 2-12 gi, Alert and oriented X 3. No acute neurological distress   Psych:   Good insight. Not anxious nor agitated.     Lab Data Reviewed:   Recent Labs      05/10/18   0055  05/09/18   0051   WBC  6.4  7.6   HGB  8.0*  8.2*   HCT  24.5*  24.7*   PLT  160  137*     Recent Labs      05/10/18   0055  05/09/18   005   NA  140  137   K  2.8*  3.8   CL  107  107   CO2  24  21   BUN  7  17   CREA  0.61  0.62   GLU  97  87   PHOS   --   3.0   CA  8.1*  7.9*     Recent Labs      05/10/18   0055  18   0051  18   1300   SGOT  33  34  31   AP  69  69  93   TP  6.0*  5.5*  6.8   ALB  3.1*  2.9*  3.4*   GLOB  2.9  2.6  3.4   LPSE   --    --   122       ________________________________________________________________________  Patient Active Problem List   Diagnosis Code    Hypothyroidism E03.9    Depression F32.9    Microcytic anemia D50.9    Alcohol abuse F10.10    GI bleed K92.2    Acute blood loss anemia D62    Acute GI bleeding K92.2    Gastric ulcer K25.9    SIRS (systemic inflammatory response syndrome) (HCC) R65.10         Assessment and Plan:  Upper GI Bleed:  Status post EGD yesterday which showed an actively bleeding ulcer noted at the duodenal sweep. This was treated with epinephrine and heater probe therapy. HgB 8.0 today. - Diet as tolerated. - Continue PPI. - Continue to trend HgB transfuse to goal of 7.  - No NSAIDs    If she continues to do well she may be able to be discharged tomorrow.          Signed By: Judie Hooks NP     5/10/2018  10:09 AM

## 2018-05-10 NOTE — PROGRESS NOTES
Bedside and Verbal shift change report given to Hal Lewis (oncoming nurse) by Aj Keller RN (offgoing nurse). Report included the following information SBAR, Kardex, Procedure Summary, Intake/Output, MAR and Recent Results. Please replace lab orders.

## 2018-05-10 NOTE — PROGRESS NOTES
Daily Progress Note: 5/10/2018  Jorge Alberto Nguyễn MD    Assessment/Plan:   Acute GI bleeding (5/8/2018)/ Hx Gastric ulcer (5/8/2018): presumed upper GI bleed. Trigger seems to be BC powder and ethanol use.    ---Start IV pantoprazole. ---Advance diet to GI lite  ---EGD 5/9- Duodenal ulcer  ---Follow CT abdomen     Acute blood loss anemia (3/4/2017): due to GI bleed. ---Transfused 2 units PRBCs. ---Monitor Hgb.      Alcohol abuse (6/3/2015): counseled on cessation. ---Risk for withdrawal seems low [facility] protocol for now     SIRS (systemic inflammatory response syndrome) ([facility] (5/8/2018): likely from the GI bleed. ---Monitor      Depression (6/3/2015): resume Cymbalta     Hypothyroidism (6/3/2015): not taking any medications and prior TSH was normal.  ---Recheck TSH which is up  ---Start Synthroid 50 mcg daily    Hypokalemia  ---Replete  ---Check Mag     Psoriasis POA: hold humira       Problem List:  Problem List as of 5/10/2018  Date Reviewed: 3/5/2017          Codes Class Noted - Resolved    * (Principal)Acute GI bleeding ICD-10-CM: K92.2  ICD-9-CM: 578.9  5/8/2018 - Present        Gastric ulcer ICD-10-CM: K25.9  ICD-9-CM: 531.90  5/8/2018 - Present        SIRS (systemic inflammatory response syndrome) ([facility] ICD-10-CM: R65.10  ICD-9-CM: 995.90  5/8/2018 - Present        GI bleed ICD-10-CM: K92.2  ICD-9-CM: 578.9  3/4/2017 - Present        Acute blood loss anemia ICD-10-CM: D62  ICD-9-CM: 285.1  3/4/2017 - Present        Hypothyroidism (Chronic) ICD-10-CM: E03.9  ICD-9-CM: 244.9  6/3/2015 - Present        Depression (Chronic) ICD-10-CM: F32.9  ICD-9-CM: 423  6/3/2015 - Present        Microcytic anemia (Chronic) ICD-10-CM: D50.9  ICD-9-CM: 280.9  6/3/2015 - Present        Alcohol abuse (Chronic) ICD-10-CM: F10.10  ICD-9-CM: 305.00  6/3/2015 - Present        RESOLVED: SOB (shortness of breath) ICD-10-CM: R06.02  ICD-9-CM: 786.05  6/3/2015 - 3/4/2017              HPI:   Ms. Reuben Hancock is a 45 y. o. female who is being admitted for acute GI bleeding. Ms. Tj Diaz presented to our Emergency Department today complaining of melena stools for the past 2 days. Associated with a moderate aching upper abdominal discomfort, exertional SOB and lightheadedness. She denies any nausea, vomiting or hematemesis. She has been taking BC powder for sinus disease for the past 1-2 weeks as well as drinking alcohol which she says is intermittent. She was found to be anemic with a Hgb of 6.7. Her prior EGD had shown gastric ulcers. She will be admitted for further management. (Dr Emy Leavitt)    : Is due for EGD today. No further melena. Hb up to 8.2. She has been fatigued and would like to have her thyroid checked. 5/10: EGD showed duodenal ulcer- cauterized. She is tolerating liquids well so will advance to GI soft. Hb stable at 8. K+ low so will replete IV as po may be GI irritating. Check Mag. Her TSH is 5 so will restart Synthroid. Hopefully discharge tomorrow. Review of Systems:   A comprehensive review of systems was negative except for that written in the HPI. Objective:   Physical Exam:     Visit Vitals    /73 (BP 1 Location: Right arm, BP Patient Position: At rest)    Pulse 92    Temp 98.4 °F (36.9 °C)    Resp 16    Ht 4' 10\" (1.473 m)    Wt 109 lb 2 oz (49.5 kg)    SpO2 100%    BMI 22.81 kg/m2      O2 Device: Room air    Temp (24hrs), Av.2 °F (36.8 °C), Min:97.4 °F (36.3 °C), Max:98.9 °F (37.2 °C)         1901 - 05/10 0700  In: 1028.3 [P.O.:600]  Out: 3500 [Urine:3500]    General:  Alert, cooperative, no distress, appears stated age. Head:  Normocephalic, without obvious abnormality, atraumatic. Eyes:  Conjunctivae/corneas clear. PERRL, EOMs intact. Nose: Nares normal. Septum midline. Mucosa normal. No drainage or sinus tenderness.    Throat: Lips, mucosa, and tongue normal. Teeth and gums normal.   Neck: Supple, symmetrical, trachea midline, no adenopathy, thyroid: no enlargement/tenderness/nodules, no carotid bruit and no JVD. Back:   Symmetric, no curvature. ROM normal. No CVA tenderness. Lungs:   Clear to auscultation bilaterally. Chest wall:  No tenderness or deformity. Heart:  Regular rate and rhythm, S1, S2 normal, no murmur, click, rub or gallop. Abdomen:   Soft, non-tender. Bowel sounds normal. No masses,  No organomegaly. Extremities: Extremities normal, atraumatic, no cyanosis or edema. No calf tenderness or cords. Pulses: 2+ and symmetric all extremities. Skin: Skin color, texture, turgor normal. No rashes or lesions   Neurologic: CNII-XII intact. Alert and oriented X 3. Fine motor of hands and fingers normal.   equal.  No cogwheeling or rigidity. Gait not tested at this time. Sensation grossly normal to touch. Gross motor of extremities normal.       Data Review:       Recent Days:  Recent Labs      05/10/18   0055  05/09/18   0051  05/08/18   1300   WBC  6.4  7.6  12.1*   HGB  8.0*  8.2*  6.7*   HCT  24.5*  24.7*  20.1*   PLT  160  137*  251     Recent Labs      05/10/18   0055  05/09/18   0051  05/08/18   1300   NA  140  137  137   K  2.8*  3.8  3.8   CL  107  107  102   CO2  24  21  21   GLU  97  87  131*   BUN  7  17  23*   CREA  0.61  0.62  0.77   CA  8.1*  7.9*  8.6   MG   --   1.9   --    PHOS   --   3.0   --    ALB  3.1*  2.9*  3.4*   TBILI  0.4  1.4*  0.3   SGOT  33  34  31   ALT  29  20  31     No results for input(s): PH, PCO2, PO2, HCO3, FIO2 in the last 72 hours.     24 Hour Results:  Recent Results (from the past 24 hour(s))   POC H. PYLORI, TISSUE    Collection Time: 05/09/18 11:05 AM   Result Value Ref Range    H. pylori from tissue Negative Negative    Positive control Positive     Negative control Negative     Lot no. [id number]    CBC WITH AUTOMATED DIFF    Collection Time: 05/10/18 12:55 AM   Result Value Ref Range    WBC 6.4 3.6 - 11.0 K/uL    RBC 2.64 (L) 3.80 - 5.20 M/uL    HGB 8.0 (L) 11.5 - 16.0 g/dL    HCT 24.5 (L) 35.0 - 47.0 %    MCV 92.8 80.0 - 99.0 FL    MCH 30.3 26.0 - 34.0 PG    MCHC 32.7 30.0 - 36.5 g/dL    RDW 17.5 (H) 11.5 - 14.5 %    PLATELET 005 372 - 125 K/uL    MPV 9.9 8.9 - 12.9 FL    NRBC 0.0 0  WBC    ABSOLUTE NRBC 0.00 0.00 - 0.01 K/uL    NEUTROPHILS 50 32 - 75 %    LYMPHOCYTES 36 12 - 49 %    MONOCYTES 11 5 - 13 %    EOSINOPHILS 3 0 - 7 %    BASOPHILS 1 0 - 1 %    IMMATURE GRANULOCYTES 0 0.0 - 0.5 %    ABS. NEUTROPHILS 3.2 1.8 - 8.0 K/UL    ABS. LYMPHOCYTES 2.3 0.8 - 3.5 K/UL    ABS. MONOCYTES 0.7 0.0 - 1.0 K/UL    ABS. EOSINOPHILS 0.2 0.0 - 0.4 K/UL    ABS. BASOPHILS 0.0 0.0 - 0.1 K/UL    ABS. IMM. GRANS. 0.0 0.00 - 0.04 K/UL    DF AUTOMATED     METABOLIC PANEL, COMPREHENSIVE    Collection Time: 05/10/18 12:55 AM   Result Value Ref Range    Sodium 140 136 - 145 mmol/L    Potassium 2.8 (L) 3.5 - 5.1 mmol/L    Chloride 107 97 - 108 mmol/L    CO2 24 21 - 32 mmol/L    Anion gap 9 5 - 15 mmol/L    Glucose 97 65 - 100 mg/dL    BUN 7 6 - 20 MG/DL    Creatinine 0.61 0.55 - 1.02 MG/DL    BUN/Creatinine ratio 11 (L) 12 - 20      GFR est AA >60 >60 ml/min/1.73m2    GFR est non-AA >60 >60 ml/min/1.73m2    Calcium 8.1 (L) 8.5 - 10.1 MG/DL    Bilirubin, total 0.4 0.2 - 1.0 MG/DL    ALT (SGPT) 29 12 - 78 U/L    AST (SGOT) 33 15 - 37 U/L    Alk.  phosphatase 69 45 - 117 U/L    Protein, total 6.0 (L) 6.4 - 8.2 g/dL    Albumin 3.1 (L) 3.5 - 5.0 g/dL    Globulin 2.9 2.0 - 4.0 g/dL    A-G Ratio 1.1 1.1 - 2.2         Medications reviewed  Current Facility-Administered Medications   Medication Dose Route Frequency    levothyroxine (SYNTHROID) tablet 50 mcg  50 mcg Oral ACB    potassium chloride 10 mEq in 100 ml IVPB  10 mEq IntraVENous Q1H    DULoxetine (CYMBALTA) capsule 120 mg  120 mg Oral DAILY    LORazepam (ATIVAN) injection 1 mg  1 mg IntraVENous QHS PRN    0.9% sodium chloride infusion 250 mL  250 mL IntraVENous PRN    sodium chloride (NS) flush 5-10 mL  5-10 mL IntraVENous Q8H    sodium chloride (NS) flush 5-10 mL 5-10 mL IntraVENous PRN    acetaminophen (TYLENOL) tablet 650 mg  650 mg Oral Q4H PRN    morphine injection 2 mg  2 mg IntraVENous Q4H PRN    naloxone (NARCAN) injection 0.4 mg  0.4 mg IntraVENous PRN    diphenhydrAMINE (BENADRYL) injection 12.5 mg  12.5 mg IntraVENous Q4H PRN    prochlorperazine (COMPAZINE) injection 5 mg  5 mg IntraVENous Q8H PRN    pantoprazole (PROTONIX) 40 mg in sodium chloride 0.9% 10 mL injection  40 mg IntraVENous Q12H    LORazepam (ATIVAN) injection 2 mg  2 mg IntraVENous Q1H PRN    LORazepam (ATIVAN) injection 4 mg  4 mg IntraVENous H2M PRN    folic acid (FOLVITE) tablet 1 mg  1 mg Oral DAILY    Thiamine Mononitrate (B-1) tablet 100 mg  100 mg Oral DAILY    multivitamin with iron tablet 1 Tab  1 Tab Oral DAILY       Care Plan discussed with: Patient/Family and Nurse    Total time spent with patient and review of records: 30 minutes.     Oneil Santana MD

## 2018-05-10 NOTE — PROGRESS NOTES
Bedside and Verbal shift change report given to Theron (oncoming nurse) by Louise Alvarenga (offgoing nurse). Report included the following information SBAR, Kardex, Recent Results and Cardiac Rhythm nsr.     Dr Denise Motley made aware of K 2.8

## 2018-05-11 VITALS
WEIGHT: 109.13 LBS | OXYGEN SATURATION: 97 % | SYSTOLIC BLOOD PRESSURE: 101 MMHG | DIASTOLIC BLOOD PRESSURE: 69 MMHG | TEMPERATURE: 97.9 F | HEART RATE: 87 BPM | BODY MASS INDEX: 22.91 KG/M2 | RESPIRATION RATE: 18 BRPM | HEIGHT: 58 IN

## 2018-05-11 LAB
ANION GAP SERPL CALC-SCNC: 8 MMOL/L (ref 5–15)
BASOPHILS # BLD: 0 K/UL (ref 0–0.1)
BASOPHILS NFR BLD: 0 % (ref 0–1)
BUN SERPL-MCNC: 9 MG/DL (ref 6–20)
BUN/CREAT SERPL: 13 (ref 12–20)
CALCIUM SERPL-MCNC: 8.2 MG/DL (ref 8.5–10.1)
CHLORIDE SERPL-SCNC: 107 MMOL/L (ref 97–108)
CO2 SERPL-SCNC: 25 MMOL/L (ref 21–32)
CREAT SERPL-MCNC: 0.67 MG/DL (ref 0.55–1.02)
DIFFERENTIAL METHOD BLD: ABNORMAL
EOSINOPHIL # BLD: 0.3 K/UL (ref 0–0.4)
EOSINOPHIL NFR BLD: 4 % (ref 0–7)
ERYTHROCYTE [DISTWIDTH] IN BLOOD BY AUTOMATED COUNT: 17.3 % (ref 11.5–14.5)
GLUCOSE SERPL-MCNC: 104 MG/DL (ref 65–100)
HCT VFR BLD AUTO: 24.4 % (ref 35–47)
HGB BLD-MCNC: 7.9 G/DL (ref 11.5–16)
IMM GRANULOCYTES # BLD: 0 K/UL (ref 0–0.04)
IMM GRANULOCYTES NFR BLD AUTO: 0 % (ref 0–0.5)
LYMPHOCYTES # BLD: 2.1 K/UL (ref 0.8–3.5)
LYMPHOCYTES NFR BLD: 30 % (ref 12–49)
MCH RBC QN AUTO: 30.9 PG (ref 26–34)
MCHC RBC AUTO-ENTMCNC: 32.4 G/DL (ref 30–36.5)
MCV RBC AUTO: 95.3 FL (ref 80–99)
MONOCYTES # BLD: 0.6 K/UL (ref 0–1)
MONOCYTES NFR BLD: 9 % (ref 5–13)
NEUTS SEG # BLD: 3.9 K/UL (ref 1.8–8)
NEUTS SEG NFR BLD: 57 % (ref 32–75)
NRBC # BLD: 0 K/UL (ref 0–0.01)
NRBC BLD-RTO: 0 PER 100 WBC
PLATELET # BLD AUTO: 161 K/UL (ref 150–400)
PMV BLD AUTO: 10 FL (ref 8.9–12.9)
POTASSIUM SERPL-SCNC: 3.6 MMOL/L (ref 3.5–5.1)
RBC # BLD AUTO: 2.56 M/UL (ref 3.8–5.2)
SODIUM SERPL-SCNC: 140 MMOL/L (ref 136–145)
WBC # BLD AUTO: 6.9 K/UL (ref 3.6–11)

## 2018-05-11 PROCEDURE — 85025 COMPLETE CBC W/AUTO DIFF WBC: CPT | Performed by: FAMILY MEDICINE

## 2018-05-11 PROCEDURE — 80048 BASIC METABOLIC PNL TOTAL CA: CPT | Performed by: FAMILY MEDICINE

## 2018-05-11 PROCEDURE — 74011250637 HC RX REV CODE- 250/637: Performed by: FAMILY MEDICINE

## 2018-05-11 PROCEDURE — 74011250637 HC RX REV CODE- 250/637: Performed by: INTERNAL MEDICINE

## 2018-05-11 PROCEDURE — 74011250636 HC RX REV CODE- 250/636: Performed by: INTERNAL MEDICINE

## 2018-05-11 PROCEDURE — C9113 INJ PANTOPRAZOLE SODIUM, VIA: HCPCS | Performed by: INTERNAL MEDICINE

## 2018-05-11 PROCEDURE — 36415 COLL VENOUS BLD VENIPUNCTURE: CPT | Performed by: FAMILY MEDICINE

## 2018-05-11 RX ORDER — OMEPRAZOLE 20 MG/1
20 CAPSULE, DELAYED RELEASE ORAL 2 TIMES DAILY
Qty: 60 CAP | Refills: 3 | Status: SHIPPED | OUTPATIENT
Start: 2018-05-11 | End: 2019-04-05

## 2018-05-11 RX ORDER — POTASSIUM CHLORIDE 750 MG/1
10 TABLET, EXTENDED RELEASE ORAL DAILY
Qty: 30 TAB | Refills: 3 | Status: SHIPPED | OUTPATIENT
Start: 2018-05-11

## 2018-05-11 RX ORDER — BUTALBITAL, ACETAMINOPHEN AND CAFFEINE 300; 40; 50 MG/1; MG/1; MG/1
1 CAPSULE ORAL
Qty: 10 CAP | Refills: 0 | Status: SHIPPED | OUTPATIENT
Start: 2018-05-11

## 2018-05-11 RX ORDER — LEVOTHYROXINE SODIUM 50 UG/1
50 TABLET ORAL
Qty: 30 TAB | Refills: 0 | Status: SHIPPED | OUTPATIENT
Start: 2018-05-12

## 2018-05-11 RX ADMIN — Medication 10 ML: at 06:38

## 2018-05-11 RX ADMIN — PANTOPRAZOLE SODIUM 40 MG: 40 INJECTION, POWDER, FOR SOLUTION INTRAVENOUS at 08:25

## 2018-05-11 RX ADMIN — LEVOTHYROXINE SODIUM 50 MCG: 50 TABLET ORAL at 06:38

## 2018-05-11 RX ADMIN — Medication 1 TABLET: at 08:23

## 2018-05-11 RX ADMIN — Medication 100 MG: at 08:23

## 2018-05-11 RX ADMIN — DULOXETINE HYDROCHLORIDE 120 MG: 30 CAPSULE, DELAYED RELEASE ORAL at 08:23

## 2018-05-11 RX ADMIN — FOLIC ACID 1 MG: 1 TABLET ORAL at 08:23

## 2018-05-11 NOTE — PROGRESS NOTES
Hospital PCP MARIA INES follow-up appointment with Raya Isbell on Monday May 14,2018 @ 2:15 p.m.  Added to AVS.   Kaylin Ko CM Specialist

## 2018-05-11 NOTE — PROGRESS NOTES
Visit charted 3/11/18  Spiritual Care Partner Volunteer visited patient in [facility] on 3/10/18.   Documented by:  Maicol Bal [phone] Jeannette Edwards ([id number])

## 2018-05-11 NOTE — PROGRESS NOTES
3284  Bedside and Verbal shift change report given to [facility] (oncoming nurse) by Loreto Dorado (offgoing nurse). Report included the following information SBAR, Kardex, ED Summary, Procedure Summary, Intake/Output, MAR, Recent Results and Cardiac Rhythm NSR.

## 2018-05-11 NOTE — PROGRESS NOTES
Discharge order written. Dr. Remington Page has provided RX to patient. Updated AVS MAR with last dose given. All care plans and education are complete. 8337:  Paperwork reviewed. Pt is asking for a nutrition consult. None has been ordered by Dr. Francy Denton. IV removed; telemetry monitoring discontinued.

## 2018-05-11 NOTE — DISCHARGE INSTRUCTIONS
Patient Discharge Instructions    Solange Jackson / [id number] : 1965    Admitted 2018 Discharged: 2018 8:20 AM     ACUTE DIAGNOSES:  Acute GI bleeding  anemia    CHRONIC MEDICAL DIAGNOSES:  Problem List as of 2018  Date Reviewed: 3/5/2017          Codes Class Noted - Resolved    * (Principal)Acute GI bleeding ICD-10-CM: K92.2  ICD-9-CM: 578.9  2018 - Present        Gastric ulcer ICD-10-CM: K25.9  ICD-9-CM: 531.90  2018 - Present        SIRS (systemic inflammatory response syndrome) ([facility] ICD-10-CM: R65.10  ICD-9-CM: 995.90  2018 - Present        GI bleed ICD-10-CM: K92.2  ICD-9-CM: 578.9  3/4/2017 - Present        Acute blood loss anemia ICD-10-CM: D62  ICD-9-CM: 285.1  3/4/2017 - Present        Hypothyroidism (Chronic) ICD-10-CM: E03.9  ICD-9-CM: 244.9  6/3/2015 - Present        Depression (Chronic) ICD-10-CM: F32.9  ICD-9-CM: 921  6/3/2015 - Present        Microcytic anemia (Chronic) ICD-10-CM: D50.9  ICD-9-CM: 280.9  6/3/2015 - Present        Alcohol abuse (Chronic) ICD-10-CM: F10.10  ICD-9-CM: 305.00  6/3/2015 - Present        RESOLVED: SOB (shortness of breath) ICD-10-CM: R06.02  ICD-9-CM: 786.05  6/3/2015 - 3/4/2017              DISCHARGE MEDICATIONS:         · It is important that you take the medication exactly as they are prescribed. · Keep your medication in the bottles provided by the pharmacist and keep a list of the medication names, dosages, and times to be taken in your wallet. · Do not take other medications without consulting your doctor. NO Alcohol  DIET:  Low fat, Low cholesterol    ACTIVITY: Activity as tolerated    ADDITIONAL INFORMATION: If you experience any of the following symptoms then please call your primary care physician or return to the emergency room if you cannot get hold of your doctor: Fever, chills, nausea, vomiting, diarrhea, change in mentation, falling, bleeding, shortness of breath.     FOLLOW UP CARE:  Dr. Angelo Conway MD  you are to call and set up an appointment to see them with in 1 week. Follow-up with specialists at directed by them      Information obtained by :  I understand that if any problems occur once I am at home I am to contact my physician. I understand and acknowledge receipt of the instructions indicated above.                                                                                                                                            Physician's or R.N.'s Signature                                                                  Date/Time                                                                                                                                              Patient or Representative Signature                                                          Date/Time

## 2018-05-11 NOTE — PROGRESS NOTES
1930  Bedside and Verbal shift change report given to Valentina Chen RN (oncoming nurse) by Etienne Christensen RN (offgoing nurse). Report included the following information SBAR, Kardex, Procedure Summary, Intake/Output, MAR, Accordion and Recent Results. Visit Vitals    /55 (BP 1 Location: Right leg)  Comment (BP 1 Location): unable to obtain in arm.     Pulse 77    Temp 97.9 °F (36.6 °C)    Resp 20    Ht 4' 10\" (1.473 m)    Wt 49.5 kg (109 lb 2 oz)    SpO2 100%    BMI 22.81 kg/m2

## 2018-05-11 NOTE — PROGRESS NOTES
[address] NP  [phone]           GI PROGRESS NOTE        NAME: Kristen Kim   :  1965   MRN:  [id number]       Subjective:   Pt states she feels well. Is ready to go home. Objective:         VITALS:   Last 24hrs VS reviewed since prior progress note. Most recent are:  Visit Vitals    /69 (BP 1 Location: Right arm, BP Patient Position: At rest)    Pulse 87    Temp 97.9 °F (36.6 °C)    Resp 18    Ht 4' 10\" (1.473 m)    Wt 49.5 kg (109 lb 2 oz)    SpO2 97%    BMI 22.81 kg/m2       Intake/Output Summary (Last 24 hours) at 18 1015  Last data filed at 18 4149   Gross per 24 hour   Intake             1260 ml   Output              200 ml   Net             1060 ml       PHYSICAL EXAM:  General: Alert, in no acute distress    HEENT: Anicteric sclerae. Lungs:            CTA Bilaterally. Heart:  Regular  rhythm,    Abdomen: Soft, Non distended, Non tender.  (+)Bowel sounds, no HSM  Extremities: No c/c/e  Neurologic:  CN 2-12 gi, Alert and oriented X 3. No acute neurological distress   Psych:   Good insight. Not anxious nor agitated.     Lab Data Reviewed:   Recent Labs      18   0107  05/10/18   0055   WBC  6.9  6.4   HGB  7.9*  8.0*   HCT  24.4*  24.5*   PLT  161  160     Recent Labs      05/11/18   0107  05/10/18   0055  18   0051   NA  140  140  137   K  3.6  2.8*  3.8   CL  107  107  107   CO2  25  24  21   BUN  9  7  17   CREA  0.67  0.61  0.62   GLU  104*  97  87   PHOS   --    --   3.0   CA  8.2*  8.1*  7.9*     Recent Labs      05/10/18   0055  18   0051  18   1300   SGOT  33  34  31   AP  69  69  93   TP  6.0*  5.5*  6.8   ALB  3.1*  2.9*  3.4*   GLOB  2.9  2.6  3.4   LPSE   --    --   122       ________________________________________________________________________  Patient Active Problem List   Diagnosis Code    Hypothyroidism E03.9    Depression F32.9    Microcytic anemia D50.9    Alcohol abuse F10.10    GI bleed K92.2    Acute blood loss anemia D62    Acute GI bleeding K92.2    Gastric ulcer K25.9    SIRS (systemic inflammatory response syndrome) (HCC) R65.10         Assessment and Plan:  Upper GI Bleed:  Status EGD yesterday which showed an actively bleeding ulcer noted at the duodenal sweep. This was treated with epinephrine and heater probe therapy. HgB 7.9  Today.    - Diet as tolerated. - Continue PPI at discharge. - No NSAIDs. - Avoid alcohol. Pt will follow up in our office in 3 weeks. I have given her our contact information. OK to discharge from GI standpoint.          Signed By: Marisela Roque NP     5/11/2018  10:15 AM

## 2018-05-11 NOTE — PROGRESS NOTES
Pharmacist Discharge Medication Reconciliation    Discharge Provider:  Dr. Rebel Rousseau       Discharge Medications:      My Medications        START taking these medications         Instructions Each Dose to Equal   Morning Noon Evening Bedtime      butalbital-acetaminophen-caff -40 mg per capsule   Commonly known as:  FIORICET   Your last dose was:  Not given in the hospital.  Resume home dose as needed. Take 1 Cap by mouth every four (4) hours as needed for Pain. 1 Cap                        levothyroxine 50 mcg tablet   Commonly known as:  SYNTHROID   Start taking on:  5/12/2018   Your last dose was: Last dose given on 5/11/18 at 6:38 a.m. Take 1 Tab by mouth Daily (before breakfast). 50 mcg                        multivit-min-iron-FA-lutein 8 mg iron-400 mcg-300 mcg Tab   Commonly known as:  CENTRUM SILVER WOMEN   Your last dose was:  Received a multivitamin on 5/11/18 at 8:23 a.m. Take 1 Tab by mouth daily. 1 Tab                        omeprazole 20 mg capsule   Commonly known as:  PRILOSEC   Your last dose was:  Not given in the hospital.  Resume home dose as scheduled. Take 1 Cap by mouth two (2) times a day. 20 mg                        potassium chloride 10 mEq tablet   Commonly known as:  KLOR-CON   Your last dose was:  Not given in the hospital.  Resume home dose as scheduled. Take 1 Tab by mouth daily. 10 mEq                              CONTINUE taking these medications         Instructions Each Dose to Equal   Morning Noon Evening Bedtime      cyanocobalamin 1,000 mcg tablet   Your last dose was:  Not given in the hospital.  Resume home dose as scheduled. Take 1,000 mcg by mouth daily. 1000 mcg                        DULoxetine 60 mg capsule   Commonly known as:  CYMBALTA   Your last dose was: Last dose given on 5/11/18 at 8:23 a.m. Take 120 mg by mouth daily.     120 mg                        ferrous sulfate 325 mg (65 mg iron) tablet   Your last dose was:  Not given in the hospital.  Resume home dose as scheduled. Take 325 mg by mouth Daily (before breakfast). 325 mg                        HUMIRA 40 mg/0.8 mL injection   Generic drug:  adalimumab   Your last dose was:  Not given in the hospital.  Resume home dose as scheduled at discharge. 40 mg by SubCUTAneous route Once every 2 weeks. Indications: MODERATE TO SEVERE PLAQUE PSORIASIS    40 mg                        magnesium oxide 400 mg tablet   Commonly known as:  MAG-OX   Your last dose was:  Not given in the hospital.  Resume home dose at discharge. Take 400 mg by mouth daily. 400 mg                              STOP taking these medications              GOODY'S EXTRA STRENGTH PO                    Where to Get Your Medications        Information on where to get these meds will be given to you by the nurse or doctor. !  Ask your nurse or doctor about these medications     butalbital-acetaminophen-caff -40 mg per capsule    levothyroxine 50 mcg tablet    multivit-min-iron-FA-lutein 8 mg iron-400 mcg-300 mcg Tab    omeprazole 20 mg capsule    potassium chloride 10 mEq tablet                The patient's chart, MAR, and AVS were reviewed by   Belle Mccloud,   Contact: [phone]

## 2018-05-11 NOTE — PROGRESS NOTES
Bedside and Verbal shift change report given to [place] (oncoming nurse) by Toyin Garcia (offgoing nurse). Report included the following information SBAR, Kardex, Intake/Output, MAR, Recent Results and Cardiac Rhythm nsr. [id number] Dr Nayely Mathew at bedside, order noted for dc, RXs given directly to pt by Dr Martin Barnes. After MD left, pt asked if I would look at her toenail. Encouraged pt to address with PCP.

## 2018-05-11 NOTE — DISCHARGE SUMMARY
Physician Discharge Summary     Patient ID:    Joann Teixeira  [id number]  45 y.o.  1965  Jess Burdick MD    Admit date: 5/8/2018  Discharge date and time: 5/11/2018  Admission Diagnoses: Acute GI bleeding;anemia    Chronic Diagnoses:    Problem List as of 5/11/2018  Date Reviewed: 3/5/2017          Codes Class Noted - Resolved    * (Principal)Acute GI bleeding ICD-10-CM: K92.2  ICD-9-CM: 578.9  5/8/2018 - Present        Gastric ulcer ICD-10-CM: K25.9  ICD-9-CM: 531.90  5/8/2018 - Present        SIRS [facility]systemic [facility] response syndrome) ([facility] ICD-10-CM: R65.10  ICD-9-CM: 995.90  5/8/2018 - Present        GI bleed ICD-10-CM: K92.2  ICD-9-CM: 578.9  3/4/2017 - Present        Acute blood loss anemia ICD-10-CM: D62  ICD-9-CM: 285.1  3/4/2017 - Present        Hypothyroidism (Chronic) ICD-10-CM: E03.9  ICD-9-CM: 244.9  6/3/2015 - Present        Depression (Chronic) ICD-10-CM: F32.9  ICD-9-CM: 218  6/3/2015 - Present        Microcytic anemia (Chronic) ICD-10-CM: D50.9  ICD-9-CM: 280.9  6/3/2015 - Present        Alcohol abuse (Chronic) ICD-10-CM: F10.10  ICD-9-CM: 305.00  6/3/2015 - Present        RESOLVED: SOB (shortness of breath) ICD-10-CM: R06.02  ICD-9-CM: 786.05  6/3/2015 - 3/4/2017            Discharge Medications:   Current Discharge Medication List      START taking these medications    Details   levothyroxine (SYNTHROID) 50 mcg tablet Take 1 Tab by mouth Daily (before breakfast). Qty: 30 Tab, Refills: 0      multivit-min-iron-FA-lutein (CENTRUM SILVER WOMEN) 8 mg iron-400 mcg-300 mcg tab Take 1 Tab by mouth daily. Qty: 100 Tab, Refills: 0      butalbital-acetaminophen-caff (FIORICET) -40 mg per capsule Take 1 Cap by mouth every four (4) hours as needed for Pain. Qty: 10 Cap, Refills: 0    Associated Diagnoses: Intractable migraine without aura and without status migrainosus      omeprazole (PRILOSEC) 20 mg capsule Take 1 Cap by mouth two (2) times a day.   Qty: 60 Cap, Refills: 3 potassium chloride (KLOR-CON) 10 mEq tablet Take 1 Tab by mouth daily. Qty: 30 Tab, Refills: 3         CONTINUE these medications which have NOT CHANGED    Details   adalimumab (HUMIRA) 40 mg/0.8 mL injection 40 mg by SubCUTAneous route Once every 2 weeks. Indications: MODERATE TO SEVERE PLAQUE PSORIASIS      ferrous sulfate 325 mg (65 mg iron) tablet Take 325 mg by mouth Daily (before breakfast). cyanocobalamin 1,000 mcg tablet Take 1,000 mcg by mouth daily. magnesium oxide (MAG-OX) 400 mg tablet Take 400 mg by mouth daily. DULoxetine (CYMBALTA) 60 mg capsule Take 120 mg by mouth daily. STOP taking these medications       aspirin/acetaminophen/caffeine (GOODY'S EXTRA STRENGTH PO) Comments:   Reason for Stopping: Follow up Care:    1. Nica Mosqueda MD or other in office early next wk  2. specialists as directed. Diet:  Low fat, Low cholesterol  Disposition:  Home. Advanced Directive:  Discharge Exam:  [See today's progress note.]  CONSULTATIONS: GI    Significant Diagnostic Studies:   Recent Labs      05/11/18   0107  05/10/18   0055   WBC  6.9  6.4   HGB  7.9*  8.0*   HCT  24.4*  24.5*   PLT  161  160     Recent Labs      05/11/18   0107  05/10/18   0055  05/09/18   0051   NA  140  140  137   K  3.6  2.8*  3.8   CL  107  107  107   CO2  25  24  21   BUN  9  7  17   CREA  0.67  0.61  0.62   GLU  104*  97  87   CA  8.2*  8.1*  7.9*   MG   --   1.9  1.9   PHOS   --    --   3.0     Recent Labs      05/10/18   0055  05/09/18   0051  05/08/18   1300   SGOT  33  34  31   ALT  29  20  31   AP  69  69  93   TBILI  0.4  1.4*  0.3   TP  6.0*  5.5*  6.8   ALB  3.1*  2.9*  3.4*   GLOB  2.9  2.6  3.4   LPSE   --    --   122      Lab Results   Component Value Date/Time    Glucose (POC) 194 (H) 06/03/2015 11:26 AM    Glucose (POC) 99 07/09/2014 04:12 PM     Lab Results   Component Value Date/Time    TSH 5.17 (H) 05/09/2018 12:51 AM     HOSPITAL COURSE & TREATMENT RENDERED:   1.  See today's progress note:    Daily Progress Note and Discharge Note: 5/11/2018  Mary Lou Solis MD         Assessment/Plan:   Acute GI bleeding (5/8/2018)/ Hx Gastric ulcer (5/8/2018):  upper GI bleed due to Duodenal Ulcer. Trigger seems to be BC powder and ethanol use.    ---PPI daily  ---No ETOH, No NSAIDS - educated pt  ---EGD 5/9- Duodenal ulcer  ---repeat CBC early next wk at office      Acute blood loss anemia (3/4/2017): due to GI bleed. ---Transfused 2 units PRBCs. ---Monitor Hgb outpt.       Alcohol abuse (6/3/2015): counseled on cessation. Reports last drink 2 wks ago.  ---Risk of restarting discussed and discussed tx options - discuss with her PCP      SIRS (systemic inflammatory response syndrome) ([facility] (5/8/2018): likely from the GI bleed. ---Monitor       Depression (6/3/2015): resume Cymbalta      Hypothyroidism (6/3/2015): not taking any medications and prior TSH was normal.  ---Recheck TSH outpt  ---Started Synthroid 50 mcg daily     Hypokalemia - resolved  ---Recheck K+ and Mag outpt early next wk     Psoriasis POA: resume humira per PCP       HPI:   Ms. Frederick is a 45 y. o. female who is being admitted for acute GI bleeding. Ms. Frederick presented to our Emergency Department today complaining of melena stools for the past 2 days. Associated with a moderate aching upper abdominal discomfort, exertional SOB and lightheadedness. She denies any nausea, vomiting or hematemesis. She has been taking BC powder for sinus disease for the past 1-2 weeks as well as drinking alcohol which she says is intermittent. She was found to be anemic with a Hgb of 6.7. Her prior EGD had shown gastric ulcers. She will be admitted for further management. (Dr Ina Marsh)     5/9: Is due for EGD today. No further melena. Hb up to 8.2. She has been fatigued and would like to have her thyroid checked.      5/10: EGD showed duodenal ulcer- cauterized. She is tolerating liquids well so will advance to GI soft.  Hb stable at 8. K+ low so will replete IV as po may be GI irritating. Check Mag. Her TSH is 5 so will restart Synthroid. Hopefully discharge tomorrow.     :  No complaints and wants to go home. Hb 7.9 today. Discussed ETOH DC and tx options such as meds and AA. She is to follow up with PCP, or other in our office, for this and recheck of labs early next wk. Also educated no ASA or NSAIDS and she requested rx for Fioricet - will give her only 10 until seen by PCP. She also requests Librium and Valium and advised her she needs to see PCP for those rxs. Reviewed her meds with her. Report any problems to on call MD.  Follow up with GI as they direct.       Review of Systems:   A comprehensive review of systems was negative except for that written in the HPI.     Objective:   Physical Exam:        Visit Vitals    /60 (BP 1 Location: Right leg, BP Patient Position: At rest)    Pulse 84    Temp 97.9 °F (36.6 °C)    Resp 18    Ht 4' 10\" (1.473 m)    Wt 49.5 kg (109 lb 2 oz)    SpO2 100%    BMI 22.81 kg/m2      O2 Device: Room air     Temp (24hrs), Av °F (36.7 °C), Min:97.8 °F (36.6 °C), Max:98.3 °F (36.8 °C)         1901 -  0700  In: 2040 [P.O.:1540; I.V.:500]  Out: 3300 [Urine:3300]     General:  Alert, cooperative, no distress, appears stated age. Head:  Normocephalic, without obvious abnormality, atraumatic. Eyes:  Conjunctivae/corneas clear. PERRL, EOMs intact. Nose: Nares normal. Septum midline. Mucosa normal. No drainage or sinus tenderness. Throat: Lips, mucosa, and tongue normal. Teeth and gums normal.   Neck: Supple, symmetrical, trachea midline, no adenopathy, thyroid: no enlargement/tenderness/nodules, no carotid bruit and no JVD. Back:   Symmetric, no curvature. ROM normal. No CVA tenderness. Lungs:   Clear to auscultation bilaterally. Chest wall:  No tenderness or deformity. Heart:  Regular rate and rhythm, S1, S2 normal, no murmur, click, rub or gallop.    Abdomen: Soft, non-tender. Bowel sounds normal. No masses,  No organomegaly. Extremities: Extremities normal, atraumatic, no cyanosis or edema. No calf tenderness or cords. Pulses: 2+ and symmetric all extremities. Skin: Skin color, texture, turgor normal. No rashes or lesions   Neurologic: CNII-XII intact. Alert and oriented X 3. Fine motor of hands and fingers normal.   equal.  No cogwheeling or rigidity. Gait not tested at this time. Sensation grossly normal to touch.   Gross motor of extremities normal.        Signed:  Jamison Cleveland MD  5/11/2018  8:21 AM

## 2019-01-01 ENCOUNTER — HOSPITAL ENCOUNTER (EMERGENCY)
Age: 54
Discharge: HOME | End: 2019-01-01
Attending: EMERGENCY MEDICINE
Payer: MEDICARE

## 2019-01-01 VITALS
HEIGHT: 58 IN | DIASTOLIC BLOOD PRESSURE: 68 MMHG | WEIGHT: 113 LBS | HEART RATE: 85 BPM | OXYGEN SATURATION: 100 % | SYSTOLIC BLOOD PRESSURE: 114 MMHG | BODY MASS INDEX: 23.72 KG/M2 | RESPIRATION RATE: 18 BRPM | TEMPERATURE: 97.8 F

## 2019-01-01 DIAGNOSIS — E87.6 HYPOKALEMIA: ICD-10-CM

## 2019-01-01 DIAGNOSIS — E86.0 DEHYDRATION: Primary | ICD-10-CM

## 2019-01-01 DIAGNOSIS — R19.7 DIARRHEA, UNSPECIFIED TYPE: ICD-10-CM

## 2019-01-01 DIAGNOSIS — N17.9 ACUTE RENAL FAILURE, UNSPECIFIED ACUTE RENAL FAILURE TYPE (HCC): ICD-10-CM

## 2019-01-01 LAB
ALBUMIN SERPL-MCNC: 4.1 G/DL (ref 3.5–5)
ALBUMIN/GLOB SERPL: 0.9 {RATIO} (ref 1.1–2.2)
ALP SERPL-CCNC: 114 U/L (ref 45–117)
ALT SERPL-CCNC: 57 U/L (ref 12–78)
ANION GAP BLD CALC-SCNC: 19 MMOL/L (ref 10–20)
ANION GAP SERPL CALC-SCNC: 17 MMOL/L (ref 5–15)
AST SERPL-CCNC: 56 U/L (ref 15–37)
BASOPHILS # BLD: 0 K/UL (ref 0–0.1)
BASOPHILS NFR BLD: 1 % (ref 0–1)
BILIRUB SERPL-MCNC: 0.4 MG/DL (ref 0.2–1)
BUN BLD-MCNC: 14 MG/DL (ref 9–20)
BUN SERPL-MCNC: 14 MG/DL (ref 6–20)
BUN/CREAT SERPL: 7 (ref 12–20)
CA-I BLD-MCNC: 1.11 MMOL/L (ref 1.12–1.32)
CALCIUM SERPL-MCNC: 9.8 MG/DL (ref 8.5–10.1)
CHLORIDE BLD-SCNC: 105 MMOL/L (ref 98–107)
CHLORIDE SERPL-SCNC: 101 MMOL/L (ref 97–108)
CO2 BLD-SCNC: 22 MMOL/L (ref 21–32)
CO2 SERPL-SCNC: 21 MMOL/L (ref 21–32)
COMMENT, HOLDF: NORMAL
CREAT BLD-MCNC: 0.4 MG/DL (ref 0.6–1.3)
CREAT SERPL-MCNC: 1.93 MG/DL (ref 0.55–1.02)
DIFFERENTIAL METHOD BLD: ABNORMAL
EOSINOPHIL # BLD: 0 K/UL (ref 0–0.4)
EOSINOPHIL NFR BLD: 0 % (ref 0–7)
ERYTHROCYTE [DISTWIDTH] IN BLOOD BY AUTOMATED COUNT: 11.6 % (ref 11.5–14.5)
ETHANOL SERPL-MCNC: <10 MG/DL
GLOBULIN SER CALC-MCNC: 4.4 G/DL (ref 2–4)
GLUCOSE BLD-MCNC: 97 MG/DL (ref 65–100)
GLUCOSE SERPL-MCNC: 157 MG/DL (ref 65–100)
HCT VFR BLD AUTO: 43.3 % (ref 35–47)
HCT VFR BLD CALC: 36 % (ref 35–47)
HGB BLD-MCNC: 14.9 G/DL (ref 11.5–16)
IMM GRANULOCYTES # BLD: 0 K/UL (ref 0–0.04)
IMM GRANULOCYTES NFR BLD AUTO: 0 % (ref 0–0.5)
LIPASE SERPL-CCNC: 123 U/L (ref 73–393)
LYMPHOCYTES # BLD: 2.5 K/UL (ref 0.8–3.5)
LYMPHOCYTES NFR BLD: 35 % (ref 12–49)
MCH RBC QN AUTO: 34.7 PG (ref 26–34)
MCHC RBC AUTO-ENTMCNC: 34.4 G/DL (ref 30–36.5)
MCV RBC AUTO: 100.7 FL (ref 80–99)
MONOCYTES # BLD: 0.3 K/UL (ref 0–1)
MONOCYTES NFR BLD: 5 % (ref 5–13)
NEUTS SEG # BLD: 4.3 K/UL (ref 1.8–8)
NEUTS SEG NFR BLD: 59 % (ref 32–75)
NRBC # BLD: 0 K/UL (ref 0–0.01)
NRBC BLD-RTO: 0 PER 100 WBC
PLATELET # BLD AUTO: 238 K/UL (ref 150–400)
PMV BLD AUTO: 8.9 FL (ref 8.9–12.9)
POTASSIUM BLD-SCNC: 3.5 MMOL/L (ref 3.5–5.1)
POTASSIUM SERPL-SCNC: 2.6 MMOL/L (ref 3.5–5.1)
PROT SERPL-MCNC: 8.5 G/DL (ref 6.4–8.2)
RBC # BLD AUTO: 4.3 M/UL (ref 3.8–5.2)
SAMPLES BEING HELD,HOLD: NORMAL
SERVICE CMNT-IMP: ABNORMAL
SODIUM BLD-SCNC: 141 MMOL/L (ref 136–145)
SODIUM SERPL-SCNC: 139 MMOL/L (ref 136–145)
WBC # BLD AUTO: 7.2 K/UL (ref 3.6–11)

## 2019-01-01 PROCEDURE — 80053 COMPREHEN METABOLIC PANEL: CPT

## 2019-01-01 PROCEDURE — 74011250636 HC RX REV CODE- 250/636: Performed by: EMERGENCY MEDICINE

## 2019-01-01 PROCEDURE — 96375 TX/PRO/DX INJ NEW DRUG ADDON: CPT

## 2019-01-01 PROCEDURE — 80047 BASIC METABLC PNL IONIZED CA: CPT

## 2019-01-01 PROCEDURE — 96365 THER/PROPH/DIAG IV INF INIT: CPT

## 2019-01-01 PROCEDURE — 80307 DRUG TEST PRSMV CHEM ANLYZR: CPT

## 2019-01-01 PROCEDURE — 36415 COLL VENOUS BLD VENIPUNCTURE: CPT

## 2019-01-01 PROCEDURE — 85025 COMPLETE CBC W/AUTO DIFF WBC: CPT

## 2019-01-01 PROCEDURE — 74011250637 HC RX REV CODE- 250/637: Performed by: EMERGENCY MEDICINE

## 2019-01-01 PROCEDURE — 83690 ASSAY OF LIPASE: CPT

## 2019-01-01 PROCEDURE — 99285 EMERGENCY DEPT VISIT HI MDM: CPT

## 2019-01-01 RX ORDER — DICYCLOMINE HYDROCHLORIDE 20 MG/1
20 TABLET ORAL EVERY 6 HOURS
Qty: 20 TAB | Refills: 0 | Status: SHIPPED | OUTPATIENT
Start: 2019-01-01 | End: 2019-01-06

## 2019-01-01 RX ORDER — ONDANSETRON 2 MG/ML
4 INJECTION INTRAMUSCULAR; INTRAVENOUS
Status: COMPLETED | OUTPATIENT
Start: 2019-01-01 | End: 2019-01-01

## 2019-01-01 RX ORDER — SODIUM CHLORIDE 0.9 % (FLUSH) 0.9 %
5-10 SYRINGE (ML) INJECTION AS NEEDED
Status: DISCONTINUED | OUTPATIENT
Start: 2019-01-01 | End: 2019-01-01 | Stop reason: HOSPADM

## 2019-01-01 RX ORDER — POTASSIUM CHLORIDE 750 MG/1
40 TABLET, FILM COATED, EXTENDED RELEASE ORAL
Status: COMPLETED | OUTPATIENT
Start: 2019-01-01 | End: 2019-01-01

## 2019-01-01 RX ORDER — ONDANSETRON 4 MG/1
4 TABLET, ORALLY DISINTEGRATING ORAL
Qty: 20 TAB | Refills: 0 | Status: SHIPPED | OUTPATIENT
Start: 2019-01-01 | End: 2019-04-05

## 2019-01-01 RX ORDER — ACETAMINOPHEN 325 MG/1
650 TABLET ORAL
Status: COMPLETED | OUTPATIENT
Start: 2019-01-01 | End: 2019-01-01

## 2019-01-01 RX ORDER — POTASSIUM CHLORIDE 7.45 MG/ML
10 INJECTION INTRAVENOUS
Status: COMPLETED | OUTPATIENT
Start: 2019-01-01 | End: 2019-01-01

## 2019-01-01 RX ORDER — SODIUM CHLORIDE 0.9 % (FLUSH) 0.9 %
5-10 SYRINGE (ML) INJECTION EVERY 8 HOURS
Status: DISCONTINUED | OUTPATIENT
Start: 2019-01-01 | End: 2019-01-01 | Stop reason: HOSPADM

## 2019-01-01 RX ADMIN — ONDANSETRON 4 MG: 2 INJECTION INTRAMUSCULAR; INTRAVENOUS at 04:55

## 2019-01-01 RX ADMIN — POTASSIUM CHLORIDE 40 MEQ: 750 TABLET, EXTENDED RELEASE ORAL at 05:27

## 2019-01-01 RX ADMIN — ACETAMINOPHEN 650 MG: 325 TABLET, FILM COATED ORAL at 04:54

## 2019-01-01 RX ADMIN — SODIUM CHLORIDE 1000 ML: 900 INJECTION, SOLUTION INTRAVENOUS at 04:57

## 2019-01-01 RX ADMIN — SODIUM CHLORIDE 1000 ML: 900 INJECTION, SOLUTION INTRAVENOUS at 05:33

## 2019-01-01 RX ADMIN — POTASSIUM CHLORIDE 10 MEQ: 10 INJECTION, SOLUTION INTRAVENOUS at 05:27

## 2019-01-01 NOTE — DISCHARGE INSTRUCTIONS
We hope that we have addressed all of your medical concerns. The examination and treatment you received in the Emergency Department were for an emergent problem and were not intended as complete care. It is important that you follow up with your healthcare provider(s) for ongoing care. If your symptoms worsen or do not improve as expected, and you are unable to reach your usual health care provider(s), you should return to the Emergency Department. Today's healthcare is undergoing tremendous change, and patient satisfaction surveys are one of the many tools to assess the quality of medical care. You may receive a survey from the [employer] regarding your experience in the Emergency Department. I hope that your experience has been completely positive, particularly the medical care that I provided. As such, please participate in the survey; anything less than excellent does not meet my expectations or intentions. [facility] and [address] [place] [address] participate in nationally recognized quality of care measures. If your blood pressure is greater than 120/80, as reported below, we urge that you seek medical care to address the potential of high blood pressure, commonly known as hypertension. Hypertension can be hereditary or can be caused by certain medical conditions, pain, stress, or \"white coat syndrome. \"       Please make an appointment with your health care provider(s) for follow up of your Emergency Department visit. VITALS:   Patient Vitals for the past 8 hrs:   Temp Pulse Resp BP SpO2   01/01/19 0500 -- -- -- 116/83 100 %   01/01/19 0431 97.8 °F (36.6 °C) 85 18 120/87 99 %          Thank you for allowing us to provide you with medical care today. We realize that you have many choices for your emergency care needs. Please choose us in the future for any continued health care needs. Andie Dickson Fails, [address] [place], [place] [address]y 20.   Office: [phone]            Recent Results (from the past 24 hour(s))   CBC WITH AUTOMATED DIFF    Collection Time: 01/01/19  4:46 AM   Result Value Ref Range    WBC 7.2 3.6 - 11.0 K/uL    RBC 4.30 3.80 - 5.20 M/uL    HGB 14.9 11.5 - 16.0 g/dL    HCT 43.3 35.0 - 47.0 %    .7 (H) 80.0 - 99.0 FL    MCH 34.7 (H) 26.0 - 34.0 PG    MCHC 34.4 30.0 - 36.5 g/dL    RDW 11.6 11.5 - 14.5 %    PLATELET 010 967 - 975 K/uL    MPV 8.9 8.9 - 12.9 FL    NRBC 0.0 0  WBC    ABSOLUTE NRBC 0.00 0.00 - 0.01 K/uL    NEUTROPHILS 59 32 - 75 %    LYMPHOCYTES 35 12 - 49 %    MONOCYTES 5 5 - 13 %    EOSINOPHILS 0 0 - 7 %    BASOPHILS 1 0 - 1 %    IMMATURE GRANULOCYTES 0 0.0 - 0.5 %    ABS. NEUTROPHILS 4.3 1.8 - 8.0 K/UL    ABS. LYMPHOCYTES 2.5 0.8 - 3.5 K/UL    ABS. MONOCYTES 0.3 0.0 - 1.0 K/UL    ABS. EOSINOPHILS 0.0 0.0 - 0.4 K/UL    ABS. BASOPHILS 0.0 0.0 - 0.1 K/UL    ABS. IMM. GRANS. 0.0 0.00 - 0.04 K/UL    DF AUTOMATED     METABOLIC PANEL, COMPREHENSIVE    Collection Time: 01/01/19  4:46 AM   Result Value Ref Range    Sodium 139 136 - 145 mmol/L    Potassium 2.6 (LL) 3.5 - 5.1 mmol/L    Chloride 101 97 - 108 mmol/L    CO2 21 21 - 32 mmol/L    Anion gap 17 (H) 5 - 15 mmol/L    Glucose 157 (H) 65 - 100 mg/dL    BUN 14 6 - 20 MG/DL    Creatinine 1.93 (H) 0.55 - 1.02 MG/DL    BUN/Creatinine ratio 7 (L) 12 - 20      GFR est AA 33 (L) >60 ml/min/1.73m2    GFR est non-AA 27 (L) >60 ml/min/1.73m2    Calcium 9.8 8.5 - 10.1 MG/DL    Bilirubin, total 0.4 0.2 - 1.0 MG/DL    ALT (SGPT) 57 12 - 78 U/L    AST (SGOT) 56 (H) 15 - 37 U/L    Alk.  phosphatase 114 45 - 117 U/L    Protein, total 8.5 (H) 6.4 - 8.2 g/dL    Albumin 4.1 3.5 - 5.0 g/dL    Globulin 4.4 (H) 2.0 - 4.0 g/dL    A-G Ratio 0.9 (L) 1.1 - 2.2     LIPASE    Collection Time: 01/01/19  4:46 AM   Result Value Ref Range    Lipase 123 73 - 393 U/L   SAMPLES BEING HELD    Collection Time: 01/01/19  4:46 AM   Result Value Ref Range SAMPLES BEING HELD 1SST     COMMENT        Add-on orders for these samples will be processed based on acceptable specimen integrity and analyte stability, which may vary by analyte. ETHYL ALCOHOL    Collection Time: 01/01/19  4:46 AM   Result Value Ref Range    ALCOHOL(ETHYL),SERUM <10 <10 MG/DL   POC CHEM8    Collection Time: 01/01/19  6:43 AM   Result Value Ref Range    Calcium, ionized (POC) 1.11 (L) 1.12 - 1.32 mmol/L    Sodium (POC) 141 136 - 145 mmol/L    Potassium (POC) 3.5 3.5 - 5.1 mmol/L    Chloride (POC) 105 98 - 107 mmol/L    CO2 (POC) 22 21 - 32 mmol/L    Anion gap (POC) 19 10 - 20 mmol/L    Glucose (POC) 97 65 - 100 mg/dL    BUN (POC) 14 9 - 20 mg/dL    Creatinine (POC) 0.4 (L) 0.6 - 1.3 mg/dL    GFRAA, POC >60 >60 ml/min/1.73m2    GFRNA, POC >60 >60 ml/min/1.73m2    Hematocrit (POC) 36 35.0 - 47.0 %    Comment Comment Not Indicated. No results found. Dehydration: Care Instructions  Your Care Instructions  Dehydration happens when your body loses too much fluid. This might happen when you do not drink enough water or you lose large amounts of fluids from your body because of diarrhea, vomiting, or sweating. Severe dehydration can be life-threatening. Water and minerals called electrolytes help put your body fluids back in balance. Learn the early signs of fluid loss, and drink more fluids to prevent dehydration. Follow-up care is a key part of your treatment and safety. Be sure to make and go to all appointments, and call your doctor if you are having problems. It's also a good idea to know your test results and keep a list of the medicines you take. How can you care for yourself at home? · To prevent dehydration, drink plenty of fluids, enough so that your urine is light yellow or clear like water. Choose water and other caffeine-free clear liquids until you feel better.  If you have kidney, heart, or liver disease and have to limit fluids, talk with your doctor before you increase the amount of fluids you drink. · If you do not feel like eating or drinking, try taking small sips of water, sports drinks, or other rehydration drinks. · Get plenty of rest.  To prevent dehydration  · Add more fluids to your diet and daily routine, unless your doctor has told you not to. · During hot weather, drink more fluids. Drink even more fluids if you exercise a lot. Stay away from drinks with alcohol or caffeine. · Watch for the symptoms of dehydration. These include:  ? A dry, sticky mouth. ? Dark yellow urine, and not much of it. ? Dry and sunken eyes. ? Feeling very tired. · Learn what problems can lead to dehydration. These include:  ? Diarrhea, fever, and vomiting. ? Any illness with a fever, such as pneumonia or the flu. ? Activities that cause heavy sweating, such as endurance races and heavy outdoor work in hot or humid weather. ? Alcohol or drug abuse or withdrawal.  ? Certain medicines, such as cold and allergy pills (antihistamines), diet pills (diuretics), and laxatives. ? Certain diseases, such as diabetes, cancer, and heart or kidney disease. When should you call for help? Call 911 anytime you think you may need emergency care. For example, call if:    · You passed out (lost consciousness).    Call your doctor now or seek immediate medical care if:    · You are confused and cannot think clearly.     · You are dizzy or lightheaded, or you feel like you may faint.     · You have signs of needing more fluids. You have sunken eyes and a dry mouth, and you pass only a little dark urine.     · You cannot keep fluids down.    Watch closely for changes in your health, and be sure to contact your doctor if:    · You are not making tears.     · Your skin is very dry and sags slowly back into place after you pinch it.     · Your mouth and eyes are very dry. Where can you learn more? Go to http://sindy-abrahan.info/.   Enter W212 in the search box to learn more about \"Dehydration: Care Instructions. \"  Current as of: November 20, 2017  Content Version: 11.8  © 1067-3400 [employer]. Care instructions adapted under license by [employer] (which disclaims liability or warranty for this information). If you have questions about a medical condition or this instruction, always ask your healthcare professional. [address] any warranty or liability for your use of this information. Oral Rehydration: Care Instructions  Your Care Instructions    Dehydration occurs when your body loses too much water. This can happen if you do not drink enough fluids or lose a lot of fluid due to diarrhea, vomiting, or sweating. Being dehydrated can cause health problems and can even be life-threatening. To replace lost fluids, you need to drink liquid that contains special chemicals called electrolytes. Electrolytes keep your body working well. Plain water does not have electrolytes. You also need to rest to prevent more fluid loss. Replacing water and electrolytes (oral rehydration) completely takes about 36 hours. But you should feel better within a few hours. Follow-up care is a key part of your treatment and safety. Be sure to make and go to all appointments, and call your doctor if you are having problems. It's also a good idea to know your test results and keep a list of the medicines you take. How can you care for yourself at home? · Take frequent sips of a drink such as Gatorade, Powerade, or other rehydration drinks that your doctor suggests. These replace both fluid and important chemicals (electrolytes) you need for balance in your blood. · Drink 2 quarts of cool liquid over 2 to 4 hours. You should have at least 10 glasses of liquid a day to replace lost fluid. If you have kidney, heart, or liver disease and have to limit fluids, talk with your doctor before you increase the amount of fluids you drink. · Make your own drink. Measure everything carefully. The drink may not work well or may even be harmful if the amounts are off. ? 1 quart water  ? ½ teaspoon salt  ? 6 teaspoons sugar  · Do not drink liquid with caffeine, such as coffee and dania. · Do not drink any alcohol. It can make you dehydrated. · Drink plenty of fluids, enough so that your urine is light yellow or clear like water. If you have kidney, heart, or liver disease and have to limit fluids, talk with your doctor before you increase the amount of fluids you drink. When should you call for help? Call 911 anytime you think you may need emergency care. For example, call if:    · You have signs of severe dehydration, such as:  ? You are confused or unable to stay awake.  ? You passed out (lost consciousness).    Call your doctor now or seek immediate medical care if:    · You still have signs of dehydration. You have sunken eyes and a dry mouth, and you pass only a little dark urine.     · You are dizzy or lightheaded, or you feel like you may faint.     · You are not able to keep down fluids.    Watch closely for changes in your health, and be sure to contact your doctor if:    · You do not get better as expected. Where can you learn more? Go to http://sindy-abrahan.info/. Enter I040 in the search box to learn more about \"Oral Rehydration: Care Instructions. \"  Current as of: November 20, 2017  Content Version: 11.8  © 1260-7949 [employer]. Care instructions adapted under license by [employer] (which disclaims liability or warranty for this information). If you have questions about a medical condition or this instruction, always ask your healthcare professional. [address] any warranty or liability for your use of this information. Diarrhea: Care Instructions  Your Care Instructions    Diarrhea is loose, watery stools (bowel movements). The exact cause is often hard to find.  Sometimes diarrhea is your body's way of getting rid of what caused an upset stomach. Viruses, food poisoning, and many medicines can cause diarrhea. Some people get diarrhea in response to emotional stress, anxiety, or certain foods. Almost everyone has diarrhea now and then. It usually isn't serious, and your stools will return to normal soon. The important thing to do is replace the fluids you have lost, so you can prevent dehydration. The doctor has checked you carefully, but problems can develop later. If you notice any problems or new symptoms, get medical treatment right away. Follow-up care is a key part of your treatment and safety. Be sure to make and go to all appointments, and call your doctor if you are having problems. It's also a good idea to know your test results and keep a list of the medicines you take. How can you care for yourself at home? · Watch for signs of dehydration, which means your body has lost too much water. Dehydration is a serious condition and should be treated right away. Signs of dehydration are:  ? Increasing thirst and dry eyes and mouth. ? Feeling faint or lightheaded. ? Darker urine, and a smaller amount of urine than normal.  · To prevent dehydration, drink plenty of fluids, enough so that your urine is light yellow or clear like water. Choose water and other caffeine-free clear liquids until you feel better. If you have kidney, heart, or liver disease and have to limit fluids, talk with your doctor before you increase the amount of fluids you drink. · Begin eating small amounts of mild foods the next day, if you feel like it. ? Try yogurt that has live cultures of Lactobacillus. (Check the label.)  ? Avoid spicy foods, fruits, alcohol, and caffeine until 48 hours after all symptoms are gone. ? Avoid chewing gum that contains sorbitol. ? Avoid dairy products (except for yogurt with Lactobacillus) while you have diarrhea and for 3 days after symptoms are gone.   · The doctor may recommend that you take over-the-counter medicine, such as loperamide (Imodium), if you still have diarrhea after 6 hours. Read and follow all instructions on the label. Do not use this medicine if you have bloody diarrhea, a high fever, or other signs of serious illness. Call your doctor if you think you are having a problem with your medicine. When should you call for help? Call 911 anytime you think you may need emergency care. For example, call if:    · You passed out (lost consciousness).     · Your stools are maroon or very bloody.    Call your doctor now or seek immediate medical care if:    · You are dizzy or lightheaded, or you feel like you may faint.     · Your stools are black and look like tar, or they have streaks of blood.     · You have new or worse belly pain.     · You have symptoms of dehydration, such as:  ? Dry eyes and a dry mouth. ? Passing only a little dark urine. ? Feeling thirstier than usual.     · You have a new or higher fever.    Watch closely for changes in your health, and be sure to contact your doctor if:    · Your diarrhea is getting worse.     · You see pus in the diarrhea.     · You are not getting better after 2 days (48 hours). Where can you learn more? Go to http://sindy-abrahan.info/. Enter J765 in the search box to learn more about \"Diarrhea: Care Instructions. \"  Current as of: November 20, 2017  Content Version: 11.8  © 8391-3963 [employer]. Care instructions adapted under license by [employer] (which disclaims liability or warranty for this information). If you have questions about a medical condition or this instruction, always ask your healthcare professional. [place] [address] any warranty or liability for your use of this information. Hypokalemia: Care Instructions  Your Care Instructions    Hypokalemia (say \"[id number]-colton-[id number]\") is a low level of potassium.  The heart, muscles, kidneys, and nervous system all need potassium to work well. This problem has many different causes. Kidney problems, diet, and medicines like diuretics and laxatives can cause it. So can vomiting or diarrhea. In some cases, cancer is the cause. Your doctor may do tests to find the cause of your low potassium levels. You may need medicines to bring your potassium levels back to normal. You may also need regular blood tests to check your potassium. If you have very low potassium, you may need intravenous (IV) medicines. You also may need tests to check the electrical activity of your heart. Heart problems caused by low potassium levels can be very serious. Follow-up care is a key part of your treatment and safety. Be sure to make and go to all appointments, and call your doctor if you are having problems. It's also a good idea to know your test results and keep a list of the medicines you take. How can you care for yourself at home? · If your doctor recommends it, eat foods that have a lot of potassium. These include fresh fruits, juices, and vegetables. They also include nuts, beans, and milk. · Be safe with medicines. If your doctor prescribes medicines or potassium supplements, take them exactly as directed. Call your doctor if you have any problems with your medicines. · Get your potassium levels tested as often as your doctor tells you. When should you call for help? Call 911 anytime you think you may need emergency care. For example, call if:    · You feel like your heart is missing beats. Heart problems caused by low potassium can cause death.     · You passed out (lost consciousness).     · You have a seizure.    Call your doctor now or seek immediate medical care if:    · You feel weak or unusually tired.     · You have severe arm or leg cramps.     · You have tingling or numbness.     · You feel sick to your stomach, or you vomit.     · You have belly cramps.     · You feel bloated or constipated.   · You have to urinate a lot.     · You feel very thirsty most of the time.     · You are dizzy or lightheaded, or you feel like you may faint.     · You feel depressed, or you lose touch with reality.    Watch closely for changes in your health, and be sure to contact your doctor if:    · You do not get better as expected. Where can you learn more? Go to http://sindy-abrahan.info/. Enter G358 in the search box to learn more about \"Hypokalemia: Care Instructions. \"  Current as of: March 15, 2018  Content Version: 11.8  © 2580-3523 [employer]. Care instructions adapted under license by [employer] (which disclaims liability or warranty for this information). If you have questions about a medical condition or this instruction, always ask your healthcare professional. [address] any warranty or liability for your use of this information. Acute Kidney Injury: Care Instructions  Your Care Instructions    Acute kidney injury (JOSE EDUARDO) is a sudden decrease in kidney function. This can happen over a period of hours, days or, in some cases, weeks. JOSE EDUARDO used to be called acute renal failure, but kidney failure doesn't always happen with JOSE EDUARDO. Common causes of JOSE EDUARDO are dehydration, blood loss, and medicines. When JOSE EDUARDO happens, the kidneys have trouble removing waste and excess fluids from the body. The waste and fluids build up and become harmful. Kidney function may return to normal if the cause of JOSE EDUARDO is treated quickly. Your chance of a full recovery depends on what caused the problem, how quickly the cause was treated, and what other medical problems you have. A machine may be used to help your kidneys remove waste and fluids for a short period of time. This is called dialysis. Follow-up care is a key part of your treatment and safety. Be sure to make and go to all appointments, and call your doctor if you are having problems.  It's also a good idea to know your test results and keep a list of the medicines you take. How can you care for yourself at home? · Talk to your doctor about how much fluid you should drink. · Eat a balanced diet. Talk to your doctor or a dietitian about what type of diet may be best for you. You may need to limit sodium, potassium, and phosphorus. · If you need dialysis, follow the instructions and schedule for dialysis that your doctor gives you. · Do not smoke. Smoking can make your condition worse. If you need help quitting, talk to your doctor about stop-smoking programs and medicines. These can increase your chances of quitting for good. · Do not drink alcohol. · Review all of your medicines with your doctor. Do not take any medicines, including nonsteroidal anti-inflammatory drugs (NSAIDs) such as ibuprofen (Advil, Motrin) or naproxen (Aleve), unless your doctor says it is safe for you to do so. · Make sure that anyone treating you for any health problem knows that you have had JOSE EDUARDO. When should you call for help? Call 911 anytime you think you may need emergency care. For example, call if:    · You passed out (lost consciousness).    Call your doctor now or seek immediate medical care if:    · You have new or worse nausea and vomiting.     · You have much less urine than normal, or you have no urine.     · You are feeling confused or cannot think clearly.     · You have new or more blood in your urine.     · You have new swelling.     · You are dizzy or lightheaded, or feel like you may faint.    Watch closely for changes in your health, and be sure to contact your doctor if:    · You do not get better as expected. Where can you learn more? Go to http://sindy-abrahan.info/. Enter U102 in the search box to learn more about \"Acute Kidney Injury: Care Instructions. \"  Current as of: March 15, 2018  Content Version: 11.8  © 3304-1055 [employer].  Care instructions adapted under license by [employer] (which disclaims liability or warranty for this information). If you have questions about a medical condition or this instruction, always ask your healthcare professional. [address] any warranty or liability for your use of this information.

## 2019-01-01 NOTE — ED PROVIDER NOTES
Patient with c/o dehydration, states that she has not eaten in several days, has had some water and juice but not much. States has had diarrhea and has [place] sick for a very long time\". The history is provided by the patient and the EMS personnel. No  was used. Dehydration This is a new problem. The current episode started more than 2 days ago. The problem occurs daily. The problem has not changed since onset. Associated symptoms include headaches. Pertinent negatives include no chest pain, no abdominal pain and no shortness of breath. Nothing aggravates the symptoms. Nothing relieves the symptoms. She has tried nothing for the symptoms. Past Medical History:  
Diagnosis Date  Anemia  Asthma  Depression  Hypothyroid  Kidney stones 1988  Pancreatitis 7 years ago  Psychiatric disorder  Seizures ([facility] [address] Past Surgical History:  
Procedure Laterality Date  COLONOSCOPY  12/4/2014  EGD  12/4/2014  HX CHOLECYSTECTOMY  HX UROLOGICAL    
 kidney stone removed  UPPER GI ENDOSCOPY,BIOPSY  6/4/2015 Family History:  
Problem Relation Age of Onset  Cancer Neg Hx  Diabetes Neg Hx Social History Socioeconomic History  Marital status:  Spouse name: Not on file  Number of children: Not on file  Years of education: Not on file  Highest education level: Not on file Social Needs  Financial resource strain: Not on file  Food insecurity - worry: Not on file  Food insecurity - inability: Not on file  Transportation needs - medical: Not on file  Transportation needs - non-medical: Not on file Occupational History  Not on file Tobacco Use  Smoking status: Never Smoker  Smokeless tobacco: Never Used Substance and Sexual Activity  Alcohol use: Yes Alcohol/week: 1.8 oz Types: 3 Cans of beer per week Comment: 2-3 beers every other day.  Drug use: No  
 Sexual activity: Not on file Other Topics Concern  Not on file Social History Narrative  Not on file ALLERGIES: Pcn [penicillins] Review of Systems Constitutional: Negative for appetite change, chills, fever and unexpected weight change. HENT: Negative for ear pain, hearing loss, rhinorrhea and trouble swallowing. Eyes: Negative for pain and visual disturbance. Respiratory: Negative for cough, chest tightness and shortness of breath. Cardiovascular: Negative for chest pain and palpitations. Gastrointestinal: Positive for diarrhea. Negative for abdominal distention, abdominal pain, blood in stool, nausea and vomiting. Genitourinary: Negative for dysuria, hematuria and urgency. Musculoskeletal: Negative for back pain and myalgias. Skin: Negative for rash. Neurological: Positive for headaches. Negative for dizziness, syncope, weakness and numbness. Psychiatric/Behavioral: Negative for confusion and suicidal ideas. All other systems reviewed and are negative. Vitals:  
 01/01/19 0500 01/01/19 0530 01/01/19 0600 01/01/19 0631 BP: 116/83 131/80 98/69 114/68 Pulse:      
Resp:      
Temp:      
SpO2: 100% 99% 100% 100% Weight:      
Height:      
      
 
Physical Exam  
Constitutional: She is oriented to person, place, and time. She appears well-developed and well-nourished. No distress. HENT:  
Head: Normocephalic and atraumatic. Right Ear: External ear normal.  
Left Ear: External ear normal.  
Nose: Nose normal.  
Mouth/Throat: Uvula is midline. Mucous membranes are dry (minimally). No trismus in the jaw. No uvula swelling. No oropharyngeal exudate. Eyes: Conjunctivae and EOM are normal. Pupils are equal, round, and reactive to light. Right eye exhibits no discharge. Left eye exhibits no discharge. No scleral icterus. Neck: Normal range of motion. Neck supple. No JVD present. No tracheal deviation present. Cardiovascular: Normal rate, regular rhythm, normal heart sounds and intact distal pulses. Exam reveals no gallop and no friction rub. No murmur heard. Pulmonary/Chest: Effort normal and breath sounds normal. No stridor. No respiratory distress. She has no decreased breath sounds. She has no wheezes. She has no rhonchi. She has no rales. She exhibits no tenderness. Abdominal: Soft. Bowel sounds are normal. She exhibits no distension. There is no tenderness. There is no rebound and no guarding. Musculoskeletal: Normal range of motion. She exhibits no edema or tenderness. Neurological: She is alert and oriented to person, place, and time. She has normal strength and normal reflexes. She displays normal reflexes. No cranial nerve deficit or sensory deficit. She exhibits normal muscle tone. Coordination normal. GCS eye subscore is 4. GCS verbal subscore is 5. GCS motor subscore is 6. Skin: Skin is warm and dry. Capillary refill takes less than 2 seconds. No rash noted. She is not diaphoretic. No erythema. No pallor. Psychiatric: She has a normal mood and affect. Her behavior is normal. Judgment and thought content normal.  
Nursing note and vitals reviewed. MDM Number of Diagnoses or Management Options Amount and/or Complexity of Data Reviewed Clinical lab tests: ordered and reviewed Risk of Complications, Morbidity, and/or Mortality Presenting problems: moderate Diagnostic procedures: low Management options: moderate Patient Progress Patient progress: stable Procedures Chief Complaint Patient presents with  Dehydration The patient's presenting problems have been discussed, and they are in agreement with the care plan formulated and outlined with them. I have encouraged them to ask questions as they arise throughout their visit. MEDICATIONS GIVEN: 
Medications  
sodium chloride (NS) flush 5-10 mL ( IntraVENous Canceled Entry 1/1/19 0600) sodium chloride (NS) flush 5-10 mL (not administered)  
sodium chloride 0.9 % bolus infusion 1,000 mL (0 mL IntraVENous IV Completed 1/1/19 4252)  
acetaminophen (TYLENOL) tablet 650 mg (650 mg Oral Given 1/1/19 5094) ondansetron (ZOFRAN) injection 4 mg (4 mg IntraVENous Given 1/1/19 8335) potassium chloride 10 mEq in 100 ml IVPB (0 mEq IntraVENous IV Completed 1/1/19 1127) potassium chloride SR (KLOR-CON 10) tablet 40 mEq (40 mEq Oral Given 1/1/19 0527)  
sodium chloride 0.9 % bolus infusion 1,000 mL (0 mL IntraVENous IV Completed 1/1/19 2744) LABS REVIEWED: 
Recent Results (from the past 24 hour(s)) CBC WITH AUTOMATED DIFF Collection Time: 01/01/19  4:46 AM  
Result Value Ref Range WBC 7.2 3.6 - 11.0 K/uL  
 RBC 4.30 3.80 - 5.20 M/uL  
 HGB 14.9 11.5 - 16.0 g/dL HCT 43.3 35.0 - 47.0 % .7 (H) 80.0 - 99.0 FL  
 MCH 34.7 (H) 26.0 - 34.0 PG  
 MCHC 34.4 30.0 - 36.5 g/dL  
 RDW 11.6 11.5 - 14.5 % PLATELET 417 444 - 839 K/uL MPV 8.9 8.9 - 12.9 FL  
 NRBC 0.0 0  WBC ABSOLUTE NRBC 0.00 0.00 - 0.01 K/uL NEUTROPHILS 59 32 - 75 % LYMPHOCYTES 35 12 - 49 % MONOCYTES 5 5 - 13 % EOSINOPHILS 0 0 - 7 % BASOPHILS 1 0 - 1 % IMMATURE GRANULOCYTES 0 0.0 - 0.5 % ABS. NEUTROPHILS 4.3 1.8 - 8.0 K/UL  
 ABS. LYMPHOCYTES 2.5 0.8 - 3.5 K/UL  
 ABS. MONOCYTES 0.3 0.0 - 1.0 K/UL  
 ABS. EOSINOPHILS 0.0 0.0 - 0.4 K/UL  
 ABS. BASOPHILS 0.0 0.0 - 0.1 K/UL  
 ABS. IMM. GRANS. 0.0 0.00 - 0.04 K/UL  
 DF AUTOMATED METABOLIC PANEL, COMPREHENSIVE Collection Time: 01/01/19  4:46 AM  
Result Value Ref Range Sodium 139 136 - 145 mmol/L Potassium 2.6 (LL) 3.5 - 5.1 mmol/L Chloride 101 97 - 108 mmol/L  
 CO2 21 21 - 32 mmol/L Anion gap 17 (H) 5 - 15 mmol/L Glucose 157 (H) 65 - 100 mg/dL BUN 14 6 - 20 MG/DL Creatinine 1.93 (H) 0.55 - 1.02 MG/DL  
 BUN/Creatinine ratio 7 (L) 12 - 20 GFR est AA 33 (L) >60 ml/min/1.73m2 GFR est non-AA 27 (L) >60 ml/min/1.73m2 Calcium 9.8 8.5 - 10.1 MG/DL Bilirubin, total 0.4 0.2 - 1.0 MG/DL  
 ALT (SGPT) 57 12 - 78 U/L  
 AST (SGOT) 56 (H) 15 - 37 U/L Alk. phosphatase 114 45 - 117 U/L Protein, total 8.5 (H) 6.4 - 8.2 g/dL Albumin 4.1 3.5 - 5.0 g/dL Globulin 4.4 (H) 2.0 - 4.0 g/dL A-G Ratio 0.9 (L) 1.1 - 2.2 LIPASE Collection Time: 01/01/19  4:46 AM  
Result Value Ref Range Lipase 123 73 - 393 U/L  
SAMPLES BEING HELD Collection Time: 01/01/19  4:46 AM  
Result Value Ref Range SAMPLES BEING HELD 1SST COMMENT Add-on orders for these samples will be processed based on acceptable specimen integrity and analyte stability, which may vary by analyte. ETHYL ALCOHOL Collection Time: 01/01/19  4:46 AM  
Result Value Ref Range ALCOHOL(ETHYL),SERUM <10 <10 MG/DL  
POC CHEM8 Collection Time: 01/01/19  6:43 AM  
Result Value Ref Range Calcium, ionized (POC) 1.11 (L) 1.12 - 1.32 mmol/L Sodium (POC) 141 136 - 145 mmol/L Potassium (POC) 3.5 3.5 - 5.1 mmol/L Chloride (POC) 105 98 - 107 mmol/L  
 CO2 (POC) 22 21 - 32 mmol/L Anion gap (POC) 19 10 - 20 mmol/L Glucose (POC) 97 65 - 100 mg/dL BUN (POC) 14 9 - 20 mg/dL Creatinine (POC) 0.4 (L) 0.6 - 1.3 mg/dL GFRAA, POC >60 >60 ml/min/1.73m2 GFRNA, POC >60 >60 ml/min/1.73m2 Hematocrit (POC) 36 35.0 - 47.0 % Comment Comment Not Indicated. VITAL SIGNS: 
Patient Vitals for the past 12 hrs: 
 Temp Pulse Resp BP SpO2  
01/01/19 0631    114/68 100 % 01/01/19 0600    98/69 100 % 01/01/19 0530    131/80 99 % 01/01/19 0500    116/83 100 % 01/01/19 0431 97.8 °F (36.6 °C) 85 18 120/87 99 % RADIOLOGY RESULTS: 
The following have been ordered and reviewed: 
No results found. PROGRESS NOTES: 
Discussed results and plan with patient. Patient will be discharged home with PCP follow up.  Patient instructed to return to the emergency room for any worsening symptoms or any other concerns. DIAGNOSIS: 
 
1. Dehydration 2. Hypokalemia 3. Diarrhea, unspecified type 4. Acute renal failure, unspecified acute renal failure type ([facility] [address] PLAN: 
Follow-up Information Follow up With Specialties Details Why Contact Info Lilian Morris MD [facility] Schedule an appointment as soon as possible for a visit  [address] [address] 
[phone] [facility] EMERGENCY DEPT Emergency Medicine  If symptoms worsen [address] [facility] 
[phone] Current Discharge Medication List  
  
START taking these medications Details  
dicyclomine (BENTYL) 20 mg tablet Take 1 Tab by mouth every six (6) hours for 20 doses. Qty: 20 Tab, Refills: 0  
  
ondansetron (ZOFRAN ODT) 4 mg disintegrating tablet Take 1 Tab by mouth every eight (8) hours as needed for Nausea. Qty: 20 Tab, Refills: 0 CONTINUE these medications which have NOT CHANGED Details  
levothyroxine (SYNTHROID) 50 mcg tablet Take 1 Tab by mouth Daily (before breakfast). Qty: 30 Tab, Refills: 0  
  
multivit-min-iron-FA-lutein (CENTRUM SILVER WOMEN) 8 mg iron-400 mcg-300 mcg tab Take 1 Tab by mouth daily. Qty: 100 Tab, Refills: 0  
  
butalbital-acetaminophen-caff (FIORICET) -40 mg per capsule Take 1 Cap by mouth every four (4) hours as needed for Pain. Qty: 10 Cap, Refills: 0 Associated Diagnoses: Intractable migraine without aura and without status migrainosus  
  
omeprazole (PRILOSEC) 20 mg capsule Take 1 Cap by mouth two (2) times a day. Qty: 60 Cap, Refills: 3  
  
potassium chloride (KLOR-CON) 10 mEq tablet Take 1 Tab by mouth daily. Qty: 30 Tab, Refills: 3  
  
adalimumab (HUMIRA) 40 mg/0.8 mL injection 40 mg by SubCUTAneous route Once every 2 weeks. Indications: MODERATE TO SEVERE PLAQUE PSORIASIS  
  
ferrous sulfate 325 mg (65 mg iron) tablet Take 325 mg by mouth Daily (before breakfast). cyanocobalamin 1,000 mcg tablet Take 1,000 mcg by mouth daily. magnesium oxide (MAG-OX) 400 mg tablet Take 400 mg by mouth daily. DULoxetine (CYMBALTA) 60 mg capsule Take 120 mg by mouth daily. ED COURSE: The patient's hospital course has been uncomplicated.

## 2019-01-01 NOTE — ED TRIAGE NOTES
Patient with c/o dehydration, states that she has not eaten in several days, has had some water and juice but not much. States has had diarrhea and has [place] sick for a very long time\". Per EMS, the house is in disarray, patient lives alone, and has several pets in the house. Medic advises they are going to refer to community paramedic due to the living situation.

## 2019-04-05 ENCOUNTER — HOSPITAL ENCOUNTER (INPATIENT)
Age: 54
LOS: 4 days | Discharge: HOME | DRG: 378 | End: 2019-04-09
Attending: EMERGENCY MEDICINE | Admitting: INTERNAL MEDICINE
Payer: MEDICARE

## 2019-04-05 DIAGNOSIS — K92.1 MELENA: Primary | ICD-10-CM

## 2019-04-05 PROBLEM — K26.9 DUODENAL ULCER: Status: ACTIVE | Noted: 2019-04-05

## 2019-04-05 LAB
ALBUMIN SERPL-MCNC: 3 G/DL (ref 3.5–5)
ALBUMIN/GLOB SERPL: 0.9 {RATIO} (ref 1.1–2.2)
ALP SERPL-CCNC: 92 U/L (ref 45–117)
ALT SERPL-CCNC: 27 U/L (ref 12–78)
ANION GAP BLD CALC-SCNC: 19 MMOL/L (ref 10–20)
ANION GAP SERPL CALC-SCNC: 11 MMOL/L (ref 5–15)
AST SERPL-CCNC: 34 U/L (ref 15–37)
BILIRUB SERPL-MCNC: 0.2 MG/DL (ref 0.2–1)
BUN BLD-MCNC: 20 MG/DL (ref 9–20)
BUN SERPL-MCNC: 19 MG/DL (ref 6–20)
BUN/CREAT SERPL: 26 (ref 12–20)
CA-I BLD-MCNC: 1.19 MMOL/L (ref 1.12–1.32)
CALCIUM SERPL-MCNC: 8.7 MG/DL (ref 8.5–10.1)
CHLORIDE BLD-SCNC: 101 MMOL/L (ref 98–107)
CHLORIDE SERPL-SCNC: 104 MMOL/L (ref 97–108)
CO2 BLD-SCNC: 18 MMOL/L (ref 21–32)
CO2 SERPL-SCNC: 18 MMOL/L (ref 21–32)
COMMENT, HOLDF: NORMAL
CREAT BLD-MCNC: 0.6 MG/DL (ref 0.6–1.3)
CREAT SERPL-MCNC: 0.74 MG/DL (ref 0.55–1.02)
GLOBULIN SER CALC-MCNC: 3.2 G/DL (ref 2–4)
GLUCOSE BLD-MCNC: 143 MG/DL (ref 65–100)
GLUCOSE SERPL-MCNC: 141 MG/DL (ref 65–100)
HCT VFR BLD CALC: 15 % (ref 35–47)
HEMOCCULT STL QL: POSITIVE
INR PPP: 1.1 (ref 0.9–1.1)
LIPASE SERPL-CCNC: 95 U/L (ref 73–393)
POTASSIUM BLD-SCNC: 4.2 MMOL/L (ref 3.5–5.1)
POTASSIUM SERPL-SCNC: 4.5 MMOL/L (ref 3.5–5.1)
PROT SERPL-MCNC: 6.2 G/DL (ref 6.4–8.2)
PROTHROMBIN TIME: 11.4 SEC (ref 9–11.1)
SAMPLES BEING HELD,HOLD: NORMAL
SERVICE CMNT-IMP: ABNORMAL
SODIUM BLD-SCNC: 132 MMOL/L (ref 136–145)
SODIUM SERPL-SCNC: 133 MMOL/L (ref 136–145)

## 2019-04-05 PROCEDURE — C9113 INJ PANTOPRAZOLE SODIUM, VIA: HCPCS | Performed by: EMERGENCY MEDICINE

## 2019-04-05 PROCEDURE — 83690 ASSAY OF LIPASE: CPT

## 2019-04-05 PROCEDURE — 74011250636 HC RX REV CODE- 250/636: Performed by: EMERGENCY MEDICINE

## 2019-04-05 PROCEDURE — C9113 INJ PANTOPRAZOLE SODIUM, VIA: HCPCS | Performed by: STUDENT IN AN ORGANIZED HEALTH CARE EDUCATION/TRAINING PROGRAM

## 2019-04-05 PROCEDURE — 96361 HYDRATE IV INFUSION ADD-ON: CPT

## 2019-04-05 PROCEDURE — 86900 BLOOD TYPING SEROLOGIC ABO: CPT

## 2019-04-05 PROCEDURE — 30233N1 TRANSFUSION OF NONAUTOLOGOUS RED BLOOD CELLS INTO PERIPHERAL VEIN, PERCUTANEOUS APPROACH: ICD-10-PCS | Performed by: FAMILY MEDICINE

## 2019-04-05 PROCEDURE — 80053 COMPREHEN METABOLIC PANEL: CPT

## 2019-04-05 PROCEDURE — 36415 COLL VENOUS BLD VENIPUNCTURE: CPT

## 2019-04-05 PROCEDURE — 85610 PROTHROMBIN TIME: CPT

## 2019-04-05 PROCEDURE — 74011250636 HC RX REV CODE- 250/636: Performed by: STUDENT IN AN ORGANIZED HEALTH CARE EDUCATION/TRAINING PROGRAM

## 2019-04-05 PROCEDURE — 80047 BASIC METABLC PNL IONIZED CA: CPT

## 2019-04-05 PROCEDURE — 36430 TRANSFUSION BLD/BLD COMPNT: CPT

## 2019-04-05 PROCEDURE — 93005 ELECTROCARDIOGRAM TRACING: CPT

## 2019-04-05 PROCEDURE — 82272 OCCULT BLD FECES 1-3 TESTS: CPT

## 2019-04-05 PROCEDURE — 86923 COMPATIBILITY TEST ELECTRIC: CPT

## 2019-04-05 PROCEDURE — 65610000006 HC RM INTENSIVE CARE

## 2019-04-05 PROCEDURE — P9016 RBC LEUKOCYTES REDUCED: HCPCS

## 2019-04-05 PROCEDURE — 99284 EMERGENCY DEPT VISIT MOD MDM: CPT

## 2019-04-05 PROCEDURE — 85025 COMPLETE CBC W/AUTO DIFF WBC: CPT

## 2019-04-05 PROCEDURE — 96374 THER/PROPH/DIAG INJ IV PUSH: CPT

## 2019-04-05 RX ORDER — PANTOPRAZOLE SODIUM 40 MG/10ML
40 INJECTION, POWDER, LYOPHILIZED, FOR SOLUTION INTRAVENOUS
Status: COMPLETED | OUTPATIENT
Start: 2019-04-05 | End: 2019-04-05

## 2019-04-05 RX ORDER — PHENOL/SODIUM PHENOLATE
20 AEROSOL, SPRAY (ML) MUCOUS MEMBRANE
Status: ON HOLD | COMMUNITY
End: 2019-04-09 | Stop reason: SDUPTHER

## 2019-04-05 RX ORDER — SODIUM CHLORIDE 9 MG/ML
250 INJECTION, SOLUTION INTRAVENOUS AS NEEDED
Status: DISPENSED | OUTPATIENT
Start: 2019-04-05 | End: 2019-04-06

## 2019-04-05 RX ORDER — DULOXETIN HYDROCHLORIDE 30 MG/1
120 CAPSULE, DELAYED RELEASE ORAL DAILY
Status: DISCONTINUED | OUTPATIENT
Start: 2019-04-06 | End: 2019-04-09 | Stop reason: HOSPADM

## 2019-04-05 RX ORDER — LEVOTHYROXINE SODIUM 50 UG/1
50 TABLET ORAL
Status: DISCONTINUED | OUTPATIENT
Start: 2019-04-06 | End: 2019-04-09 | Stop reason: HOSPADM

## 2019-04-05 RX ORDER — NALOXONE HYDROCHLORIDE 0.4 MG/ML
0.4 INJECTION, SOLUTION INTRAMUSCULAR; INTRAVENOUS; SUBCUTANEOUS AS NEEDED
Status: DISCONTINUED | OUTPATIENT
Start: 2019-04-05 | End: 2019-04-09 | Stop reason: HOSPADM

## 2019-04-05 RX ORDER — CHLORDIAZEPOXIDE HYDROCHLORIDE 5 MG/1
10 CAPSULE, GELATIN COATED ORAL
Status: DISCONTINUED | OUTPATIENT
Start: 2019-04-05 | End: 2019-04-09 | Stop reason: HOSPADM

## 2019-04-05 RX ORDER — ALBUTEROL SULFATE 90 UG/1
2 AEROSOL, METERED RESPIRATORY (INHALATION)
COMMUNITY

## 2019-04-05 RX ORDER — CHLORDIAZEPOXIDE HYDROCHLORIDE 5 MG/1
10 CAPSULE, GELATIN COATED ORAL
COMMUNITY

## 2019-04-05 RX ORDER — SODIUM CHLORIDE 0.9 % (FLUSH) 0.9 %
5-40 SYRINGE (ML) INJECTION EVERY 8 HOURS
Status: DISCONTINUED | OUTPATIENT
Start: 2019-04-05 | End: 2019-04-09 | Stop reason: HOSPADM

## 2019-04-05 RX ORDER — ACETAMINOPHEN 325 MG/1
650 TABLET ORAL
Status: DISCONTINUED | OUTPATIENT
Start: 2019-04-05 | End: 2019-04-09 | Stop reason: HOSPADM

## 2019-04-05 RX ORDER — ONDANSETRON 2 MG/ML
4 INJECTION INTRAMUSCULAR; INTRAVENOUS
Status: DISCONTINUED | OUTPATIENT
Start: 2019-04-05 | End: 2019-04-09 | Stop reason: HOSPADM

## 2019-04-05 RX ORDER — SODIUM CHLORIDE 0.9 % (FLUSH) 0.9 %
5-40 SYRINGE (ML) INJECTION AS NEEDED
Status: DISCONTINUED | OUTPATIENT
Start: 2019-04-05 | End: 2019-04-09 | Stop reason: HOSPADM

## 2019-04-05 RX ADMIN — PANTOPRAZOLE SODIUM 40 MG: 40 INJECTION, POWDER, FOR SOLUTION INTRAVENOUS at 18:53

## 2019-04-05 RX ADMIN — SODIUM CHLORIDE 1000 ML: 900 INJECTION, SOLUTION INTRAVENOUS at 19:27

## 2019-04-05 RX ADMIN — PANTOPRAZOLE SODIUM 40 MG: 40 INJECTION, POWDER, FOR SOLUTION INTRAVENOUS at 18:32

## 2019-04-05 RX ADMIN — Medication 10 ML: at 21:02

## 2019-04-05 RX ADMIN — SODIUM CHLORIDE 1000 ML: 900 INJECTION, SOLUTION INTRAVENOUS at 18:32

## 2019-04-05 NOTE — PROGRESS NOTES
[facility]: MED RECONCILIATION Medications added:  
Librium Albuterol MDI Vitamin C Medications adjusted: 
Prilosec- previously 20mg BID Information obtained from: Patient (alert, oriented, good historian), RxQuery, previous medical records Allergies: Pcn [penicillins] Prior to Admission Medications:  
 
Medication Documentation Review Audit Reviewed by Daniel Whitley, PHARMD (Pharmacist) on 04/05/19 at 7949 Medication Sig Documenting Provider Last Dose Status Taking? adalimumab (HUMIRA) 40 mg/0.8 mL injection 40 mg by SubCUTAneous route Once every 2 weeks. Indications: MODERATE TO SEVERE PLAQUE PSORIASIS Provider, Historical  Active Med Note (Aidan Rios. Fri Apr 5, 2019  7:17 PM) Due next Wed (4/10/19). Pt probably will not be able to supply her own this time since she did not bring it with her)  
albuterol (PROAIR HFA) 90 mcg/actuation inhaler Take 2 Puffs by inhalation every four (4) hours as needed for Wheezing. Provider, Historical  Active Yes  
ascorbate calcium (VITAMIN C PO) Take 1 Tab by mouth daily as needed. Provider, Historical 4/4/2019 AM Active Yes Med Note (Aidan Rios. Fri Apr 5, 2019  7:20 PM) Uses when concerned about colds \"coming on\" butalbital-acetaminophen-caff (FIORICET) -40 mg per capsule Take 1 Cap by mouth every four (4) hours as needed for Pain. Carlos Garcia MD  Active Yes  
chlordiazePOXIDE (LIBRIUM) 5 mg capsule Take 10 mg by mouth three (3) times daily as needed for Anxiety. Provider, Historical  Active Yes Med Note (Aidan Rios. Fri Apr 5, 2019  7:25 PM) Has prescription on file at pharmacy (prescribed 3/19/19). Pt not currently using but will use to help her not drink alcohol. Pt indicates some beers a few days ago and denies going through withdrawals  
cyanocobalamin 1,000 mcg tablet Take 1,000 mcg by mouth daily. Provider, Historical  Active Yes DULoxetine (CYMBALTA) 60 mg capsule Take 120 mg by mouth daily. Provider, Historical 4/4/2019 AM Active Yes Med Note (Mylinda Ink. Fri Apr 5, 2019  7:18 PM) Pt states she ran out and may need a new prescription/refill authorization  
ferrous sulfate 325 mg (65 mg iron) tablet Take 325 mg by mouth Daily (before breakfast). Provider, Historical 4/4/2019 AM Active Yes  
levothyroxine (SYNTHROID) 50 mcg tablet Take 1 Tab by mouth Daily (before breakfast). Brii Spangler MD 4/4/2019 AM Active Yes  
magnesium oxide (MAG-OX) 400 mg tablet Take 400 mg by mouth daily. Provider, Historical 4/4/2019 AM Active Yes  
multivit-min-iron-FA-lutein (CENTRUM SILVER WOMEN) 8 mg iron-400 mcg-300 mcg tab Take 1 Tab by mouth daily. Brii Spangler MD  Active Yes Omeprazole delayed release (PRILOSEC D/R) 20 mg tablet Take 20 mg by mouth daily as needed. Provider, Historical  Active Yes Med Note (Mylinda Ink. Fri Apr 5, 2019  7:19 PM) Pt indicated she had used a couple of days earlier this week. potassium chloride (KLOR-CON) 10 mEq tablet Take 1 Tab by mouth daily. Brii Spangler MD 4/5/2019 AM Active Yes Raffi Salgado Bolus

## 2019-04-05 NOTE — ED TRIAGE NOTES
Patient here with c/o \" I think my stomach ulcers are bleeding again\". Black tarry stool x 2 days.

## 2019-04-05 NOTE — ED PROVIDER NOTES
Pt presents to ED and states that she feels like her stomach ulcers may be bleeding again. Pt reports SOB (onset of 4/2/19) which she states she typically gets before having problems with her ulcers. Pt also reports weakness, melena, chest pain, pallor. 6:15 PM 
I have evaluated the patient as the Provider in Triage. I have reviewed Her vital signs and the triage nurse assessment. I have talked with the patient and any available family and advised that I am the provider in triage and have ordered the appropriate study to initiate their work up based on the clinical presentation during my assessment. I have advised that the patient will be accommodated in the Main ED as soon as possible. I have also requested to contact the triage nurse or myself immediately if the patient experiences any changes in their condition during this brief waiting period. The history is provided by the patient. No  was used. Past Medical History:  
Diagnosis Date  Anemia  Asthma  Depression  Hypothyroid  Kidney stones 1988  Pancreatitis 7 years ago  Psychiatric disorder  Seizures ([facility] [address] Past Surgical History:  
Procedure Laterality Date  COLONOSCOPY  12/4/2014  EGD  12/4/2014  HX CHOLECYSTECTOMY  HX UROLOGICAL    
 kidney stone removed  UPPER GI ENDOSCOPY,BIOPSY  6/4/2015 Family History:  
Problem Relation Age of Onset  Cancer Neg Hx  Diabetes Neg Hx Social History Socioeconomic History  Marital status:  Spouse name: Not on file  Number of children: Not on file  Years of education: Not on file  Highest education level: Not on file Occupational History  Not on file Social Needs  Financial resource strain: Not on file  Food insecurity:  
  Worry: Not on file Inability: Not on file  Transportation needs:  
  Medical: Not on file Non-medical: Not on file Tobacco Use  Smoking status: Never Smoker  Smokeless tobacco: Never Used Substance and Sexual Activity  Alcohol use: Yes Alcohol/week: 1.8 oz Types: 3 Cans of beer per week Comment: 2-3 beers every other day.  Drug use: No  
 Sexual activity: Not on file Lifestyle  Physical activity:  
  Days per week: Not on file Minutes per session: Not on file  Stress: Not on file Relationships  Social connections:  
  Talks on phone: Not on file Gets together: Not on file Attends Orthodox service: Not on file Active member of club or organization: Not on file Attends meetings of clubs or organizations: Not on file Relationship status: Not on file  Intimate partner violence:  
  Fear of current or ex partner: Not on file Emotionally abused: Not on file Physically abused: Not on file Forced sexual activity: Not on file Other Topics Concern  Not on file Social History Narrative  Not on file ALLERGIES: Pcn [penicillins] Review of Systems Constitutional: Positive for fatigue. Negative for chills and fever. Eyes: Negative for photophobia. Respiratory: Positive for shortness of breath. Cardiovascular: Positive for chest pain. Negative for leg swelling. Gastrointestinal: Positive for blood in stool. Negative for nausea. Skin: Positive for pallor. Neurological: Positive for weakness. All other systems reviewed and are negative. There were no vitals filed for this visit. Physical Exam  
Constitutional: She appears well-nourished. No distress. HENT:  
Head: Normocephalic. Eyes: Pupils are equal, round, and reactive to light. Cardiovascular:  
Tachycardic regular Pulmonary/Chest: Effort normal. No stridor. No respiratory distress. Abdominal: She exhibits no distension. There is tenderness (epigastric). Neurological: She is alert. Skin: Skin is warm. Markedly pale conjunctiva,skin Psychiatric: Her behavior is normal.  
  
 
MDM Number of Diagnoses or Management Options Amount and/or Complexity of Data Reviewed Clinical lab tests: ordered and reviewed Tests in the radiology section of CPT®: ordered Decide to obtain previous medical records or to obtain history from someone other than the patient: yes Risk of Complications, Morbidity, and/or Mortality Presenting problems: high Management options: high Critical Care Total time providing critical care: 30-74 minutes Procedures Anju Molina is a 52 y.o. female presenting with acute ugib. Differential includes ulcer, varix, avm, diulafoys. Course: IVF, 80 mg protonix, 2 IV access, 4 units ordered based on hct = 15, active bleeding. GI, hospitalist paged on arrival 
8:01 PM d/w Dr. Ella Coffman from GI, Dr. Tha Garay to eval, agrees with blood for resus

## 2019-04-05 NOTE — H&P
[facility] [address], [place] [address] 
[phone] Admission History and Physical 
 
 
NAME:              Hollis Trujillo :   1965 MRN:  [id number] PCP:  Suzy Kirby MD  
 
Date:     2019 Chief  Complaint: Fatigue, SOB, melena stools History Of Presenting Illness: Ms. Harvinder Nina is a 52 y.o. female who is being admitted for acute blood loss anemia due to a GI bleed. Ms. Harvinder Nina presented to our Emergency Department today complaining of a progressively worsening fatigue, exertional SOB associated with palpitations and melena stools. She says she has had melanotic stools for the past several days but denies any hematemesis. She has a hx of a bleeding duodenal ulcer for which she was admitted in May 2018 and an EGD done then showed 1 ulcer(s) 8 mm in size located in  duodenal sweep for which she had an injection of 9 cc of epinephrine and heater probe therapy. She says she still drinks alcohol and has not been consistent in taking PPIs. Her Hgb today was found to be 5.2 g/dL and she remains tachycardic. She will be admitted for further management. Allergies Allergen Reactions  Pcn [Penicillins] Hives Prior to Admission medications Medication Sig Start Date End Date Taking? Authorizing Provider Omeprazole delayed release (PRILOSEC D/R) 20 mg tablet Take 20 mg by mouth daily as needed. Yes Provider, Historical  
albuterol (PROAIR HFA) 90 mcg/actuation inhaler Take 2 Puffs by inhalation every four (4) hours as needed for Wheezing. Yes Provider, Historical  
ascorbate calcium (VITAMIN C PO) Take 1 Tab by mouth daily as needed. Yes Provider, Historical  
chlordiazePOXIDE (LIBRIUM) 5 mg capsule Take 10 mg by mouth three (3) times daily as needed for Anxiety.    Yes Provider, Historical  
 levothyroxine (SYNTHROID) 50 mcg tablet Take 1 Tab by mouth Daily (before breakfast). 5/12/18  Yes Kaye Antonio MD  
multivit-min-iron-FA-lutein (CENTRUM SILVER WOMEN) 8 mg iron-400 mcg-300 mcg tab Take 1 Tab by mouth daily. 5/11/18  Yes Kaye Antonio MD  
butalbital-acetaminophen-caff (FIORICET) -40 mg per capsule Take 1 Cap by mouth every four (4) hours as needed for Pain. 5/11/18  Yes Kaye Antonio MD  
potassium chloride (KLOR-CON) 10 mEq tablet Take 1 Tab by mouth daily. 5/11/18  Yes Kaye Antonio MD  
adalimumab (HUMIRA) 40 mg/0.8 mL injection 40 mg by SubCUTAneous route Once every 2 weeks. Indications: MODERATE TO SEVERE PLAQUE PSORIASIS   Yes Provider, Historical  
ferrous sulfate 325 mg (65 mg iron) tablet Take 325 mg by mouth Daily (before breakfast). Yes Provider, Historical  
cyanocobalamin 1,000 mcg tablet Take 1,000 mcg by mouth daily. Yes Provider, Historical  
magnesium oxide (MAG-OX) 400 mg tablet Take 400 mg by mouth daily. Yes Provider, Historical  
DULoxetine (CYMBALTA) 60 mg capsule Take 120 mg by mouth daily. Yes Provider, Historical  
 
 
Past Medical History:  
Diagnosis Date  Anemia  Asthma  Depression  Hypothyroid  Kidney stones 1988  Pancreatitis 7 years ago  Psychiatric disorder  Seizures ([facility] [address] Past Surgical History:  
Procedure Laterality Date  COLONOSCOPY  12/4/2014  EGD  12/4/2014  HX CHOLECYSTECTOMY  HX UROLOGICAL    
 kidney stone removed  UPPER GI ENDOSCOPY,BIOPSY  6/4/2015 Social History Tobacco Use  Smoking status: Never Smoker  Smokeless tobacco: Never Used Substance Use Topics  Alcohol use: Yes Alcohol/week: 1.8 oz Types: 3 Cans of beer per week Comment: 2-3 beers every other day. Family History Problem Relation Age of Onset  Cancer Neg Hx  Diabetes Neg Hx Review of Systems: Constitutional ROS: no fever, chills, rigors or night sweats Respiratory ROS: no cough, sputum, hemoptysis but exertional dyspnea. Cardiovascular ROS: no chest pain but palpitations. No orthopnea, PND or syncope Endocrine ROS: no polydispsia, polyuria, heat or cold intolerance or major weight change. Gastrointestinal ROS: no dysphagia but mild upper abdominal pain, nausea and melena. Genito-Urinary ROS: no dysuria, frequency, hematuria, retention or flank pain Musculoskeletal ROS: no joint pain, swelling or muscular tenderness Neurological ROS: no headache, confusion, focal weakness or any other neurological symptoms Psychiatric ROS: no depression, anxiety, mood swings Dermatological ROS: no rash, pruritis, or urticaria Heme-Lymph ROS: no swollen glands, bleeding Examination: 
 
Constitutional:   
Visit Vitals /57 Pulse (!) 127 Temp 97.6 °F (36.4 °C) Resp 20 Ht 4' 10\" (1.473 m) Wt 47.6 kg (105 lb) SpO2 100% BMI 21.95 kg/m² General:  Weak and ill looking patient in no acute distress Eyes: Pale conjunctivae, PERRLA with no discharge. Normal eye movements Ear, Nose, Mouth & Throat: No ottorrhea, rhinorrhea, non tender sinuses, dry mucous membranes Respiratory:  No accessory muscle use, clear breath sounds without crackles or wheezes Cardiovascular:  No JVD or murmurs, tachycardic, without thrills, bruits or peripheral edema. GI & :  Soft abdomen, epigastric discomfort, non-distended, normoactive bowel sounds with no palpable organomegaly Heme:  No cervical or axillary adenopathy. Musculoskeletal:  No cyanosis, clubbing, atrophy or deformities Skin:  No rashes, bruising or ulcers Neurological: Awake and alert, speech is clear, CNs 2-12 are grossly intact and otherwise non focal 
Psychiatric:  Has a fair insight and is oriented x 3 
________________________________________________________________________ Data Review: 
 
Labs: 
 
Recent Labs 04/05/19 [address] [place] [address] [place] WBC 11.3* HGB 5.2* HCT 15.8*  
* Recent Labs 04/05/19 
1830 * K 4.5  
 CO2 18* * BUN 19  
CREA 0.74 CA 8.7 ALB 3.0*  
SGOT 34 ALT 27 No components found for: [place] No results for input(s): PH, PCO2, PO2, HCO3, FIO2 in the last 72 hours. No results for input(s): INR in the last 72 hours. No lab exists for component: INREXT I have also reviewed available old medical records. Assessment & Impression: Ms. Valeriano Gallo is a 52 y.o. female being evaluated for:  
 
Principal Problem: 
  Acute blood loss anemia (3/4/2017) Active Problems: Hypothyroidism (6/3/2015) Depression (6/3/2015) SOB (shortness of breath) (6/3/2015) Alcohol abuse (6/3/2015) Acute GI bleeding (5/8/2018) Duodenal ulcer (4/5/2019) Plan of management: 
 
Acute blood loss anemia (3/4/2017): Severe and symptomatic. Likely from the prior duodenal ulcers. Hgb 5.2. Still likely bleeding with borderline hypotension. Admit to ICU. Transfuse PRBCs and monitor Hgb. Target above 7 Acute GI bleeding (5/8/2018) / HxDuodenal ulcer (4/5/2019): likely triggered by ongoing alcohol use. No NSAIDs use. Start IV PPi. Consult GI. EGD once hemodynamically stable 
 
SOB (shortness of breath) (6/3/2015) / Sinus tachycardia POA: due to severe anemia. Should improve with transfusion and cessation of ongoing bleeding Alcohol abuse (6/3/2015): still active. Closely watch for withdrawal symptoms. Resume Librium. Counseled on cesssation Hypothyroidism (6/3/2015): resume Levothyrxine Depression (6/3/2015): resume Cymbalta Code Status:  Full Surrogate decision maker: Family Risk of deterioration: high Total time spent for the care of the patient: 67  Minutes Critical care Care Plan discussed with: Patient, Family, Nursing Staff, ED physician and Dr Christie Land who will assume further care Discussed:  Code Status, Care Plan and D/C Planning Prophylaxis:  H2B/PPI Probable Disposition:  Home w/Family 
        
___________________________________________________ Attending Physician: Desire Atkins MD

## 2019-04-06 ENCOUNTER — APPOINTMENT (OUTPATIENT)
Dept: GENERAL RADIOLOGY | Age: 54
DRG: 378 | End: 2019-04-06
Attending: INTERNAL MEDICINE
Payer: MEDICARE

## 2019-04-06 PROBLEM — K92.1 MELENA: Status: ACTIVE | Noted: 2019-04-06

## 2019-04-06 LAB
ALBUMIN SERPL-MCNC: 2.9 G/DL (ref 3.5–5)
ALBUMIN/GLOB SERPL: 1.2 {RATIO} (ref 1.1–2.2)
ALP SERPL-CCNC: 73 U/L (ref 45–117)
ALT SERPL-CCNC: 23 U/L (ref 12–78)
ANION GAP SERPL CALC-SCNC: 6 MMOL/L (ref 5–15)
AST SERPL-CCNC: 19 U/L (ref 15–37)
BILIRUB SERPL-MCNC: 0.9 MG/DL (ref 0.2–1)
BUN SERPL-MCNC: 12 MG/DL (ref 6–20)
BUN/CREAT SERPL: 23 (ref 12–20)
CALCIUM SERPL-MCNC: 7.9 MG/DL (ref 8.5–10.1)
CHLORIDE SERPL-SCNC: 110 MMOL/L (ref 97–108)
CO2 SERPL-SCNC: 23 MMOL/L (ref 21–32)
CREAT SERPL-MCNC: 0.53 MG/DL (ref 0.55–1.02)
GLOBULIN SER CALC-MCNC: 2.4 G/DL (ref 2–4)
GLUCOSE SERPL-MCNC: 94 MG/DL (ref 65–100)
HCT VFR BLD AUTO: 21.3 % (ref 35–47)
HCT VFR BLD AUTO: 22.4 % (ref 35–47)
HCT VFR BLD AUTO: 23 % (ref 35–47)
HGB BLD-MCNC: 7.2 G/DL (ref 11.5–16)
HGB BLD-MCNC: 7.6 G/DL (ref 11.5–16)
HGB BLD-MCNC: 7.6 G/DL (ref 11.5–16)
LACTATE SERPL-SCNC: 0.9 MMOL/L (ref 0.4–2)
POTASSIUM SERPL-SCNC: 3.5 MMOL/L (ref 3.5–5.1)
PROT SERPL-MCNC: 5.3 G/DL (ref 6.4–8.2)
SODIUM SERPL-SCNC: 139 MMOL/L (ref 136–145)

## 2019-04-06 PROCEDURE — 74011250636 HC RX REV CODE- 250/636: Performed by: INTERNAL MEDICINE

## 2019-04-06 PROCEDURE — 74011250637 HC RX REV CODE- 250/637: Performed by: INTERNAL MEDICINE

## 2019-04-06 PROCEDURE — 80053 COMPREHEN METABOLIC PANEL: CPT

## 2019-04-06 PROCEDURE — 0DJ08ZZ INSPECTION OF UPPER INTESTINAL TRACT, VIA NATURAL OR ARTIFICIAL OPENING ENDOSCOPIC: ICD-10-PCS | Performed by: INTERNAL MEDICINE

## 2019-04-06 PROCEDURE — 76040000019: Performed by: INTERNAL MEDICINE

## 2019-04-06 PROCEDURE — 36415 COLL VENOUS BLD VENIPUNCTURE: CPT

## 2019-04-06 PROCEDURE — 65610000006 HC RM INTENSIVE CARE

## 2019-04-06 PROCEDURE — 74011000258 HC RX REV CODE- 258: Performed by: INTERNAL MEDICINE

## 2019-04-06 PROCEDURE — 74011250636 HC RX REV CODE- 250/636

## 2019-04-06 PROCEDURE — 71045 X-RAY EXAM CHEST 1 VIEW: CPT

## 2019-04-06 PROCEDURE — 85018 HEMOGLOBIN: CPT

## 2019-04-06 PROCEDURE — 99152 MOD SED SAME PHYS/QHP 5/>YRS: CPT

## 2019-04-06 PROCEDURE — 83605 ASSAY OF LACTIC ACID: CPT

## 2019-04-06 PROCEDURE — C9113 INJ PANTOPRAZOLE SODIUM, VIA: HCPCS | Performed by: INTERNAL MEDICINE

## 2019-04-06 RX ORDER — SODIUM CHLORIDE 9 MG/ML
50 INJECTION, SOLUTION INTRAVENOUS CONTINUOUS
Status: DISPENSED | OUTPATIENT
Start: 2019-04-06 | End: 2019-04-06

## 2019-04-06 RX ORDER — ATROPINE SULFATE 0.1 MG/ML
0.4 INJECTION INTRAVENOUS
Status: ACTIVE | OUTPATIENT
Start: 2019-04-06 | End: 2019-04-07

## 2019-04-06 RX ORDER — NALOXONE HYDROCHLORIDE 0.4 MG/ML
0.4 INJECTION, SOLUTION INTRAMUSCULAR; INTRAVENOUS; SUBCUTANEOUS
Status: ACTIVE | OUTPATIENT
Start: 2019-04-06 | End: 2019-04-06

## 2019-04-06 RX ORDER — FENTANYL CITRATE 50 UG/ML
100 INJECTION, SOLUTION INTRAMUSCULAR; INTRAVENOUS ONCE
Status: CANCELLED | OUTPATIENT
Start: 2019-04-06 | End: 2019-04-06

## 2019-04-06 RX ORDER — FENTANYL CITRATE 50 UG/ML
25-100 INJECTION, SOLUTION INTRAMUSCULAR; INTRAVENOUS ONCE
Status: COMPLETED | OUTPATIENT
Start: 2019-04-06 | End: 2019-04-06

## 2019-04-06 RX ORDER — MIDAZOLAM HYDROCHLORIDE 1 MG/ML
.25-5 INJECTION, SOLUTION INTRAMUSCULAR; INTRAVENOUS
Status: DISCONTINUED | OUTPATIENT
Start: 2019-04-06 | End: 2019-04-09 | Stop reason: HOSPADM

## 2019-04-06 RX ORDER — SODIUM CHLORIDE 450 MG/100ML
75 INJECTION, SOLUTION INTRAVENOUS CONTINUOUS
Status: DISCONTINUED | OUTPATIENT
Start: 2019-04-06 | End: 2019-04-09 | Stop reason: HOSPADM

## 2019-04-06 RX ORDER — DIPHENHYDRAMINE HYDROCHLORIDE 50 MG/ML
50 INJECTION, SOLUTION INTRAMUSCULAR; INTRAVENOUS
Status: ACTIVE | OUTPATIENT
Start: 2019-04-06 | End: 2019-04-07

## 2019-04-06 RX ORDER — MIDAZOLAM HYDROCHLORIDE 5 MG/ML
5 INJECTION INTRAMUSCULAR; INTRAVENOUS ONCE
Status: COMPLETED | OUTPATIENT
Start: 2019-04-06 | End: 2019-04-06

## 2019-04-06 RX ORDER — DIPHENHYDRAMINE HYDROCHLORIDE 50 MG/ML
INJECTION, SOLUTION INTRAMUSCULAR; INTRAVENOUS
Status: COMPLETED
Start: 2019-04-06 | End: 2019-04-06

## 2019-04-06 RX ORDER — DEXTROMETHORPHAN/PSEUDOEPHED 2.5-7.5/.8
1.2 DROPS ORAL
Status: DISCONTINUED | OUTPATIENT
Start: 2019-04-06 | End: 2019-04-09 | Stop reason: HOSPADM

## 2019-04-06 RX ORDER — FLUMAZENIL 0.1 MG/ML
0.2 INJECTION INTRAVENOUS
Status: DISPENSED | OUTPATIENT
Start: 2019-04-06 | End: 2019-04-06

## 2019-04-06 RX ORDER — EPINEPHRINE 0.1 MG/ML
1 INJECTION INTRACARDIAC; INTRAVENOUS
Status: ACTIVE | OUTPATIENT
Start: 2019-04-06 | End: 2019-04-07

## 2019-04-06 RX ADMIN — SODIUM CHLORIDE 40 MG: 9 INJECTION INTRAMUSCULAR; INTRAVENOUS; SUBCUTANEOUS at 08:28

## 2019-04-06 RX ADMIN — SODIUM CHLORIDE 500 ML: 900 INJECTION, SOLUTION INTRAVENOUS at 12:43

## 2019-04-06 RX ADMIN — MIDAZOLAM 2 MG: 5 INJECTION INTRAMUSCULAR; INTRAVENOUS at 09:57

## 2019-04-06 RX ADMIN — MIDAZOLAM HYDROCHLORIDE 1 MG: 5 INJECTION INTRAMUSCULAR; INTRAVENOUS at 10:05

## 2019-04-06 RX ADMIN — LEVOTHYROXINE SODIUM 50 MCG: 50 TABLET ORAL at 08:28

## 2019-04-06 RX ADMIN — SODIUM CHLORIDE 75 ML/HR: 450 INJECTION, SOLUTION INTRAVENOUS at 08:36

## 2019-04-06 RX ADMIN — FENTANYL CITRATE 50 MCG: 50 INJECTION, SOLUTION INTRAMUSCULAR; INTRAVENOUS at 09:57

## 2019-04-06 RX ADMIN — Medication 10 ML: at 05:46

## 2019-04-06 RX ADMIN — DULOXETINE HYDROCHLORIDE 120 MG: 30 CAPSULE, DELAYED RELEASE ORAL at 08:27

## 2019-04-06 RX ADMIN — FENTANYL CITRATE 50 MCG: 50 INJECTION, SOLUTION INTRAMUSCULAR; INTRAVENOUS at 10:01

## 2019-04-06 RX ADMIN — MIDAZOLAM HYDROCHLORIDE 2 MG: 5 INJECTION INTRAMUSCULAR; INTRAVENOUS at 10:01

## 2019-04-06 RX ADMIN — SODIUM CHLORIDE 40 MG: 9 INJECTION INTRAMUSCULAR; INTRAVENOUS; SUBCUTANEOUS at 20:35

## 2019-04-06 RX ADMIN — DIPHENHYDRAMINE HYDROCHLORIDE 25 MG: 50 INJECTION, SOLUTION INTRAMUSCULAR; INTRAVENOUS at 10:04

## 2019-04-06 NOTE — CONSULTS
[address] [place] [address]. Renate Raya M.D.  [phone]                    GASTROENTEROLOGY CONSULTATION NOTE              NAME:  Cleo Godinez   :   1965   MRN:   [id number]       Referring Physician:   Dr. Hasmukh Marie Date:   2019 9:08 AM    Chief Complaint:    Melena and anemia     History of Present Illness:    Ms. Reji Deleon is a 52 y.o. female who presented to the ED with a 1 week onset of black tarry stools and worsening SOB. States she has progressively gotten weaker and harder to breath. Unable to do any yard work. Denies any NSAID use  She does continue to drink beer -- states has slowed down and drinks only a beer every other day, but at the same time states she needs help in stopping alcohol use. She has a hx of a bleeding duodenal ulcer for which she was admitted in May 2018 and an EGD done then showed 1 ulcer(s) 8 mm in size located in [place] sweep for which she had an injection of 9 cc of epinephrine and heater probe therapy. She has not been consistent in taking PPIs. Her Hgb was found to be 5.2 g/dL -- however has responded appropriately to transfusion and now 7.6gms/dl. GI has been consulted for evaluation and management of her acute anemia            PMH:  Past Medical History:   Diagnosis Date    Anemia     Asthma     Depression     Hypothyroid     Kidney stones 1988    Pancreatitis     7 years ago    Psychiatric disorder     Seizures ([facility] [address]        PSH:  Past Surgical History:   Procedure Laterality Date    COLONOSCOPY  2014         EGD  2014         HX CHOLECYSTECTOMY      HX UROLOGICAL      kidney stone removed    UPPER GI ENDOSCOPY,BIOPSY  2015            Allergies: Allergies   Allergen Reactions    Pcn [Penicillins] Hives       Home Medications:  Prior to Admission Medications   Prescriptions Last Dose Informant Patient Reported? Taking?    DULoxetine (CYMBALTA) 60 mg capsule 2019 at AM Self Yes Yes   Sig: Take 120 mg by mouth daily. Omeprazole delayed release (PRILOSEC D/R) 20 mg tablet   Yes Yes   Sig: Take 20 mg by mouth daily as needed. adalimumab (HUMIRA) 40 mg/0.8 mL injection  Self Yes Yes   Si mg by SubCUTAneous route Once every 2 weeks. Indications: MODERATE TO SEVERE PLAQUE PSORIASIS   albuterol (PROAIR HFA) 90 mcg/actuation inhaler   Yes Yes   Sig: Take 2 Puffs by inhalation every four (4) hours as needed for Wheezing. ascorbate calcium (VITAMIN C PO) 2019 at AM  Yes Yes   Sig: Take 1 Tab by mouth daily as needed. butalbital-acetaminophen-caff (FIORICET) -40 mg per capsule   No Yes   Sig: Take 1 Cap by mouth every four (4) hours as needed for Pain. chlordiazePOXIDE (LIBRIUM) 5 mg capsule   Yes Yes   Sig: Take 10 mg by mouth three (3) times daily as needed for Anxiety. cyanocobalamin 1,000 mcg tablet  Self Yes Yes   Sig: Take 1,000 mcg by mouth daily. ferrous sulfate 325 mg (65 mg iron) tablet 2019 at AM Self Yes Yes   Sig: Take 325 mg by mouth Daily (before breakfast). levothyroxine (SYNTHROID) 50 mcg tablet 2019 at AM  No Yes   Sig: Take 1 Tab by mouth Daily (before breakfast). magnesium oxide (MAG-OX) 400 mg tablet 2019 at AM Self Yes Yes   Sig: Take 400 mg by mouth daily. multivit-min-iron-FA-lutein (CENTRUM SILVER WOMEN) 8 mg iron-400 mcg-300 mcg tab   No Yes   Sig: Take 1 Tab by mouth daily. potassium chloride (KLOR-CON) 10 mEq tablet 2019 at AM  No Yes   Sig: Take 1 Tab by mouth daily.       Facility-Administered Medications: None       Hospital Medications:  Current Facility-Administered Medications   Medication Dose Route Frequency    0.45% sodium chloride infusion  75 mL/hr IntraVENous CONTINUOUS    sodium chloride (NS) flush 5-40 mL  5-40 mL IntraVENous Q8H    sodium chloride (NS) flush 5-40 mL  5-40 mL IntraVENous PRN    acetaminophen (TYLENOL) tablet 650 mg  650 mg Oral Q4H PRN    naloxone (NARCAN) injection 0.4 mg  0.4 mg IntraVENous PRN    ondansetron (ZOFRAN) injection 4 mg  4 mg IntraVENous Q4H PRN    pantoprazole (PROTONIX) 40 mg in sodium chloride 0.9% 10 mL injection  40 mg IntraVENous Q12H    chlordiazePOXIDE (LIBRIUM) capsule 10 mg  10 mg Oral TID PRN    DULoxetine (CYMBALTA) capsule 120 mg  120 mg Oral DAILY    levothyroxine (SYNTHROID) tablet 50 mcg  50 mcg Oral ACB       Social History:  Social History     Tobacco Use    Smoking status: Never Smoker    Smokeless tobacco: Never Used   Substance Use Topics    Alcohol use: Yes     Alcohol/week: 1.8 oz     Types: 3 Cans of beer per week     Comment: 2-3 beers every other day. Family History:  Family History   Problem Relation Age of Onset    Cancer Neg Hx     Diabetes Neg Hx        Review of Systems:  A complete 12-point ROS was obtained and is as per the above HPI otherwise negative      Objective:     Patient Vitals for the past 8 hrs:   BP Temp Pulse Resp SpO2   04/06/19 0700 109/61  94 16 100 %   04/06/19 0656   90     04/06/19 0630 97/64  87 18 99 %   04/06/19 0600 101/71  87 15 100 %   04/06/19 0530 100/51  97 13 100 %   04/06/19 0500 102/53  94 20 98 %   04/06/19 0430 93/57  91 16 100 %   04/06/19 0410 94/54 98.6 °F (37 °C) 97 19 97 %   04/06/19 0400 (!) 83/52  90 18 99 %   04/06/19 0330 96/53  90 14 99 %   04/06/19 0300 98/53  91 13 100 %   04/06/19 0230 92/54  93 16 100 %   04/06/19 0200 114/64  92 13 100 %   04/06/19 0145 114/64 98.9 °F (37.2 °C) 99 16 100 %   04/06/19 0130 91/43  90 16 100 %     No intake/output data recorded. 04/04 1901 - 04/06 0700  In: 721   Out: -     EXAM:     NEURO-alert, oriented x3, affect appropriate, no focal neurological deficits, moves all extremities well, no involuntary movements, reflexes at knee and ankle intact   HEENT-Eye: Normal external eye, conjunctiva, lids cornea, LOPEZ. LUNGS-clear to auscultation bilaterally    COR-regular rate and rhythym     ABD- soft, non-tender.  Bowel sounds normal. No masses,  no organomegaly     EXT-no edema    Skin - No rash     Data Review     Recent Labs     04/06/19  0845 04/06/19  0146 04/05/19 1913   WBC  --   --  11.3*   HGB 7.6* 7.6* 5.2*   HCT 23.0* 22.4* 15.8*   PLT  --   --  142*     Recent Labs     04/06/19  0146 04/05/19  1830    133*   K 3.5 4.5   * 104   CO2 23 18*   BUN 12 19   CREA 0.53* 0.74   GLU 94 141*   CA 7.9* 8.7     Recent Labs     04/06/19  0146 04/05/19  1830   SGOT 19 34   AP 73 92   TP 5.3* 6.2*   ALB 2.9* 3.0*   GLOB 2.4 3.2   LPSE  --  95     Recent Labs     04/05/19 1913   INR 1.1   PTP 11.4*       Patient Active Problem List   Diagnosis Code    Hypothyroidism E03.9    Depression F32.9    Microcytic anemia D50.9    SOB (shortness of breath) R06.02    Alcohol abuse F10.10    GI bleed K92.2    Acute blood loss anemia D62    Acute GI bleeding K92.2    Gastric ulcer K25.9    SIRS (systemic inflammatory response syndrome) (HCC) R65.10    Duodenal ulcer K26.9    Melena K92.1       Assessment and Plan:  · Likely this is due to PUD vs gastritis from ongoing alcohol use. · Agree with PPI IV at this time  · Continue to keep her NPO  · Will plan to proceed with an urgent EGD today for evaluation and treatment for any UGI source of blood loss. · Will have further recommendations to follow the endoscopy       Thanks for allowing me to participate in the care of this patient.   Signed By: Luis Angel Alexis MD     4/6/2019  9:08 AM

## 2019-04-06 NOTE — CONSULTS
[facility]     Name: Adrián Mooney MRN: [id number]   : 1965 Hospital: [address]   Date: 2019        Impression Plan   1. Acute GI bleed  2. Anemia  3. Cough  4. Leukocytosis- likely reactive  5. Hyponatremia  6. Hypothyroidism   7. Hx of duodenal ulcer  8. · GI to see this AM  · Serial hemoglobins q8h  · Lactic acid  · PPI  · Synthroid  · Gentle fluids  · CXR for cough  · SCDs, no anticoagulation           Radiology  ( personally reviewed) No chest imaging available   ABG No results for input(s): PHI, PO2I, PCO2I in the last 72 hours. Subjective     Cc: dark stools    51 yo presenting to ED with complaints of black tarry stools for one weak, MURILLO and fatigue. Last BM was last night. Hgb initially 5.2, now 7.6 after 2 units. Hemodynamically stable. Has also had a cough for approx 1 week that she attritbutes to allergies. No fevers/chills. Denies NSAID use. States she drinks 1 beer every other day, last one 5 days ago. Review of Systems:  A comprehensive review of systems was negative except for that written in the HPI. Past Medical History:   Diagnosis Date    Anemia     Asthma     Depression     Hypothyroid     Kidney stones 1988    Pancreatitis     7 years ago    Psychiatric disorder     Seizures ([facility] [address]       Past Surgical History:   Procedure Laterality Date    COLONOSCOPY  2014         EGD  2014         HX CHOLECYSTECTOMY      HX UROLOGICAL      kidney stone removed    UPPER GI ENDOSCOPY,BIOPSY  2015           Prior to Admission medications    Medication Sig Start Date End Date Taking? Authorizing Provider   Omeprazole delayed release (PRILOSEC D/R) 20 mg tablet Take 20 mg by mouth daily as needed. Yes Provider, Historical   albuterol (PROAIR HFA) 90 mcg/actuation inhaler Take 2 Puffs by inhalation every four (4) hours as needed for Wheezing.    Yes Provider, Historical   ascorbate calcium (VITAMIN C PO) Take 1 Tab by mouth daily as needed. Yes Provider, Historical   chlordiazePOXIDE (LIBRIUM) 5 mg capsule Take 10 mg by mouth three (3) times daily as needed for Anxiety. Yes Provider, Historical   levothyroxine (SYNTHROID) 50 mcg tablet Take 1 Tab by mouth Daily (before breakfast). 5/12/18  Yes Shaq Engel MD   multivit-min-iron-FA-lutein (CENTRUM SILVER WOMEN) 8 mg iron-400 mcg-300 mcg tab Take 1 Tab by mouth daily. 5/11/18  Yes Shaq Engel MD   butalbital-acetaminophen-caff (FIORICET) -40 mg per capsule Take 1 Cap by mouth every four (4) hours as needed for Pain. 5/11/18  Yes Shaq Engel MD   potassium chloride (KLOR-CON) 10 mEq tablet Take 1 Tab by mouth daily. 5/11/18  Yes Shaq Engel MD   adalimumab (HUMIRA) 40 mg/0.8 mL injection 40 mg by SubCUTAneous route Once every 2 weeks. Indications: MODERATE TO SEVERE PLAQUE PSORIASIS   Yes Provider, Historical   ferrous sulfate 325 mg (65 mg iron) tablet Take 325 mg by mouth Daily (before breakfast). Yes Provider, Historical   cyanocobalamin 1,000 mcg tablet Take 1,000 mcg by mouth daily. Yes Provider, Historical   magnesium oxide (MAG-OX) 400 mg tablet Take 400 mg by mouth daily. Yes Provider, Historical   DULoxetine (CYMBALTA) 60 mg capsule Take 120 mg by mouth daily. Yes Provider, Historical     Current Facility-Administered Medications   Medication Dose Route Frequency    sodium chloride (NS) flush 5-40 mL  5-40 mL IntraVENous Q8H    pantoprazole (PROTONIX) 40 mg in sodium chloride 0.9% 10 mL injection  40 mg IntraVENous Q12H    DULoxetine (CYMBALTA) capsule 120 mg  120 mg Oral DAILY    levothyroxine (SYNTHROID) tablet 50 mcg  50 mcg Oral ACB     Allergies   Allergen Reactions    Pcn [Penicillins] Hives      Social History     Tobacco Use    Smoking status: Never Smoker    Smokeless tobacco: Never Used   Substance Use Topics    Alcohol use:  Yes     Alcohol/week: 1.8 oz     Types: 3 Cans of beer per week Comment: 2-3 beers every other day. Family History   Problem Relation Age of Onset    Cancer Neg Hx     Diabetes Neg Hx           Laboratory: I have personally reviewed the critical care flowsheet and labs. Recent Labs     04/06/19 0146 04/05/19 1913   WBC  --  11.3*   HGB 7.6* 5.2*   HCT 22.4* 15.8*   PLT  --  142*     Recent Labs     04/06/19 0146 04/05/19 1913 04/05/19  1830     --  133*   K 3.5  --  4.5   *  --  104   CO2 23  --  18*   GLU 94  --  141*   BUN 12  --  19   CREA 0.53*  --  0.74   CA 7.9*  --  8.7   ALB 2.9*  --  3.0*   SGOT 19  --  34   ALT 23  --  27   INR  --  1.1  --        Objective:     Mode Rate Tidal Volume Pressure FiO2 PEEP                    Vital Signs:     TMAX(24)      Intake/Output:   Last shift:         Last 3 shifts: No intake/output data recorded. [id number]    Intake/Output Summary (Last 24 hours) at 4/6/2019 0748  Last data filed at 4/6/2019 0045  Gross per 24 hour   Intake 721 ml   Output    Net 721 ml     EXAM:   GENERAL: well developed, pale HEENT: poor dentition, PERRL, EOMI, no alar flaring or epistaxis, oral mucosa moist without cyanosis, NECK:  no jugular vein distention, no retractions, no thyromegaly or masses, LUNGS: CTA, no w/r/r , HEART:  Regular rate and rhythm with no MGR; no edema is present, ABDOMEN:  soft with no tenderness, bowel sounds present, EXTREMITIES:  warm with no cyanosis, SKIN:  no jaundice or ecchymosis and NEUROLOGIC:  alert and oriented, grossly non-focal    Lexy Jeff MD  [facility]

## 2019-04-06 NOTE — PROCEDURES
Maykel Duffy M.D.  [phone]           2019                EGD Operative Report  Job Solo  :  1965  Hermilo Duron Medical Record Number:  [id number]      Indication: Melena     : Mirna Bundy MD    Assistant -- None  Implants -- None    Referring Provider:  Alen Nichols MD      Anesthesia/Sedation:  Versed - 5mg                               Fentanyl - 100mcg                               Benadryl - 25mg    Airway assessment: No airway problems anticipated    Pre-Procedural Exam:      Airway: clear, no airway problems anticipated  Heart: RRR, without gallops or rubs  Lungs: clear bilaterally without wheezes, crackles, or rhonchi  Abdomen: soft, nontender, nondistended, bowel sounds present  Mental Status: awake, alert and oriented to person, place and time       Procedure Details     After infomed consent was obtained for the procedure, with all risks and benefits of procedure explained the patient was taken to the endoscopy suite and placed in the left lateral decubitus position. Following sequential administration of sedation as per above, the endoscope was inserted into the mouth and advanced under direct vision to second portion of the duodenum. A careful inspection was made as the gastroscope was withdrawn, including a retroflexed view of the proximal stomach; findings and interventions are described below. Findings:   Esophagus:normal  Stomach: normal   Duodenum/jejunum: A clean based ulcer noted at the 1st portion of the duodenum. No active bleeding was noted. This is the likely source of blood loss. since no active bleeding was noted, no intervention was performed    Therapies:  none    Specimens: none           Complications:   None; patient tolerated the procedure well. EBL:  None. Impression:    A clean based and cratered ulcer was noted at the 1st portion of the duodenum.  No active bleeding was noted at the time of the endoscopy that required any intervention.  This is the likely cause of blood loss  Interestingly oozing was noted from her molar tooth on exam. This might need to be addressed     Recommendations:    -Acid suppression with a proton pump inhibitor.  -Monitor H&H closely  -Okay to advance diet as tolerated    Brittany Shelton MD

## 2019-04-06 NOTE — PERIOP NOTES
Patient tolerated procedure without problems. Abdomen soft and patient arousable and voices no complaints. Conscious sedation administered per MD oRDERS. SBAR report given to bedside RN, Unruly Pardo. TRANSFER - OUT REPORT: 
 
Verbal report given to [employer]) on [employer] . Report consisted of patients Situation, Background, Assessment and  
Recommendations(SBAR). Information from the following report(s) Procedure Summary was reviewed with the receiving nurse. Lines:  
Peripheral IV 04/05/19 Right Hand (Active) Site Assessment Clean, dry, & intact 4/6/2019  8:00 AM  
Phlebitis Assessment 0 4/6/2019  8:00 AM  
Infiltration Assessment 0 4/6/2019  8:00 AM  
Dressing Status Clean, dry, & intact 4/6/2019  8:00 AM  
Dressing Type Transparent 4/6/2019  8:00 AM  
Hub Color/Line Status Blue;Flushed;Capped 4/6/2019  8:00 AM  
Action Taken Open ports on tubing capped 4/6/2019  8:00 AM  
Alcohol Cap Used Yes 4/6/2019  8:00 AM  
   
Peripheral IV 04/05/19 Left Wrist (Active) Site Assessment Clean, dry, & intact 4/6/2019  8:00 AM  
Phlebitis Assessment 0 4/6/2019  8:00 AM  
Infiltration Assessment 0 4/6/2019  8:00 AM  
Dressing Status Clean, dry, & intact 4/6/2019  8:00 AM  
Dressing Type Transparent 4/6/2019  8:00 AM  
Hub Color/Line Status Pink;Flushed;Capped 4/6/2019  8:00 AM  
Action Taken Open ports on tubing capped 4/6/2019  8:00 AM  
Alcohol Cap Used Yes 4/6/2019  8:00 AM  
  
 
Opportunity for questions and clarification was provided.

## 2019-04-06 NOTE — PROGRESS NOTES
0700-Bedside shift change report given to Pauline (oncoming nurse) by [id number] [facility] (offgoing nurse). Report included the following information SBAR, ED Summary, Intake/Output, MAR, Recent Results, Cardiac Rhythm NSR and Alarm Parameters . 0900-Dr Tucker at bedside with plan to do EGD. 
 
0945-Endo RN's at bedside to set up for EGD. 1030-EGD complete. Patient awake but drowsy. 1130-Patient slightly hypotensive, 80's/50's. 250 NS bolus given over 30 min. 1230-Patient continues to be hypotensive. Dr Cagle Factor paged for orders. 500ml NS bolus given.

## 2019-04-06 NOTE — ACP (ADVANCE CARE PLANNING)
requested per In Basket for Advance Directive (AMD) consult. Patient states she has been considering an AMD and requested information concerning AMD.  She was given a blank copy of AMD and a copy of the booklet, \"Your Right to Decide. \"  She did not have any questions and said that she would ask a nurse to contact a  or the pastoral care office should she desire further assistance. She expressed gratitude for this visit. Visited by Rev. Bakari Contreras, [address] [place] [address] Road paging service: [phone])

## 2019-04-06 NOTE — PROGRESS NOTES
Spiritual Care Assessment/Progress Note [address] 
 
 
NAME: Radha Thornton      MRN: [id number] AGE: 52 y.o. SEX: female Anabaptist Affiliation: [facility]  
Language: [place] 4/6/2019     Total Time (in minutes): 13 Spiritual Assessment begun in [facility] 3 INTENSIVE CARE through conversation with: 
  
    [x]Patient        [] Family    [] Friend(s) Reason for Consult: Initial/Spiritual assessment, patient floor, Advance medical directive consult Spiritual beliefs: (Please include comment if needed) [x] Identifies with a johnathan tradition: Restorationist    
   [] Supported by a johnathan community:        
   [] Claims no spiritual orientation:       
   [] Seeking spiritual identity:            
   [] Adheres to an individual form of spirituality:       
   [] Not able to assess:                   
 
    
Identified resources for coping:  
   [] Prayer                           
   [] Music                  [] Guided Imagery [x] Family/friends                 [] Pet visits [] Devotional reading                         [] Unknown 
   [] Other:                                      
 
 
Interventions offered during this visit: (See comments for more details) Patient Interventions: Advance medical directive consult, Affirmation of emotions/emotional suffering, Affirmation of johnathan, Catharsis/review of pertinent events in supportive environment, Coping skills reviewed/reinforced, Iconic (affirming the presence of God/Higher Power) Plan of Care: 
 
 [] Support spiritual and/or cultural needs [x] Support AMD and/or advance care planning process    
 [] Support grieving process 
 [] Coordinate Rites and/or Rituals  
 [] Coordination with community clergy [] No spiritual needs identified at this time 
 [] Detailed Plan of Care below (See Comments)  [] Make referral to Music Therapy 
[] Make referral to Pet Therapy    
[] Make referral to Addiction services [] Make referral to [facility] 
[] Make referral to Spiritual Care Partner 
[] No future visits requested       
[x] Follow up visits as needed Comments:  visit for initial spiritual assessment and for Advance Directive (AMD) consult. Patient sitting up in bed, just finished ordering a meal from dining via telephone. Good eye contact, polite. Says she is feeling okay, but her stomach is still a little upset from a procedure she had earlier. Provided spiritual presence and listening as she spoke briefly about her present thougths, feelings, and concerns. Patient states she has been considering an AMD and requested information concerning AMD.  She was given a blank copy of AMD and a copy of the booklet, \"Your Right to Decide. \"  She did not have any questions and said that she would ask a nurse to contact a  or the pastoral care office should she desire further assistance. She expressed gratitude for this visit. Visited by Rev. Nga Macario, [facility]  paging service: [phone])

## 2019-04-06 NOTE — ED NOTES
TRANSFER - OUT REPORT: 
 
Verbal report given to [facility] (name) on Maureen Baker  being transferred to ICU(unit) for routine progression of care Report consisted of patients Situation, Background, Assessment and  
Recommendations(SBAR). Information from the following report(s) SBAR, ED Summary and MAR was reviewed with the receiving nurse. Lines:    
 
Opportunity for questions and clarification was provided. Patient transported with: 
 Registered Nurse

## 2019-04-07 LAB
ANION GAP SERPL CALC-SCNC: 7 MMOL/L (ref 5–15)
ATRIAL RATE: 116 BPM
BUN SERPL-MCNC: 5 MG/DL (ref 6–20)
BUN/CREAT SERPL: 10 (ref 12–20)
CALCIUM SERPL-MCNC: 7.4 MG/DL (ref 8.5–10.1)
CALCULATED R AXIS, ECG10: 16 DEGREES
CALCULATED T AXIS, ECG11: 47 DEGREES
CHLORIDE SERPL-SCNC: 104 MMOL/L (ref 97–108)
CO2 SERPL-SCNC: 23 MMOL/L (ref 21–32)
COMMENT, HOLDF: NORMAL
CREAT SERPL-MCNC: 0.5 MG/DL (ref 0.55–1.02)
DIAGNOSIS, 93000: NORMAL
GLUCOSE SERPL-MCNC: 80 MG/DL (ref 65–100)
HCT VFR BLD AUTO: 18.5 % (ref 35–47)
HCT VFR BLD AUTO: 27.2 % (ref 35–47)
HGB BLD-MCNC: 10.8 G/DL (ref 11.5–16)
HGB BLD-MCNC: 6.2 G/DL (ref 11.5–16)
HGB BLD-MCNC: 9.2 G/DL (ref 11.5–16)
P-R INTERVAL, ECG05: 112 MS
POTASSIUM SERPL-SCNC: 3.2 MMOL/L (ref 3.5–5.1)
Q-T INTERVAL, ECG07: 446 MS
QRS DURATION, ECG06: 66 MS
QTC CALCULATION (BEZET), ECG08: 619 MS
SAMPLES BEING HELD,HOLD: NORMAL
SODIUM SERPL-SCNC: 134 MMOL/L (ref 136–145)
VENTRICULAR RATE, ECG03: 116 BPM

## 2019-04-07 PROCEDURE — 74011250637 HC RX REV CODE- 250/637: Performed by: INTERNAL MEDICINE

## 2019-04-07 PROCEDURE — 74011000258 HC RX REV CODE- 258: Performed by: INTERNAL MEDICINE

## 2019-04-07 PROCEDURE — 65660000000 HC RM CCU STEPDOWN

## 2019-04-07 PROCEDURE — P9016 RBC LEUKOCYTES REDUCED: HCPCS

## 2019-04-07 PROCEDURE — 74011250636 HC RX REV CODE- 250/636: Performed by: INTERNAL MEDICINE

## 2019-04-07 PROCEDURE — 80048 BASIC METABOLIC PNL TOTAL CA: CPT

## 2019-04-07 PROCEDURE — C9113 INJ PANTOPRAZOLE SODIUM, VIA: HCPCS | Performed by: INTERNAL MEDICINE

## 2019-04-07 PROCEDURE — 85018 HEMOGLOBIN: CPT

## 2019-04-07 PROCEDURE — 36430 TRANSFUSION BLD/BLD COMPNT: CPT

## 2019-04-07 PROCEDURE — 74011250637 HC RX REV CODE- 250/637: Performed by: FAMILY MEDICINE

## 2019-04-07 PROCEDURE — 36415 COLL VENOUS BLD VENIPUNCTURE: CPT

## 2019-04-07 RX ORDER — POTASSIUM CHLORIDE 750 MG/1
10 TABLET, FILM COATED, EXTENDED RELEASE ORAL 2 TIMES DAILY
Status: DISCONTINUED | OUTPATIENT
Start: 2019-04-07 | End: 2019-04-08

## 2019-04-07 RX ADMIN — SODIUM CHLORIDE 75 ML/HR: 450 INJECTION, SOLUTION INTRAVENOUS at 11:00

## 2019-04-07 RX ADMIN — DULOXETINE HYDROCHLORIDE 120 MG: 30 CAPSULE, DELAYED RELEASE ORAL at 08:23

## 2019-04-07 RX ADMIN — SODIUM CHLORIDE 40 MG: 9 INJECTION INTRAMUSCULAR; INTRAVENOUS; SUBCUTANEOUS at 08:24

## 2019-04-07 RX ADMIN — LEVOTHYROXINE SODIUM 50 MCG: 50 TABLET ORAL at 08:23

## 2019-04-07 RX ADMIN — CHLORDIAZEPOXIDE HYDROCHLORIDE 10 MG: 5 CAPSULE ORAL at 17:57

## 2019-04-07 RX ADMIN — POTASSIUM CHLORIDE 10 MEQ: 750 TABLET, EXTENDED RELEASE ORAL at 11:56

## 2019-04-07 RX ADMIN — Medication 10 ML: at 13:22

## 2019-04-07 RX ADMIN — SODIUM CHLORIDE 40 MG: 9 INJECTION INTRAMUSCULAR; INTRAVENOUS; SUBCUTANEOUS at 21:06

## 2019-04-07 RX ADMIN — POTASSIUM CHLORIDE 10 MEQ: 750 TABLET, EXTENDED RELEASE ORAL at 17:57

## 2019-04-07 RX ADMIN — Medication 10 ML: at 21:09

## 2019-04-07 NOTE — PROGRESS NOTES
1140: RN attempted to call for report. ICU RN not available, waiting for call back. 1200: TRANSFER - IN REPORT: 
 
Verbal report received from [facility] (name) on Teresa Richards  being received from ICU (unit) for routine progression of care Report consisted of patients Situation, Background, Assessment and  
Recommendations(SBAR). Information from the following report(s) SBAR, Kardex, Intake/Output, MAR, Accordion and Recent Results was reviewed with the receiving nurse. Opportunity for questions and clarification was provided. Assessment completed upon patients arrival to unit and care assumed.

## 2019-04-07 NOTE — PROGRESS NOTES
Stephanie Heaton M.[phone] GI PROGRESS NOTE 
 
 
NAME: Barak Fisher :  1965 MRN:  [id number] Subjective:  
Doing well Denies any abdominal pain or any further melena Objective: VITALS:  
Last 24hrs VS reviewed since prior progress note. Most recent are: 
Visit Vitals BP (!) 124/100 Pulse 94 Temp 98 °F (36.7 °C) Resp 21 Ht 4' 10\" (1.473 m) Wt 53.8 kg (118 lb 9.7 oz) SpO2 100% BMI 24.79 kg/m² Intake/Output Summary (Last 24 hours) at 2019 1210 Last data filed at 2019 1100 Gross per 24 hour Intake 3026.3 ml Output  Net 3026.3 ml PHYSICAL EXAM: 
General: Alert, in no acute distress HEENT: Anicteric sclerae. Lungs:            CTA Bilaterally. Heart:  Regular  rhythm, Abdomen: Soft, Non distended, Non tender.  (+)Bowel sounds, no HSM Extremities: No c/c/e Neurologic:  CN 2-12 gi, Alert and oriented X 3. No acute neurological distress Psych:   Good insight. Not anxious nor agitated. Lab Data Reviewed:  
Recent Labs 19 
5431 19 
0055  19 
1913 WBC  --   --   --  11.3* HGB 9.2* 6.2*   < > 5.2* HCT 27.2* 18.5*   < > 15.8* PLT  --   --   --  142*  
 < > = values in this interval not displayed. Recent Labs 19 
0055 19 
2911 * 139  
K 3.2* 3.5  110* CO2 23 23 BUN 5* 12  
CREA 0.50* 0.53* GLU 80 94 CA 7.4* 7.9* Recent Labs 19 
0146 19 
1830 SGOT 19 34 AP 73 92  
TP 5.3* 6.2* ALB 2.9* 3.0*  
GLOB 2.4 3.2 LPSE  --  95  
 
 
________________________________________________________________________ Patient Active Problem List  
Diagnosis Code  Hypothyroidism E03.9  Depression F32.9  Microcytic anemia D50.9  SOB (shortness of breath) R06.02  
 Alcohol abuse F10.10  GI bleed K92.2  Acute blood loss anemia D62  Acute GI bleeding K92.2  Gastric ulcer K25.9  SIRS (systemic inflammatory response syndrome) (HCC) R65.10  Duodenal ulcer K26.9  Melena K92.1 Assessment and Plan: 
· hgb better and no further melena · Likely this was due to her duodenal ulcer · Continue PPI therapy as planned for now BID · Advance diet as tolerated · Will monitor labs closely for signs of recurrence of bleeding · Will continue to follow with you Signed By: Giuliana Patterson MD   
 4/7/2019  12:10 PM

## 2019-04-07 NOTE — PROGRESS NOTES
0700 
Bedside and Verbal shift change report given to Rowdy Werner  (oncoming nurse) by [facility] RN  (offgoing nurse). Report included the following information SBAR, MAR and Recent Results. 0800 Pt assessed, VSS.  
 
1135 
5th floor charge RN notified of pending admission to the unit. [address] TRANSFER - OUT REPORT: 
 
Verbal report given to Aldo Chaudhary (name) on Alhaji Trinidad  being transferred to 5th floor (unit) for routine progression of care Report consisted of patients Situation, Background, Assessment and  
Recommendations(SBAR). Information from the following report(s) SBAR, MAR and Recent Results was reviewed with the receiving nurse. Lines:  
Peripheral IV 04/05/19 Right Hand (Active) Site Assessment Clean, dry, & intact 4/7/2019  8:00 AM  
Phlebitis Assessment 0 4/7/2019  8:00 AM  
Infiltration Assessment 0 4/7/2019  8:00 AM  
Dressing Status Clean, dry, & intact 4/7/2019  8:00 AM  
Dressing Type Transparent;Tape 4/7/2019  8:00 AM  
Hub Color/Line Status Pink;Capped 4/7/2019  8:00 AM  
Action Taken Open ports on tubing capped 4/6/2019 11:02 PM  
Alcohol Cap Used Yes 4/7/2019  8:00 AM  
   
Peripheral IV 04/07/19 Anterior; Left Forearm (Active) Site Assessment Clean, dry, & intact 4/7/2019  8:00 AM  
Phlebitis Assessment 0 4/7/2019  8:00 AM  
Infiltration Assessment 0 4/7/2019  8:00 AM  
Dressing Status Clean, dry, & intact 4/7/2019  8:00 AM  
Dressing Type Transparent;Tape 4/7/2019  8:00 AM  
Hub Color/Line Status Blue;Capped 4/7/2019  8:00 AM  
Alcohol Cap Used Yes 4/7/2019  8:00 AM  
  
 
Opportunity for questions and clarification was provided. Patient transported with: 
 Registered Nurse

## 2019-04-07 NOTE — PROGRESS NOTES
Bedside and Verbal shift change report given to Erin Christy (oncoming nurse) by Kvng Zamora (offgoing nurse). Report included the following information SBAR, Kardex, Intake/Output, MAR, Accordion and Recent Results.

## 2019-04-07 NOTE — ROUTINE PROCESS
Bedside and Verbal shift change report given to [facility] (oncoming nurse) by Pauline (offgoing nurse). Report included the following information SBAR, Kardex, Recent Results, Med Rec Status, Cardiac Rhythm SR and Alarm Parameters . 0200- Hemoglobin came back low. Transfusing 1 unit of blood.

## 2019-04-08 LAB
ALBUMIN SERPL-MCNC: 3 G/DL (ref 3.5–5)
ALBUMIN/GLOB SERPL: 1.2 {RATIO} (ref 1.1–2.2)
ALP SERPL-CCNC: 75 U/L (ref 45–117)
ALT SERPL-CCNC: 29 U/L (ref 12–78)
ANION GAP SERPL CALC-SCNC: 3 MMOL/L (ref 5–15)
AST SERPL-CCNC: 35 U/L (ref 15–37)
BASOPHILS # BLD: 0 K/UL (ref 0–0.1)
BASOPHILS # BLD: 0 K/UL (ref 0–0.1)
BASOPHILS NFR BLD: 0 % (ref 0–1)
BASOPHILS NFR BLD: 0 % (ref 0–1)
BILIRUB SERPL-MCNC: 0.4 MG/DL (ref 0.2–1)
BUN SERPL-MCNC: 2 MG/DL (ref 6–20)
BUN/CREAT SERPL: 4 (ref 12–20)
CALCIUM SERPL-MCNC: 8.3 MG/DL (ref 8.5–10.1)
CHLORIDE SERPL-SCNC: 109 MMOL/L (ref 97–108)
CO2 SERPL-SCNC: 26 MMOL/L (ref 21–32)
CREAT SERPL-MCNC: 0.48 MG/DL (ref 0.55–1.02)
DIFFERENTIAL METHOD BLD: ABNORMAL
DIFFERENTIAL METHOD BLD: ABNORMAL
EOSINOPHIL # BLD: 0 K/UL (ref 0–0.4)
EOSINOPHIL # BLD: 0.2 K/UL (ref 0–0.4)
EOSINOPHIL NFR BLD: 0 % (ref 0–7)
EOSINOPHIL NFR BLD: 4 % (ref 0–7)
ERYTHROCYTE [DISTWIDTH] IN BLOOD BY AUTOMATED COUNT: 12.3 % (ref 11.5–14.5)
ERYTHROCYTE [DISTWIDTH] IN BLOOD BY AUTOMATED COUNT: 15.7 % (ref 11.5–14.5)
ERYTHROCYTE [DISTWIDTH] IN BLOOD BY AUTOMATED COUNT: 15.9 % (ref 11.5–14.5)
GLOBULIN SER CALC-MCNC: 2.6 G/DL (ref 2–4)
GLUCOSE SERPL-MCNC: 85 MG/DL (ref 65–100)
HCT VFR BLD AUTO: 15.8 % (ref 35–47)
HCT VFR BLD AUTO: 26.8 % (ref 35–47)
HCT VFR BLD AUTO: 27.6 % (ref 35–47)
HGB BLD-MCNC: 5.2 G/DL (ref 11.5–16)
HGB BLD-MCNC: 9 G/DL (ref 11.5–16)
HGB BLD-MCNC: 9.1 G/DL (ref 11.5–16)
IMM GRANULOCYTES # BLD AUTO: 0 K/UL
IMM GRANULOCYTES # BLD AUTO: 0 K/UL (ref 0–0.04)
IMM GRANULOCYTES NFR BLD AUTO: 0 %
IMM GRANULOCYTES NFR BLD AUTO: 0 % (ref 0–0.5)
LYMPHOCYTES # BLD: 1.1 K/UL (ref 0.8–3.5)
LYMPHOCYTES # BLD: 2.2 K/UL (ref 0.8–3.5)
LYMPHOCYTES NFR BLD: 10 % (ref 12–49)
LYMPHOCYTES NFR BLD: 40 % (ref 12–49)
MAGNESIUM SERPL-MCNC: 2.2 MG/DL (ref 1.6–2.4)
MCH RBC QN AUTO: 31.7 PG (ref 26–34)
MCH RBC QN AUTO: 32.7 PG (ref 26–34)
MCH RBC QN AUTO: 34.7 PG (ref 26–34)
MCHC RBC AUTO-ENTMCNC: 32.6 G/DL (ref 30–36.5)
MCHC RBC AUTO-ENTMCNC: 32.9 G/DL (ref 30–36.5)
MCHC RBC AUTO-ENTMCNC: 34 G/DL (ref 30–36.5)
MCV RBC AUTO: 105.3 FL (ref 80–99)
MCV RBC AUTO: 96.4 FL (ref 80–99)
MCV RBC AUTO: 97.2 FL (ref 80–99)
MONOCYTES # BLD: 0.5 K/UL (ref 0–1)
MONOCYTES # BLD: 0.6 K/UL (ref 0–1)
MONOCYTES NFR BLD: 11 % (ref 5–13)
MONOCYTES NFR BLD: 4 % (ref 5–13)
NEUTS SEG # BLD: 2.5 K/UL (ref 1.8–8)
NEUTS SEG # BLD: 9.7 K/UL (ref 1.8–8)
NEUTS SEG NFR BLD: 46 % (ref 32–75)
NEUTS SEG NFR BLD: 86 % (ref 32–75)
NRBC # BLD: 0 K/UL (ref 0–0.01)
NRBC BLD-RTO: 0 PER 100 WBC
PATH REV BLD -IMP: ABNORMAL
PLATELET # BLD AUTO: 130 K/UL (ref 150–400)
PLATELET # BLD AUTO: 142 K/UL (ref 150–400)
PLATELET # BLD AUTO: 156 K/UL (ref 150–400)
PMV BLD AUTO: 10.1 FL (ref 8.9–12.9)
PMV BLD AUTO: 10.2 FL (ref 8.9–12.9)
PMV BLD AUTO: 10.4 FL (ref 8.9–12.9)
POTASSIUM SERPL-SCNC: 3.1 MMOL/L (ref 3.5–5.1)
PROT SERPL-MCNC: 5.6 G/DL (ref 6.4–8.2)
RBC # BLD AUTO: 1.5 M/UL (ref 3.8–5.2)
RBC # BLD AUTO: 2.78 M/UL (ref 3.8–5.2)
RBC # BLD AUTO: 2.84 M/UL (ref 3.8–5.2)
RBC MORPH BLD: ABNORMAL
SODIUM SERPL-SCNC: 138 MMOL/L (ref 136–145)
WBC # BLD AUTO: 11.3 K/UL (ref 3.6–11)
WBC # BLD AUTO: 5.5 K/UL (ref 3.6–11)
WBC # BLD AUTO: 5.9 K/UL (ref 3.6–11)

## 2019-04-08 PROCEDURE — 94760 N-INVAS EAR/PLS OXIMETRY 1: CPT

## 2019-04-08 PROCEDURE — 74011250636 HC RX REV CODE- 250/636: Performed by: INTERNAL MEDICINE

## 2019-04-08 PROCEDURE — C9113 INJ PANTOPRAZOLE SODIUM, VIA: HCPCS | Performed by: INTERNAL MEDICINE

## 2019-04-08 PROCEDURE — 80053 COMPREHEN METABOLIC PANEL: CPT

## 2019-04-08 PROCEDURE — 74011250637 HC RX REV CODE- 250/637: Performed by: NURSE PRACTITIONER

## 2019-04-08 PROCEDURE — 74011250637 HC RX REV CODE- 250/637: Performed by: INTERNAL MEDICINE

## 2019-04-08 PROCEDURE — 74011000258 HC RX REV CODE- 258: Performed by: INTERNAL MEDICINE

## 2019-04-08 PROCEDURE — 85025 COMPLETE CBC W/AUTO DIFF WBC: CPT

## 2019-04-08 PROCEDURE — 65660000000 HC RM CCU STEPDOWN

## 2019-04-08 PROCEDURE — 74011000250 HC RX REV CODE- 250: Performed by: INTERNAL MEDICINE

## 2019-04-08 PROCEDURE — 36415 COLL VENOUS BLD VENIPUNCTURE: CPT

## 2019-04-08 PROCEDURE — 85027 COMPLETE CBC AUTOMATED: CPT

## 2019-04-08 PROCEDURE — 83735 ASSAY OF MAGNESIUM: CPT

## 2019-04-08 PROCEDURE — 74011250637 HC RX REV CODE- 250/637: Performed by: FAMILY MEDICINE

## 2019-04-08 RX ORDER — FLUTICASONE PROPIONATE 50 MCG
2 SPRAY, SUSPENSION (ML) NASAL DAILY
Status: DISCONTINUED | OUTPATIENT
Start: 2019-04-09 | End: 2019-04-09 | Stop reason: HOSPADM

## 2019-04-08 RX ORDER — SUCRALFATE 1 G/1
1 TABLET ORAL
Status: DISCONTINUED | OUTPATIENT
Start: 2019-04-08 | End: 2019-04-09 | Stop reason: HOSPADM

## 2019-04-08 RX ORDER — POTASSIUM CHLORIDE 750 MG/1
20 TABLET, FILM COATED, EXTENDED RELEASE ORAL 2 TIMES DAILY
Status: DISCONTINUED | OUTPATIENT
Start: 2019-04-08 | End: 2019-04-09 | Stop reason: HOSPADM

## 2019-04-08 RX ORDER — DIPHENHYDRAMINE HCL 25 MG
25 CAPSULE ORAL
Status: DISCONTINUED | OUTPATIENT
Start: 2019-04-08 | End: 2019-04-09 | Stop reason: HOSPADM

## 2019-04-08 RX ORDER — ACETAMINOPHEN 500 MG
500 TABLET ORAL
Status: DISCONTINUED | OUTPATIENT
Start: 2019-04-08 | End: 2019-04-09 | Stop reason: HOSPADM

## 2019-04-08 RX ADMIN — ACETAMINOPHEN 650 MG: 325 TABLET ORAL at 08:35

## 2019-04-08 RX ADMIN — SUCRALFATE 1 G: 1 TABLET ORAL at 16:22

## 2019-04-08 RX ADMIN — ACETAMINOPHEN 500 MG: 500 TABLET ORAL at 22:02

## 2019-04-08 RX ADMIN — SUCRALFATE 1 G: 1 TABLET ORAL at 21:05

## 2019-04-08 RX ADMIN — POTASSIUM CHLORIDE 20 MEQ: 750 TABLET, EXTENDED RELEASE ORAL at 17:47

## 2019-04-08 RX ADMIN — DULOXETINE HYDROCHLORIDE 120 MG: 30 CAPSULE, DELAYED RELEASE ORAL at 08:35

## 2019-04-08 RX ADMIN — SODIUM CHLORIDE 75 ML/HR: 450 INJECTION, SOLUTION INTRAVENOUS at 13:10

## 2019-04-08 RX ADMIN — SODIUM CHLORIDE 40 MG: 9 INJECTION INTRAMUSCULAR; INTRAVENOUS; SUBCUTANEOUS at 21:06

## 2019-04-08 RX ADMIN — SUCRALFATE 1 G: 1 TABLET ORAL at 11:25

## 2019-04-08 RX ADMIN — POTASSIUM CHLORIDE 20 MEQ: 750 TABLET, EXTENDED RELEASE ORAL at 08:35

## 2019-04-08 RX ADMIN — Medication 10 ML: at 14:25

## 2019-04-08 RX ADMIN — SODIUM CHLORIDE 40 MG: 9 INJECTION INTRAMUSCULAR; INTRAVENOUS; SUBCUTANEOUS at 08:35

## 2019-04-08 RX ADMIN — ACETAMINOPHEN 650 MG: 325 TABLET ORAL at 16:23

## 2019-04-08 RX ADMIN — DIPHENHYDRAMINE HYDROCHLORIDE 25 MG: 25 CAPSULE ORAL at 22:02

## 2019-04-08 RX ADMIN — Medication 10 ML: at 21:10

## 2019-04-08 NOTE — PROGRESS NOTES
Daily Progress Note: 4/8/2019 [facility] Sharona Sánchez MD 
 
Assessment/Plan:  
Acute blood loss anemia (3/4/2017): Severe and symptomatic. Likely from the prior duodenal ulcers. Hgb 5.2 POA. Transfuse PRBCs as needed 
  
Acute GI bleeding (5/8/2018) / HxDuodenal ulcer (4/5/2019): likely triggered by ongoing alcohol use. No NSAIDs use. IV PPi converted to po. Consult GI. EGD with ulcer - ? Source of bleed 
  
SOB (shortness of breath) (6/3/2015) / Sinus tachycardia POA: due to severe anemia. Should improve with transfusion and cessation of ongoing bleeding 
  
Alcohol abuse (6/3/2015): still active. Closely watch for withdrawal symptoms. Resume Librium. Counseled on cessation. Camprel and Lexapro started inpt.    
  
Hypothyroidism (6/3/2015): resume Levothyrxine 
  
Depression (6/3/2015): resume Cymbalta 
  
Code Status:  Full Problem List: 
Problem List as of 4/8/2019 Date Reviewed: 3/5/2017 Codes Class Noted - Resolved Melena ICD-10-CM: K92.1 ICD-9-CM: 578.1  4/6/2019 - Present Duodenal ulcer ICD-10-CM: K26.9 ICD-9-CM: 532.90  4/5/2019 - Present Acute GI bleeding ICD-10-CM: K92.2 ICD-9-CM: 578.9  5/8/2018 - Present Gastric ulcer ICD-10-CM: K25.9 ICD-9-CM: 531.90  5/8/2018 - Present SIRS (systemic inflammatory response syndrome) (HCC) ICD-10-CM: R65.10 ICD-9-CM: 995.90  5/8/2018 - Present GI bleed ICD-10-CM: K92.2 ICD-9-CM: 578.9  3/4/2017 - Present * (Principal) Acute blood loss anemia ICD-10-CM: D62 
ICD-9-CM: 285.1  3/4/2017 - Present Hypothyroidism (Chronic) ICD-10-CM: E03.9 ICD-9-CM: 244.9  6/3/2015 - Present Depression (Chronic) ICD-10-CM: F32.9 ICD-9-CM: 672  6/3/2015 - Present Microcytic anemia (Chronic) ICD-10-CM: D50.9 ICD-9-CM: 280.9  6/3/2015 - Present SOB (shortness of breath) ICD-10-CM: R06.02 
ICD-9-CM: 786.05  6/3/2015 - Present Alcohol abuse (Chronic) ICD-10-CM: F10.10 ICD-9-CM: 305.00  6/3/2015 - Present Subjective:  
 52 y.o. female who is being admitted for acute blood loss anemia due to a GI bleed. Ms. Harvinder Nina presented to our Emergency Department today complaining of a progressively worsening fatigue, exertional SOB associated with palpitations and melena stools. She says she has had melanotic stools for the past several days but denies any hematemesis. She has a hx of a bleeding duodenal ulcer for which she was admitted in May 2018 and an EGD done then showed 1 ulcer(s) 8 mm in size located in [place] sweep for which she had an injection of 9 cc of epinephrine and heater probe therapy. She says she still drinks alcohol and has not been consistent in taking PPIs. Her Hgb today was found to be 5.2 g/dL and she remains tachycardic. She will be admitted for further management. 4/6:  No complaint at this time but sleepy from EGD just done. EGD showed ulcers but not currently bleeding. Continuing to monitor for ETOH withdrawal.  Cont to monitor Hb.  
 
4/7:  C/O a little indigestion this AM.  No other complaint and otherwise feeling better. Tolerating reg diet. Hb 9.2 this AM.  Cont to monitor. K+ lower - replacing. Can go out of unit to floor today. 4/8:  No ab discomfort this AM.  Feeling better. Hb 9.0 - watch another day. K+ 3.1 - replacing. No withdrawal sx. Long discussion of ETOH abuse and she wants to DC completely. She wants to try Campral.  Long hx of classic OCD (hand washing, listing, counting) but no recent tx - wants to try Lexapro again - was on it in the past - already on Cymbalta but she thinks it is not helping much. Review of Systems: A comprehensive review of systems was negative except for that written in the HPI. Objective:  
Physical Exam:  
 
Visit Vitals /85 (BP 1 Location: Left arm) Pulse 85 Temp 97.5 °F (36.4 °C) Resp 18 Ht 4' 10\" (1.473 m) Wt 53.8 kg (118 lb 9.7 oz) SpO2 100% BMI 24.79 kg/m² O2 Flow Rate (L/min): 4 l/min O2 Device: Room air Temp (24hrs), Av.8 °F (36.6 °C), Min:96.4 °F (35.8 °C), Max:98.6 °F (37 °C) No intake/output data recorded. 701 - 1900 In: 3636.3 [P.O.:510; I.V.:2805] Out: - General:  Sleepy but awakens easily, cooperative, no distress, appears stated age. Head:  Normocephalic, without obvious abnormality, atraumatic. Eyes:  Conjunctivae/corneas clear. PERRL, EOMs intact. Nose: Nares normal. Septum midline. Mucosa normal. No drainage or sinus tenderness. Throat: Lips, mucosa, and tongue moist.. Neck: Supple, symmetrical, trachea midline, no adenopathy, thyroid: no enlargement/tenderness/nodules, no carotid bruit and no JVD. Lungs:   Clear to auscultation bilaterally. Chest wall:  No tenderness or deformity. Heart:  Regular rate and rhythm, S1, S2 normal, no murmur, click, rub or gallop. Abdomen:   Soft, non-tender. Bowel sounds normal. No masses,  No organomegaly. Extremities: no cyanosis or edema. No calf tenderness or cords. Pulses: 2+ and symmetric all extremities. Skin:  turgor normal. No rashes or lesions Neurologic: CNII-XII intact. Alert and oriented X 3. Fine motor of hands and fingers normal.   equal.  No cogwheeling or rigidity. Gait not tested at this time. Sensation grossly normal to touch. Gross motor of extremities normal.    
 
Data Review:  
   
Recent Days: 
Recent Labs 19 
5139 19 
1530 19 
0444 19 
0055  19 
1913 WBC 5.5  --   --   --   --  11.3* HGB 9.0* 10.8* 9.2* 6.2*   < > 5.2* HCT 27.6*  --  27.2* 18.5*   < > 15.8*  
*  --   --   --   --  142*  
 < > = values in this interval not displayed. Recent Labs 19 
1945 19 
0055 19 
0146 19 
1913 19 
1830  134* 139  --  133* K 3.1* 3.2* 3.5  --  4.5  
* 104 110*  --  104 CO2 26 23 23  --  18* GLU 85 80 94  --  141* BUN 2* 5* 12  --  19  
CREA 0.48* 0.50* 0.53*  --  0.74 CA 8.3* 7.4* 7.9*  --  8.7 MG 2.2  --   --   --   --   
ALB 3.0*  --  2.9*  --  3.0* TBILI 0.4  --  0.9  --  0.2 SGOT 35  --  19  --  34 ALT 29  --  23  --  27 INR  --   --   --  1.1  -- No results for input(s): PH, PCO2, PO2, HCO3, FIO2 in the last 72 hours. 24 Hour Results: 
Recent Results (from the past 24 hour(s)) HEMOGLOBIN Collection Time: 04/07/19  3:30 PM  
Result Value Ref Range HGB 10.8 (L) 11.5 - 16.0 g/dL METABOLIC PANEL, COMPREHENSIVE Collection Time: 04/08/19  2:32 AM  
Result Value Ref Range Sodium 138 136 - 145 mmol/L Potassium 3.1 (L) 3.5 - 5.1 mmol/L Chloride 109 (H) 97 - 108 mmol/L  
 CO2 26 21 - 32 mmol/L Anion gap 3 (L) 5 - 15 mmol/L Glucose 85 65 - 100 mg/dL BUN 2 (L) 6 - 20 MG/DL Creatinine 0.48 (L) 0.55 - 1.02 MG/DL  
 BUN/Creatinine ratio 4 (L) 12 - 20 GFR est AA >60 >60 ml/min/1.73m2 GFR est non-AA >60 >60 ml/min/1.73m2 Calcium 8.3 (L) 8.5 - 10.1 MG/DL Bilirubin, total 0.4 0.2 - 1.0 MG/DL  
 ALT (SGPT) 29 12 - 78 U/L  
 AST (SGOT) 35 15 - 37 U/L Alk. phosphatase 75 45 - 117 U/L Protein, total 5.6 (L) 6.4 - 8.2 g/dL Albumin 3.0 (L) 3.5 - 5.0 g/dL Globulin 2.6 2.0 - 4.0 g/dL A-G Ratio 1.2 1.1 - 2.2 MAGNESIUM Collection Time: 04/08/19  2:32 AM  
Result Value Ref Range Magnesium 2.2 1.6 - 2.4 mg/dL CBC WITH AUTOMATED DIFF Collection Time: 04/08/19  2:32 AM  
Result Value Ref Range WBC 5.5 3.6 - 11.0 K/uL  
 RBC 2.84 (L) 3.80 - 5.20 M/uL HGB 9.0 (L) 11.5 - 16.0 g/dL HCT 27.6 (L) 35.0 - 47.0 % MCV 97.2 80.0 - 99.0 FL  
 MCH 31.7 26.0 - 34.0 PG  
 MCHC 32.6 30.0 - 36.5 g/dL  
 RDW 15.9 (H) 11.5 - 14.5 % PLATELET 296 (L) 590 - 400 K/uL MPV 10.2 8.9 - 12.9 FL  
 NRBC 0.0 0  WBC ABSOLUTE NRBC 0.00 0.00 - 0.01 K/uL NEUTROPHILS 46 32 - 75 % LYMPHOCYTES 40 12 - 49 % MONOCYTES 11 5 - 13 % EOSINOPHILS 4 0 - 7 % BASOPHILS 0 0 - 1 % IMMATURE GRANULOCYTES 0 0.0 - 0.5 % ABS. NEUTROPHILS 2.5 1.8 - 8.0 K/UL  
 ABS. LYMPHOCYTES 2.2 0.8 - 3.5 K/UL  
 ABS. MONOCYTES 0.6 0.0 - 1.0 K/UL  
 ABS. EOSINOPHILS 0.2 0.0 - 0.4 K/UL  
 ABS. BASOPHILS 0.0 0.0 - 0.1 K/UL  
 ABS. IMM. GRANS. 0.0 0.00 - 0.04 K/UL  
 DF AUTOMATED Medications reviewed Current Facility-Administered Medications Medication Dose Route Frequency  potassium chloride SR (KLOR-CON 10) tablet 10 mEq  10 mEq Oral BID  
 0.45% sodium chloride infusion  75 mL/hr IntraVENous CONTINUOUS  
 midazolam (VERSED) injection 0.25-5 mg  0.25-5 mg IntraVENous Multiple  simethicone (MYLICON) 30TP/0.4ZE oral drops 80 mg  1.2 mL Oral Multiple  sodium chloride (NS) flush 5-40 mL  5-40 mL IntraVENous Q8H  
 sodium chloride (NS) flush 5-40 mL  5-40 mL IntraVENous PRN  
 acetaminophen (TYLENOL) tablet 650 mg  650 mg Oral Q4H PRN  
 naloxone (NARCAN) injection 0.4 mg  0.4 mg IntraVENous PRN  
 ondansetron (ZOFRAN) injection 4 mg  4 mg IntraVENous Q4H PRN  pantoprazole (PROTONIX) 40 mg in sodium chloride 0.9% 10 mL injection  40 mg IntraVENous Q12H  chlordiazePOXIDE (LIBRIUM) capsule 10 mg  10 mg Oral TID PRN  
 DULoxetine (CYMBALTA) capsule 120 mg  120 mg Oral DAILY  levothyroxine (SYNTHROID) tablet 50 mcg  50 mcg Oral ACB Care Plan discussed with: Patient and Nurse Total time spent with patient: 30 minutes.  
Joe Hernandez MD

## 2019-04-08 NOTE — PROGRESS NOTES
Reason for Admission:   Acute blood loss anemia RRAT Score:   11 Plan for utilizing home health:   Not indicated. No prior HH hx.    
                 
Current Advanced Directive/Advance Care Plan:  Pt met with Pastoral Care yesterday and was provided information on AMD. Likelihood of Readmission:  Low Transition of Care Plan:    Home CM met with pt for d/c planning. Pt talked at length about unresolved issues related to her family of origin. She was provided information on [facility] MH/SA services and encouraged to meet with a counselor. Pt reported having a limited support system but has friends that she can rely on. She lives alone in a two story house with two steps to enter and 11 steps to her bedroom. She has Rx coverage and plans on using the [facility] pharmacy in [place]. Pt does not drive reportedly due a DUI charge and car issues. She was provided information on [facility]. Shoaib Hobson LCSW Care Management Interventions PCP Verified by CM: Yes(Dr. Tomasa Middleton; no nurse navigator) Palliative Care Criteria Met (RRAT>21 & CHF Dx)?: No 
Transition of Care Consult (CM Consult): Discharge Planning Discharge Durable Medical Equipment: No 
Physical Therapy Consult: No 
Occupational Therapy Consult: No 
Speech Therapy Consult: No 
Current Support Network: Lives Alone Confirm Follow Up Transport: Friends Plan discussed with Pt/Family/Caregiver: Yes Discharge Location Discharge Placement: Home

## 2019-04-08 NOTE — CDMP QUERY
Patient admitted with GIB/duodenal ulcer noted to have \"low K\". If possible, please document in progress notes and d/c summary if you are evaluating and/or treating any of the following:  
 
=> Hypokalemia 
=> Other explanation of clinical findings  
=> Clinically Undetermined (no explanation for clinical findings) The medical record reflects the following: 
   
 Risk Factors: GIB/duodenal ulcer Clinical Indicators:  
4/6 K 3.5 
4/7 K 3.2 
4/8 K 3.1 Treatment: KCL supplements; monitor daily CMP Thank you, 
Luis Angel Brown, MSN, [facility], [address] [place] [address]

## 2019-04-08 NOTE — PROGRESS NOTES
[address] [facility] NP 
[phone] GI PROGRESS NOTE 
 
 
NAME: Alhaji Trinidad :  1965 MRN:  [id number] Subjective: No complaints. \"I am slowly getting stronger\". No recent bloody or black bowel movements. Objective: In NAD 
 
 
VITALS:  
Last 24hrs VS reviewed since prior progress note. Most recent are: 
Visit Vitals /75 (BP 1 Location: Left arm) Pulse 76 Temp 97.9 °F (36.6 °C) Resp 18 Ht 4' 10\" (1.473 m) Wt 53.8 kg (118 lb 9.7 oz) SpO2 96% BMI 24.79 kg/m² Intake/Output Summary (Last 24 hours) at 2019 1000 Last data filed at 2019 1937 Gross per 24 hour Intake 1602.5 ml Output  Net 1602.5 ml PHYSICAL EXAM: 
General: Alert, in no acute distress HEENT: Anicteric sclerae. Lungs:            CTA Bilaterally. Heart:  Regular  rhythm, Abdomen: Soft, Non distended, Non tender.  (+)Bowel sounds, no HSM Extremities: No c/c/e Neurologic:  CN 2-12 gi, Alert and oriented X 3. No acute neurological distress Psych:   Good insight. Not anxious nor agitated. Lab Data Reviewed:  
Recent Labs 19 
0232 19 
1530 19 
3444  19 
1913 WBC 5.5  --   --   --  11.3* HGB 9.0* 10.8* 9.2*   < > 5.2* HCT 27.6*  --  27.2*   < > 15.8*  
*  --   --   --  142*  
 < > = values in this interval not displayed. Recent Labs 19 
4726 19 
9715  134* K 3.1* 3.2*  
* 104 CO2 26 23 BUN 2* 5*  
CREA 0.48* 0.50* GLU 85 80  
CA 8.3* 7.4* Recent Labs 19 
0232 19 
0146 19 
1830 SGOT 35 19 34 AP 75 73 92  
TP 5.6* 5.3* 6.2* ALB 3.0* 2.9* 3.0*  
GLOB 2.6 2.4 3.2 LPSE  --   --  95  
 
 
________________________________________________________________________ Patient Active Problem List  
Diagnosis Code  Hypothyroidism E03.9  Depression F32.9  Microcytic anemia D50.9  SOB (shortness of breath) R06.02  
  Alcohol abuse F10.10  GI bleed K92.2  Acute blood loss anemia D62  Acute GI bleeding K92.2  Gastric ulcer K25.9  
 SIRS (systemic inflammatory response syndrome) (HCC) R65.10  Duodenal ulcer K26.9  Melena K92.1 Assessment and Plan: 
GI Bleeding:  Duodenal Ulcer noted on EGD. Hemoglobin has dipped but there are no signs of symptoms of active bleeding. 
- Continue PPI BID x 8 weeks then daily there after. 
- Continue Carafate Before Meals and at Bedtime x 4 weeks. - No NAIDS 
- No Alcohol 
- Continue diet as tolerated Ok to discharge from GI standpoint. Please have her follow up in our office after her discharge home. Please call with any questions or concerns.  
 
  
Signed By: Shani Darling NP   
 4/8/2019  10:00 AM

## 2019-04-08 NOTE — PROGRESS NOTES
0700 - Bedside shift change report given to Eva Bettencourt (oncoming nurse) by Cheryle Phalen (offgoing nurse). Report included the following information SBAR, Kardex, MAR and Recent Results. 1000 - Case management speaking with the patient. 26 - Notified Dr. Nancy Holter of patient's hgb, still watching K so possible discharge tomorrow. Also received order for tylenol PM and Flonase for the patient. 1400 - Patient resting no complaints. 1900 - Bedside shift change report given to Nereida Stanton 1313 (oncoming nurse) by Eva Bettencourt (offgoing nurse). Report included the following information SBAR, Kardex, MAR and Recent Results.

## 2019-04-08 NOTE — PROGRESS NOTES
Nutrition: 
 
Pt requesting to see RD for help to \"eat better. \" Pt states she eats stouffers meat loaf and mashed potatoes \"like almost every day\" for dinner. Encouraged general healthful diet including a variety of foods. Pt claims to drink 2 gallons of 2% milk per week but can't finish a \"bag of salad\" before it goes bad. Pt given MNT for general healthful diet and simple recipes to expand knowledge and variety of intakes. Encouraged pt to make appointment with outpt services for meal planning and diet/lifestyle adjustments. Augusta Anthony, [address] [facility]

## 2019-04-08 NOTE — PROGRESS NOTES
Patient is out of the ICU and does not have any pulmonary needs at this time. We will sign off and be available as needed.

## 2019-04-08 NOTE — PROGRESS NOTES
Bedside and Verbal shift change report given to [address] (oncoming nurse) by Harpreet Orr (offgoing nurse). Report included the following information SBAR, Kardex, ED Summary and MAR.

## 2019-04-09 VITALS
OXYGEN SATURATION: 99 % | SYSTOLIC BLOOD PRESSURE: 106 MMHG | DIASTOLIC BLOOD PRESSURE: 70 MMHG | WEIGHT: 118.61 LBS | HEIGHT: 58 IN | RESPIRATION RATE: 18 BRPM | HEART RATE: 95 BPM | TEMPERATURE: 97.4 F | BODY MASS INDEX: 24.9 KG/M2

## 2019-04-09 LAB
ABO + RH BLD: NORMAL
ALBUMIN SERPL-MCNC: 3 G/DL (ref 3.5–5)
ALBUMIN/GLOB SERPL: 1 {RATIO} (ref 1.1–2.2)
ALP SERPL-CCNC: 92 U/L (ref 45–117)
ALT SERPL-CCNC: 32 U/L (ref 12–78)
ANION GAP SERPL CALC-SCNC: 5 MMOL/L (ref 5–15)
AST SERPL-CCNC: 28 U/L (ref 15–37)
BASOPHILS # BLD: 0 K/UL (ref 0–0.1)
BASOPHILS NFR BLD: 0 % (ref 0–1)
BILIRUB SERPL-MCNC: 0.3 MG/DL (ref 0.2–1)
BLD PROD TYP BPU: NORMAL
BLOOD GROUP ANTIBODIES SERPL: NORMAL
BPU ID: NORMAL
BUN SERPL-MCNC: 5 MG/DL (ref 6–20)
BUN/CREAT SERPL: 9 (ref 12–20)
CALCIUM SERPL-MCNC: 8.8 MG/DL (ref 8.5–10.1)
CHLORIDE SERPL-SCNC: 107 MMOL/L (ref 97–108)
CO2 SERPL-SCNC: 27 MMOL/L (ref 21–32)
CREAT SERPL-MCNC: 0.58 MG/DL (ref 0.55–1.02)
CROSSMATCH RESULT,%XM: NORMAL
DIFFERENTIAL METHOD BLD: ABNORMAL
EOSINOPHIL # BLD: 0.2 K/UL (ref 0–0.4)
EOSINOPHIL NFR BLD: 5 % (ref 0–7)
ERYTHROCYTE [DISTWIDTH] IN BLOOD BY AUTOMATED COUNT: 15.8 % (ref 11.5–14.5)
GLOBULIN SER CALC-MCNC: 3.1 G/DL (ref 2–4)
GLUCOSE SERPL-MCNC: 88 MG/DL (ref 65–100)
HCT VFR BLD AUTO: 27.8 % (ref 35–47)
HCT VFR BLD AUTO: 29.9 % (ref 35–47)
HGB BLD-MCNC: 10 G/DL (ref 11.5–16)
HGB BLD-MCNC: 9 G/DL (ref 11.5–16)
IMM GRANULOCYTES # BLD AUTO: 0 K/UL (ref 0–0.04)
IMM GRANULOCYTES NFR BLD AUTO: 0 % (ref 0–0.5)
LYMPHOCYTES # BLD: 1.9 K/UL (ref 0.8–3.5)
LYMPHOCYTES NFR BLD: 42 % (ref 12–49)
MAGNESIUM SERPL-MCNC: 2.3 MG/DL (ref 1.6–2.4)
MCH RBC QN AUTO: 31.7 PG (ref 26–34)
MCHC RBC AUTO-ENTMCNC: 32.4 G/DL (ref 30–36.5)
MCV RBC AUTO: 97.9 FL (ref 80–99)
MONOCYTES # BLD: 0.5 K/UL (ref 0–1)
MONOCYTES NFR BLD: 10 % (ref 5–13)
NEUTS SEG # BLD: 1.9 K/UL (ref 1.8–8)
NEUTS SEG NFR BLD: 43 % (ref 32–75)
NRBC # BLD: 0 K/UL (ref 0–0.01)
NRBC BLD-RTO: 0 PER 100 WBC
PLATELET # BLD AUTO: 165 K/UL (ref 150–400)
PMV BLD AUTO: 10.1 FL (ref 8.9–12.9)
POTASSIUM SERPL-SCNC: 3.6 MMOL/L (ref 3.5–5.1)
PROT SERPL-MCNC: 6.1 G/DL (ref 6.4–8.2)
RBC # BLD AUTO: 2.84 M/UL (ref 3.8–5.2)
SODIUM SERPL-SCNC: 139 MMOL/L (ref 136–145)
SPECIMEN EXP DATE BLD: NORMAL
STATUS OF UNIT,%ST: NORMAL
UNIT DIVISION, %UDIV: 0
WBC # BLD AUTO: 4.5 K/UL (ref 3.6–11)

## 2019-04-09 PROCEDURE — 85025 COMPLETE CBC W/AUTO DIFF WBC: CPT

## 2019-04-09 PROCEDURE — 83735 ASSAY OF MAGNESIUM: CPT

## 2019-04-09 PROCEDURE — 74011250637 HC RX REV CODE- 250/637: Performed by: NURSE PRACTITIONER

## 2019-04-09 PROCEDURE — 74011250637 HC RX REV CODE- 250/637: Performed by: INTERNAL MEDICINE

## 2019-04-09 PROCEDURE — 36415 COLL VENOUS BLD VENIPUNCTURE: CPT

## 2019-04-09 PROCEDURE — 85018 HEMOGLOBIN: CPT

## 2019-04-09 PROCEDURE — 94760 N-INVAS EAR/PLS OXIMETRY 1: CPT

## 2019-04-09 PROCEDURE — 74011250636 HC RX REV CODE- 250/636: Performed by: INTERNAL MEDICINE

## 2019-04-09 PROCEDURE — 74011250637 HC RX REV CODE- 250/637: Performed by: FAMILY MEDICINE

## 2019-04-09 PROCEDURE — 80053 COMPREHEN METABOLIC PANEL: CPT

## 2019-04-09 PROCEDURE — 74011000258 HC RX REV CODE- 258: Performed by: INTERNAL MEDICINE

## 2019-04-09 PROCEDURE — C9113 INJ PANTOPRAZOLE SODIUM, VIA: HCPCS | Performed by: INTERNAL MEDICINE

## 2019-04-09 RX ORDER — LEVOFLOXACIN 500 MG/1
500 TABLET, FILM COATED ORAL DAILY
Qty: 7 TAB | Refills: 0 | Status: SHIPPED | OUTPATIENT
Start: 2019-04-09

## 2019-04-09 RX ORDER — PHENOL/SODIUM PHENOLATE
20 AEROSOL, SPRAY (ML) MUCOUS MEMBRANE DAILY
Qty: 30 TAB | Refills: 6 | Status: SHIPPED | OUTPATIENT
Start: 2019-04-09

## 2019-04-09 RX ORDER — SUCRALFATE 1 G/1
1 TABLET ORAL
Qty: 120 TAB | Refills: 6 | Status: SHIPPED | OUTPATIENT
Start: 2019-04-09

## 2019-04-09 RX ORDER — ACAMPROSATE CALCIUM 333 MG/1
333 TABLET, DELAYED RELEASE ORAL 3 TIMES DAILY
Qty: 90 TAB | Refills: 6 | Status: SHIPPED | OUTPATIENT
Start: 2019-04-09

## 2019-04-09 RX ADMIN — LEVOTHYROXINE SODIUM 50 MCG: 50 TABLET ORAL at 06:32

## 2019-04-09 RX ADMIN — SODIUM CHLORIDE 75 ML/HR: 450 INJECTION, SOLUTION INTRAVENOUS at 01:53

## 2019-04-09 RX ADMIN — ACETAMINOPHEN 650 MG: 325 TABLET ORAL at 09:27

## 2019-04-09 RX ADMIN — SUCRALFATE 1 G: 1 TABLET ORAL at 06:32

## 2019-04-09 RX ADMIN — FLUTICASONE PROPIONATE 2 SPRAY: 50 SPRAY, METERED NASAL at 09:00

## 2019-04-09 RX ADMIN — SODIUM CHLORIDE 40 MG: 9 INJECTION INTRAMUSCULAR; INTRAVENOUS; SUBCUTANEOUS at 09:27

## 2019-04-09 RX ADMIN — POTASSIUM CHLORIDE 20 MEQ: 750 TABLET, EXTENDED RELEASE ORAL at 09:27

## 2019-04-09 RX ADMIN — DULOXETINE HYDROCHLORIDE 120 MG: 30 CAPSULE, DELAYED RELEASE ORAL at 09:26

## 2019-04-09 RX ADMIN — Medication 10 ML: at 06:33

## 2019-04-09 RX ADMIN — SUCRALFATE 1 G: 1 TABLET ORAL at 11:38

## 2019-04-09 NOTE — PROGRESS NOTES
0720- Bedside and Verbal shift change report given to [facility] (oncoming nurse) by Rupinder Calzada (offgoing nurse). Report included the following information SBAR, Kardex, Intake/Output, MAR and Recent Results. Pt observed in bed, watching tv, on room air, denies any pain at this time, call bell within reach, will assess. 1024- This RN called to patient room by CM. Patient stated that she had a BM after a large amount of gas. BM still present in toilet. Harlo Nissen with bright red blood present. Will contact Dr. Cathye Severance. 1025- Call placed to Dr. Diana Knight and updated. MD stated to recheck Hgb at 1200, if stable can continue with DC. New orders in connect. 1235- Patient repeat Hgb at 10.0. MD OK with continuing discharge.

## 2019-04-09 NOTE — PROGRESS NOTES
CM Note: 
Pt to have Hgb rechecked at noon. If good she can d/c to home. Transport will be provided by friends. No BOB.  
STEVEN Lloyd

## 2019-04-09 NOTE — PROGRESS NOTES
Bedside shift change report given to [facility] (oncoming nurse) by Leah Elkins (offgoing nurse). Report included the following information SBAR and Kardex.

## 2019-04-09 NOTE — DISCHARGE SUMMARY
Physician Discharge Summary     Patient ID:    Andrew Ray  [id number]  52 y.o.  1965  Ciera Verduzco MD    Admit date: 4/5/2019  Discharge date and time: 4/9/2019  Admission Diagnoses: Acute blood loss anemia [D62]    Chronic Diagnoses:    Problem List as of 4/9/2019 Date Reviewed: 3/5/2017          Codes Class Noted - Resolved    Melena ICD-10-CM: K92.1  ICD-9-CM: 578.1  4/6/2019 - Present        Duodenal ulcer ICD-10-CM: K26.9  ICD-9-CM: 532.90  4/5/2019 - Present        Acute GI bleeding ICD-10-CM: K92.2  ICD-9-CM: 578.9  5/8/2018 - Present        Gastric ulcer ICD-10-CM: K25.9  ICD-9-CM: 531.90  5/8/2018 - Present        SIRS (systemic inflammatory response syndrome) ([facility] ICD-10-CM: R65.10  ICD-9-CM: 995.90  5/8/2018 - Present        GI bleed ICD-10-CM: K92.2  ICD-9-CM: 578.9  3/4/2017 - Present        * (Principal) Acute blood loss anemia ICD-10-CM: D62  ICD-9-CM: 285.1  3/4/2017 - Present        Hypothyroidism (Chronic) ICD-10-CM: E03.9  ICD-9-CM: 244.9  6/3/2015 - Present        Depression (Chronic) ICD-10-CM: F32.9  ICD-9-CM: 789  6/3/2015 - Present        Microcytic anemia (Chronic) ICD-10-CM: D50.9  ICD-9-CM: 280.9  6/3/2015 - Present        SOB (shortness of breath) ICD-10-CM: R06.02  ICD-9-CM: 786.05  6/3/2015 - Present        Alcohol abuse (Chronic) ICD-10-CM: F10.10  ICD-9-CM: 305.00  6/3/2015 - Present            Discharge Medications:   Current Discharge Medication List      START taking these medications    Details   sucralfate (CARAFATE) 1 gram tablet Take 1 Tab by mouth Before breakfast, lunch, dinner and at bedtime. Qty: 120 Tab, Refills: 6      acamprosate (CAMPRAL) 333 mg tablet Take 1 Tab by mouth three (3) times daily. Qty: 90 Tab, Refills: 6      levoFLOXacin (LEVAQUIN) 500 mg tablet Take 1 Tab by mouth daily.   Qty: 7 Tab, Refills: 0         CONTINUE these medications which have CHANGED    Details   Omeprazole delayed release (PRILOSEC D/R) 20 mg tablet Take 1 Tab by mouth daily.  Qty: 30 Tab, Refills: 6         CONTINUE these medications which have NOT CHANGED    Details   albuterol (PROAIR HFA) 90 mcg/actuation inhaler Take 2 Puffs by inhalation every four (4) hours as needed for Wheezing. ascorbate calcium (VITAMIN C PO) Take 1 Tab by mouth daily as needed. chlordiazePOXIDE (LIBRIUM) 5 mg capsule Take 10 mg by mouth three (3) times daily as needed for Anxiety. levothyroxine (SYNTHROID) 50 mcg tablet Take 1 Tab by mouth Daily (before breakfast). Qty: 30 Tab, Refills: 0      multivit-min-iron-FA-lutein (CENTRUM SILVER WOMEN) 8 mg iron-400 mcg-300 mcg tab Take 1 Tab by mouth daily. Qty: 100 Tab, Refills: 0      butalbital-acetaminophen-caff (FIORICET) -40 mg per capsule Take 1 Cap by mouth every four (4) hours as needed for Pain. Qty: 10 Cap, Refills: 0    Associated Diagnoses: Intractable migraine without aura and without status migrainosus      potassium chloride (KLOR-CON) 10 mEq tablet Take 1 Tab by mouth daily. Qty: 30 Tab, Refills: 3      adalimumab (HUMIRA) 40 mg/0.8 mL injection 40 mg by SubCUTAneous route Once every 2 weeks. Indications: MODERATE TO SEVERE PLAQUE PSORIASIS      ferrous sulfate 325 mg (65 mg iron) tablet Take 325 mg by mouth Daily (before breakfast). cyanocobalamin 1,000 mcg tablet Take 1,000 mcg by mouth daily. magnesium oxide (MAG-OX) 400 mg tablet Take 400 mg by mouth daily. DULoxetine (CYMBALTA) 60 mg capsule Take 120 mg by mouth daily. Follow up Care:    1. Sharona Sánchez MD with in 2-3 days  2.  specialists as directed. Diet:  Regular Diet  Disposition:  Home.   Advanced Directive:  Discharge Exam:  [See today's progress note.]  CONSULTATIONS: GI    Significant Diagnostic Studies:   Recent Labs     04/09/19  0705 04/08/19  1317   WBC 4.5 5.9   HGB 9.0* 9.1*   HCT 27.8* 26.8*    156     Recent Labs     04/09/19  0705 04/08/19  0232 04/07/19  0055    138 134*   K 3.6 3.1* 3.2*    109* 104 CO2 27 26 23   BUN 5* 2* 5*   CREA 0.58 0.48* 0.50*   GLU 88 85 80   CA 8.8 8.3* 7.4*   MG 2.3 2.2  --      Recent Labs     04/09/19  0705 04/08/19  0232   SGOT 28 35   ALT 32 29   AP 92 75   TBILI 0.3 0.4   TP 6.1* 5.6*   ALB 3.0* 3.0*   GLOB 3.1 2.6     Lab Results   Component Value Date/Time    TSH 5.17 (H) 05/09/2018 12:51 AM     HOSPITAL COURSE & TREATMENT RENDERED:   1. See today's progress note:  Daily Progress Note and Discharge Note: 4/9/2019  Tess Leach MD         Assessment/Plan:   Acute blood loss anemia (3/4/2017): Severe and symptomatic. Likely from the prior duodenal ulcers. Hgb 5.2 POA. Transfused PRBCs.      Acute GI bleeding (5/8/2018) / HxDuodenal ulcer (4/5/2019): likely triggered by ongoing alcohol use. No NSAIDs use. IV PPi converted to po. Consult GI. EGD with ulcer - likely source of bleed     SOB (shortness of breath) (6/3/2015) / Sinus tachycardia POA: due to severe anemia. Resolved.      Alcohol abuse (6/3/2015): still active. Closely watch for withdrawal symptoms. Resume Librium. Counseled on cessation. Campral to be started outpt and cont Cymbalta.        Hypothyroidism (6/3/2015): resumed Levothyrxine     Depression (6/3/2015): resumed Cymbalta     Hypokalemia - Replace. Monitor outpt.     Code Status:  Full          Subjective:    52 y. o. female who is being admitted for acute blood loss anemia due to a GI bleed. Ms. Frederick presented to our Emergency Department today complaining of a progressively worsening fatigue, exertional SOB associated with palpitations and melena stools. She says she has had melanotic stools for the past several days but denies any hematemesis. She has a hx of a bleeding duodenal ulcer for which she was admitted in May 2018 and an EGD done then showed 1 ulcer(s) 8 mm in size located in [employer] which she had an injection of 9 cc of epinephrine and heater probe therapy.  She says she still drinks alcohol and has not been consistent in taking PPIs. Her Hgb today was found to be 5.2 g/dL and she remains tachycardic. She will be admitted for further management.      :  No complaint at this time but sleepy from EGD just done. EGD showed ulcers but not currently bleeding. Continuing to monitor for ETOH withdrawal.  Cont to monitor Hb.      :  C/O a little indigestion this AM.  No other complaint and otherwise feeling better. Tolerating reg diet. Hb 9.2 this AM.  Cont to monitor. K+ lower - replacing. Can go out of unit to floor today.       :  No ab discomfort this AM.  Feeling better. Hb 9.0 - watch another day. K+ 3.1 - replacing. No withdrawal sx. Long discussion of ETOH abuse and she wants to DC completely. She wants to try Campral.  Long hx of classic OCD (hand washing, listing, counting) but no recent tx - wants to try Lexapro again - was on it in the past - already on Cymbalta but she thinks it is not helping much.        :  Feeling better. Hb stable. Discussed ETOH again - will rx Campral 333 TID initially and have her follow up with Dr Ash Calderon later this wk to discuss continuing it/titrating it. Cont PPI but daily instead of PRN. Resume other home meds. K+ back to normal.  Follow up later this wk to check labs also. Follow up with GI as they direct. She also c/o \"my usual spring sinus infection\" and want antibiotic for it. C/o  Minor sinus congestion and left max area pressure today.       Review of Systems:   A comprehensive review of systems was negative except for that written in the HPI.     Objective:   Physical Exam:    Visit Vitals  /83 (BP 1 Location: Left arm, BP Patient Position: Sitting)   Pulse 81   Temp 98.3 °F (36.8 °C)   Resp 18   Ht 4' 10\" (1.473 m)   Wt 53.8 kg (118 lb 9.7 oz)   SpO2 97%   BMI 24.79 kg/m²    O2 Flow Rate (L/min): 4 l/min O2 Device: Room air  Temp (24hrs), Av.9 °F (36.6 °C), Min:97.6 °F (36.4 °C), Max:98.3 °F (36.8 °C)    No intake/output data recorded. 04/07 1901 - 04/09 0700  In: 2831.3 [P.O.:800; I.V.:2031.3]  Out: -   General:  Sleepy but awakens easily, cooperative, no distress, appears stated age. Head:  Normocephalic, without obvious abnormality, atraumatic. Eyes:  Conjunctivae/corneas clear. PERRL, EOMs intact. Nose: Nares normal. Septum midline. Mucosa normal. Clear drainage and mild left max sinus tenderness. Throat: Lips, mucosa, and tongue moist..   Neck: Supple, symmetrical, trachea midline, no adenopathy, thyroid: no enlargement/tenderness/nodules, no carotid bruit and no JVD. Lungs:   Clear to auscultation bilaterally. Chest wall:  No tenderness or deformity. Heart:  Regular rate and rhythm, S1, S2 normal, no murmur, click, rub or gallop. Abdomen:   Soft, non-tender. Bowel sounds normal. No masses,  No organomegaly. Extremities: no cyanosis or edema. No calf tenderness or cords. Pulses: 2+ and symmetric all extremities. Skin:  turgor normal. No rashes or lesions   Neurologic: CNII-XII intact. Alert and oriented X 3. Fine motor of hands and fingers normal.   equal.  No cogwheeling or rigidity. Gait not tested at this time. Sensation grossly normal to touch.   Gross motor of extremities normal.       Signed:  Yoav Kwok MD  4/9/2019  8:16 AM

## 2019-04-09 NOTE — PROGRESS NOTES
Pharmacist Discharge Medication Reconciliation Discharge Provider:  Dr. Gilda An Discharge Medications: My Medications START taking these medications Instructions Each Dose to Equal Morning Noon Evening Bedtime  
acamprosate 333 mg tablet Commonly known as:  Marly Franco Your last dose was: Your next dose is: Take 1 Tab by mouth three (3) times daily. 333 mg 
  
  
  
  
  
levoFLOXacin 500 mg tablet Commonly known as:  Pantera Wright Your last dose was: Your next dose is: Take 1 Tab by mouth daily. 500 mg 
  
  
  
  
  
sucralfate 1 gram tablet Commonly known as:  Seferino Allison Your last dose was: Your next dose is: Take 1 Tab by mouth Before breakfast, lunch, dinner and at bedtime. 1 g CHANGE how you take these medications Instructions Each Dose to Equal Morning Noon Evening Bedtime Omeprazole delayed release 20 mg tablet Commonly known as:  PRILOSEC D/R What changed:   
when to take this 
reasons to take this Your last dose was: Your next dose is: Take 1 Tab by mouth daily. 20 mg CONTINUE taking these medications Instructions Each Dose to Equal Morning Noon Evening Bedtime  
butalbital-acetaminophen-caff -40 mg per capsule Commonly known as:  [employer] Your last dose was: Your next dose is: Take 1 Cap by mouth every four (4) hours as needed for Pain. 1 Cap 
  
  
  
  
  
chlordiazePOXIDE 5 mg capsule Commonly known as:  LIBRIUM Your last dose was: Your next dose is: Take 10 mg by mouth three (3) times daily as needed for Anxiety. 10 mg 
  
  
  
  
  
cyanocobalamin 1,000 mcg tablet Your last dose was: Your next dose is: Take 1,000 mcg by mouth daily. 1000 mcg DULoxetine 60 mg capsule Commonly known as:  CYMBALTA Your last dose was: Your next dose is: Take 120 mg by mouth daily. 120 mg 
  
  
  
  
  
ferrous sulfate 325 mg (65 mg iron) tablet Your last dose was: Your next dose is: Take 325 mg by mouth Daily (before breakfast). 325 mg 
  
  
  
  
  
HUMIRA 40 mg/0.8 mL injection Generic drug:  adalimumab Your last dose was: Your next dose is:   
 
  
 40 mg by SubCUTAneous route Once every 2 weeks. Indications: MODERATE TO SEVERE PLAQUE PSORIASIS 
 40 mg 
  
  
  
  
  
levothyroxine 50 mcg tablet Commonly known as:  SYNTHROID Your last dose was: Your next dose is: Take 1 Tab by mouth Daily (before breakfast). 50 mcg 
  
  
  
  
  
magnesium oxide 400 mg tablet Commonly known as:  MAG-OX Your last dose was: Your next dose is: Take 400 mg by mouth daily. 400 mg 
  
  
  
  
  
multivit-min-iron-FA-lutein 8 mg iron-400 mcg-300 mcg Tab Commonly known as:  CENTRUM SILVER WOMEN Your last dose was: Your next dose is: Take 1 Tab by mouth daily. 1 Tab 
  
  
  
  
  
potassium chloride 10 mEq tablet Commonly known as:  KLOR-CON Your last dose was: Your next dose is: Take 1 Tab by mouth daily. 10 mEq PROAIR HFA 90 mcg/actuation inhaler Generic drug:  albuterol Your last dose was: Your next dose is: Take 2 Puffs by inhalation every four (4) hours as needed for Wheezing. 2 Puff VITAMIN C PO Your last dose was: Your next dose is: Take 1 Tab by mouth daily as needed. 1 Tab Where to Get Your Medications Information on where to get these meds will be given to you by the nurse or doctor. Ask your nurse or doctor about these medications 
acamprosate 333 mg tablet 
levoFLOXacin 500 mg tablet Omeprazole delayed release 20 mg tablet 
sucralfate 1 gram tablet The patient's chart, MAR, and AVS were reviewed by Ignacio Peacre,  
Contact: [phone]

## 2019-04-09 NOTE — PROGRESS NOTES
Patient given discharge instructions and prescriptions. Patient verbalizes understanding of discharge instructions and prescriptions and follow up. PIV left intact presently. 1230: Lab results received, patient cleared for discharge. Patient called ride who will be here at 1330 to transport home. 1255: PIV x2 removed. Patient assisted with dressing and packing. 1330: Patient out of unit in stable condition in wheelchair. Friend driving patient to home.

## 2019-04-09 NOTE — DISCHARGE INSTRUCTIONS
Patient Discharge Instructions    Kelly Jhaveri / [id number] : 1965    Admitted 2019 Discharged: 2019 8:16 AM     ACUTE DIAGNOSES:  Acute blood loss anemia [D62]    CHRONIC MEDICAL DIAGNOSES:  Problem List as of 2019 Date Reviewed: 3/5/2017          Codes Class Noted - Resolved    Melena ICD-10-CM: K92.1  ICD-9-CM: 578.1  2019 - Present        Duodenal ulcer ICD-10-CM: K26.9  ICD-9-CM: 532.90  2019 - Present        Acute GI bleeding ICD-10-CM: K92.2  ICD-9-CM: 578.9  2018 - Present        Gastric ulcer ICD-10-CM: K25.9  ICD-9-CM: 531.90  2018 - Present        SIRS (systemic inflammatory response syndrome) ([facility] ICD-10-CM: R65.10  ICD-9-CM: 995.90  2018 - Present        GI bleed ICD-10-CM: K92.2  ICD-9-CM: 578.9  3/4/2017 - Present        * (Principal) Acute blood loss anemia ICD-10-CM: D62  ICD-9-CM: 285.1  3/4/2017 - Present        Hypothyroidism (Chronic) ICD-10-CM: E03.9  ICD-9-CM: 244.9  6/3/2015 - Present        Depression (Chronic) ICD-10-CM: F32.9  ICD-9-CM: 488  6/3/2015 - Present        Microcytic anemia (Chronic) ICD-10-CM: D50.9  ICD-9-CM: 280.9  6/3/2015 - Present        SOB (shortness of breath) ICD-10-CM: R06.02  ICD-9-CM: 786.05  6/3/2015 - Present        Alcohol abuse (Chronic) ICD-10-CM: F10.10  ICD-9-CM: 305.00  6/3/2015 - Present              DISCHARGE MEDICATIONS:         · It is important that you take the medication exactly as they are prescribed. · Keep your medication in the bottles provided by the pharmacist and keep a list of the medication names, dosages, and times to be taken in your wallet. · Do not take other medications without consulting your doctor.        DIET:  Regular Diet    ACTIVITY: Activity as tolerated    ADDITIONAL INFORMATION: If you experience any of the following symptoms then please call your primary care physician or return to the emergency room if you cannot get hold of your doctor: Fever, chills, nausea, vomiting, diarrhea, change in mentation, falling, bleeding, shortness of breath. FOLLOW UP CARE:  Dr. Miriam Mcleod MD  you are to call and set up an appointment to see them with in 2-3 days. Follow-up with specialists at directed by them      Information obtained by :  I understand that if any problems occur once I am at home I am to contact my physician. I understand and acknowledge receipt of the instructions indicated above.                                                                                                                                            Physician's or R.N.'s Signature                                                                  Date/Time                                                                                                                                              Patient or Representative Signature                                                          Date/Time

## 2019-04-09 NOTE — ADT AUTH CERT NOTES
Patient Demographics Patient Name Heri Maxwell 
[id number] Sex Female  
1965 Address [address] [address] Phone [phone] (Home) *Preferred*  
CSN:  
[id number] Admit Date: Admit Time Room Bed 2019  6:14  [73152] 01 [50203] Attending Providers Provider Pager From To Dolores Bush MD  19 Iram Mata MD  19 Stacey Dhaliwal MD  19 Emergency Contact(s) Name Relation Home Work Mobile [address] [phone] Utilization Reviews  
 
   
Anemia, Iron Deficiency or Unspecified - Care Day 4 (2019) by Danica Nelson RN  
 
   
Review Entered Review Status 2019 08:17 Completed  
   
Criteria Review Care Day: 4 Care Date: 2019 Level of Care: ICU Guideline Day 2 Clinical Status  
(X) * Hemodynamic stability 2019 08:17:04 EDT by Mera Porter 97.7 81 18 96% 125/76   
  
(X) * Mental status at baseline   
(X) * Active blood loss absent   
(X) * Signs and symptoms of anemia absent or improved ( ) * Hgb/Hct level stable and acceptable for next level of care ( ) * Etiology of anemia requiring inpatient care absent   
( ) * Discharge plans and education understood Activity  
(X) * Ambulatory[G] Routes  
(X) * Oral hydration, medications, and diet Interventions  
(X) Hgb/Hct * Milestone Additional Notes .. TRANSFUSED 3 UNITS PRBCS   
  
VS:  97.7  81  18  96%  125/76 RESULTS:  RBC 2.84, 2.78, HGB 9.0, 9.1, HCT 27.6, 26.8, RDW 15.9, 15.7, , K 3.1M , ANION GAP 3, BUN 2, CR 0.48, CA 8.3, PROTEIN 5.6, ALB 3.0 MEDS: NS 75 ML/HR CONT IV, PROTONIX 40 MG IV EVERY 12 HOURS PROGRESS NOTES:  
  
GI:  
GI Bleeding:  Duodenal Ulcer noted on EGD.  Hemoglobin has dipped but there are no signs of symptoms of active bleeding. - Continue PPI BID x 8 weeks then daily there after.  
- Continue Carafate Before Meals and at Bedtime x 4 weeks. - No NAIDS  
- No Alcohol  
- Continue diet as tolerated  
   
  
IM:  
Acute blood loss anemia (3/4/2017): Severe and symptomatic. Likely from the prior duodenal ulcers. Hgb 5.2 POA.  Transfuse PRBCs as needed  
   
Acute GI bleeding (5/8/2018) / HxDuodenal ulcer (4/5/2019): likely triggered by ongoing alcohol use. No NSAIDs use.  IV PPi converted to po. Consult GI. EGD with ulcer - ? Source of bleed  
   
SOB (shortness of breath) (6/3/2015) / Sinus tachycardia POA: due to severe anemia. Should improve with transfusion and cessation of ongoing bleeding  
   
Alcohol abuse (6/3/2015): still active. Closely watch for withdrawal symptoms. Resume Librium. Counseled on cessation.  Camprel and Lexapro started inpt.     
   
Hypothyroidism (6/3/2015): resume Levothyrxine  
   
Depression (6/3/2015): resume Cymbalta  
   
Code Status:  Full  
  
   
Anemia, Iron Deficiency or Unspecified - Care Day 2 (4/6/2019) by Danica Nelson RN  
 
   
Review Entered Review Status 4/8/2019 13:16 Completed  
   
Criteria Review Care Day: 2 Care Date: 4/6/2019 Level of Care: ICU Guideline Day 2 Clinical Status  
(X) * Hemodynamic stability 4/8/2019 13:16:34 EDT by Mera Porter 98.2 89 17 98% 83/55 MAP 61   
  
(X) * Mental status at baseline   
(X) * Active blood loss absent   
(X) * Signs and symptoms of anemia absent or improved ( ) * Hgb/Hct level stable and acceptable for next level of care ( ) * Etiology of anemia requiring inpatient care absent   
( ) * Discharge plans and education understood Activity ( ) * Ambulatory[G] Routes ( ) * Oral hydration, medications, and diet Interventions  
(X) Hgb/Hct   
4/8/2019 13:16:34 EDT by Mera Porter HGB 5.2, HCT 15.8 * Milestone Additional Notes  
4.6.19 VS:  98.2    89    17    98%    83/55   MAP 61 RESULTS:  WBC 11.3, RBC 1.50, HGB 5.2, HCT 15.8, .3, MCH 34.7, , NEUTS 86, LYMPHS 10, MONO 4, PT 11.4, , CR 0.53, CA 7.9, PROTEIN 5.3, ALB 2.9 CHEST PORT:  
Minimal left basilar atelectasis. 3 UNITS PRBC TRANSFUSED MEDS:  NS 75 ML HR CONT IV, PROTONIX 40 MG IV EVERY 12 HOURS PROGRESS NOTES:  
IM:  
Acute blood loss anemia (3/4/2017): Severe and symptomatic. Likely from the prior duodenal ulcers. Hgb 5.2 POA.  Transfuse PRBCs as needed  
   
Acute GI bleeding (5/8/2018) / HxDuodenal ulcer (4/5/2019): likely triggered by ongoing alcohol use. No NSAIDs use.  IV PPi. Consult GI. EGD with ulcer - ? Source of bleed  
   
SOB (shortness of breath) (6/3/2015) / Sinus tachycardia POA: due to severe anemia. Should improve with transfusion and cessation of ongoing bleeding  
   
Alcohol abuse (6/3/2015): still active. Closely watch for withdrawal symptoms. Resume Librium. Counseled on cesssation  
   
Hypothyroidism (6/3/2015): resume Levothyrxine  
   
Depression (6/3/2015): resume Cymbalta  
   
Code Status:  Full GI   
EGD:  
Findings:   
Esophagus:normal  
Stomach: normal   
Duodenum/jejunum: A clean based ulcer noted at the 1st portion of the duodenum. No active bleeding was noted. This is the likely source of blood loss. since no active bleeding was noted, no intervention was performed  
  Therapies:  none  
   
Specimens: none  
         
Complications:   None; patient tolerated the procedure well.  
   
EBL:  None.  
         
Impression:    A clean based and cratered ulcer was noted at the 1st portion of the duodenum. No active bleeding was noted at the time of the endoscopy that required any intervention. This is the likely cause of blood loss Interestingly oozing was noted from her molar tooth on exam. This might need to be addressed   
   
Recommendations:  
   
-Acid suppression with a proton pump inhibitor. -Monitor H&H closely  
-Okay to advance diet as tolerated GI:  
Assessment and Plan:  
\" Likely this is due to PUD vs gastritis from ongoing alcohol use. \" Agree with PPI IV at this time \" Continue to keep her NPO \" Will plan to proceed with an urgent EGD today for evaluation and treatment for any UGI source of blood loss. \" Will have further recommendations to follow the endoscopy PULMONARY:  
GI to see this AM  
\" Serial hemoglobins q8h  
\" Lactic acid \" PPI \" Synthroid \" Gentle fluids \" CXR for cough \" SCDs, no anticoagulation

## 2019-04-09 NOTE — PROGRESS NOTES
Daily Progress Note and Discharge Note: 4/9/2019 Anthony Beavers MD 
 
Assessment/Plan:  
Acute blood loss anemia (3/4/2017): Severe and symptomatic. Likely from the prior duodenal ulcers. Hgb 5.2 POA. Transfused PRBCs.  
  
Acute GI bleeding (5/8/2018) / HxDuodenal ulcer (4/5/2019): likely triggered by ongoing alcohol use. No NSAIDs use. IV PPi converted to po. Consult GI. EGD with ulcer - likely source of bleed 
  
SOB (shortness of breath) (6/3/2015) / Sinus tachycardia POA: due to severe anemia. Resolved.  
  
Alcohol abuse (6/3/2015): still active. Closely watch for withdrawal symptoms. Resume Librium. Counseled on cessation. Campral to be started outpt and cont Cymbalta.    
  
Hypothyroidism (6/3/2015): resumed Levothyrxine 
  
Depression (6/3/2015): resumed Cymbalta Hypokalemia - Replace. Monitor outpt. 
  
Code Status:  Full Problem List: 
Problem List as of 4/9/2019 Date Reviewed: 3/5/2017 Codes Class Noted - Resolved Melena ICD-10-CM: K92.1 ICD-9-CM: 578.1  4/6/2019 - Present Duodenal ulcer ICD-10-CM: K26.9 ICD-9-CM: 532.90  4/5/2019 - Present Acute GI bleeding ICD-10-CM: K92.2 ICD-9-CM: 578.9  5/8/2018 - Present Gastric ulcer ICD-10-CM: K25.9 ICD-9-CM: 531.90  5/8/2018 - Present SIRS (systemic inflammatory response syndrome) (HCC) ICD-10-CM: R65.10 ICD-9-CM: 995.90  5/8/2018 - Present GI bleed ICD-10-CM: K92.2 ICD-9-CM: 578.9  3/4/2017 - Present * (Principal) Acute blood loss anemia ICD-10-CM: D62 
ICD-9-CM: 285.1  3/4/2017 - Present Hypothyroidism (Chronic) ICD-10-CM: E03.9 ICD-9-CM: 244.9  6/3/2015 - Present Depression (Chronic) ICD-10-CM: F32.9 ICD-9-CM: 331  6/3/2015 - Present Microcytic anemia (Chronic) ICD-10-CM: D50.9 ICD-9-CM: 280.9  6/3/2015 - Present SOB (shortness of breath) ICD-10-CM: R06.02 
ICD-9-CM: 786.05  6/3/2015 - Present Alcohol abuse (Chronic) ICD-10-CM: F10.10 ICD-9-CM: 305.00  6/3/2015 - Present Subjective:  
 52 y.o. female who is being admitted for acute blood loss anemia due to a GI bleed. Ms. Jf Jonas presented to our Emergency Department today complaining of a progressively worsening fatigue, exertional SOB associated with palpitations and melena stools. She says she has had melanotic stools for the past several days but denies any hematemesis. She has a hx of a bleeding duodenal ulcer for which she was admitted in May 2018 and an EGD done then showed 1 ulcer(s) 8 mm in size located in [place] sweep for which she had an injection of 9 cc of epinephrine and heater probe therapy. She says she still drinks alcohol and has not been consistent in taking PPIs. Her Hgb today was found to be 5.2 g/dL and she remains tachycardic. She will be admitted for further management. 4/6:  No complaint at this time but sleepy from EGD just done. EGD showed ulcers but not currently bleeding. Continuing to monitor for ETOH withdrawal.  Cont to monitor Hb.  
 
4/7:  C/O a little indigestion this AM.  No other complaint and otherwise feeling better. Tolerating reg diet. Hb 9.2 this AM.  Cont to monitor. K+ lower - replacing. Can go out of unit to floor today. 4/8:  No ab discomfort this AM.  Feeling better. Hb 9.0 - watch another day. K+ 3.1 - replacing. No withdrawal sx. Long discussion of ETOH abuse and she wants to DC completely. She wants to try Campral.  Long hx of classic OCD (hand washing, listing, counting) but no recent tx - wants to try Lexapro again - was on it in the past - already on Cymbalta but she thinks it is not helping much. 4/9:  Feeling better. Hb stable. Discussed ETOH again - will rx Campral 333 TID initially and have her follow up with Dr James Knight later this wk to discuss continuing it/titrating it. Cont PPI but daily instead of PRN. Resume other home meds. K+ back to normal.  Follow up later this wk to check labs also. Follow up with GI as they direct. She also c/o \"my usual spring sinus infection\" and want antibiotic for it. C/o  Minor sinus congestion and left max area pressure today. Review of Systems: A comprehensive review of systems was negative except for that written in the HPI. Objective:  
Physical Exam:  
 
Visit Vitals /83 (BP 1 Location: Left arm, BP Patient Position: Sitting) Pulse 81 Temp 98.3 °F (36.8 °C) Resp 18 Ht 4' 10\" (1.473 m) Wt 53.8 kg (118 lb 9.7 oz) SpO2 97% BMI 24.79 kg/m² O2 Flow Rate (L/min): 4 l/min O2 Device: Room air Temp (24hrs), Av.9 °F (36.6 °C), Min:97.6 °F (36.4 °C), Max:98.3 °F (36.8 °C) No intake/output data recorded.  1901 -  0700 In: 2831.3 [P.O.:800; I.V.:2031.3] Out: - General:  Sleepy but awakens easily, cooperative, no distress, appears stated age. Head:  Normocephalic, without obvious abnormality, atraumatic. Eyes:  Conjunctivae/corneas clear. PERRL, EOMs intact. Nose: Nares normal. Septum midline. Mucosa normal. Clear drainage and mild left max sinus tenderness. Throat: Lips, mucosa, and tongue moist.. Neck: Supple, symmetrical, trachea midline, no adenopathy, thyroid: no enlargement/tenderness/nodules, no carotid bruit and no JVD. Lungs:   Clear to auscultation bilaterally. Chest wall:  No tenderness or deformity. Heart:  Regular rate and rhythm, S1, S2 normal, no murmur, click, rub or gallop. Abdomen:   Soft, non-tender. Bowel sounds normal. No masses,  No organomegaly. Extremities: no cyanosis or edema. No calf tenderness or cords. Pulses: 2+ and symmetric all extremities. Skin:  turgor normal. No rashes or lesions Neurologic: CNII-XII intact. Alert and oriented X 3. Fine motor of hands and fingers normal.   equal.  No cogwheeling or rigidity.   Gait not tested at this time. Sensation grossly normal to touch. Gross motor of extremities normal.    
 
Data Review:  
   
Recent Days: 
Recent Labs 04/09/19 
4268 04/08/19 
1317 04/08/19 
4584 WBC 4.5 5.9 5.5 HGB 9.0* 9.1* 9.0*  
HCT 27.8* 26.8* 27.6*  
 156 130* Recent Labs 04/09/19 
3708 04/08/19 
2421 04/07/19 
1736  138 134* K 3.6 3.1* 3.2*  
 109* 104 CO2 27 26 23 GLU 88 85 80 BUN 5* 2* 5*  
CREA 0.58 0.48* 0.50* CA 8.8 8.3* 7.4* MG 2.3 2.2  --   
ALB 3.0* 3.0*  --   
TBILI 0.3 0.4  --   
SGOT 28 35  --   
ALT 32 29  -- No results for input(s): PH, PCO2, PO2, HCO3, FIO2 in the last 72 hours. 24 Hour Results: 
Recent Results (from the past 24 hour(s)) CBC W/O DIFF Collection Time: 04/08/19  1:17 PM  
Result Value Ref Range WBC 5.9 3.6 - 11.0 K/uL  
 RBC 2.78 (L) 3.80 - 5.20 M/uL HGB 9.1 (L) 11.5 - 16.0 g/dL HCT 26.8 (L) 35.0 - 47.0 % MCV 96.4 80.0 - 99.0 FL  
 MCH 32.7 26.0 - 34.0 PG  
 MCHC 34.0 30.0 - 36.5 g/dL  
 RDW 15.7 (H) 11.5 - 14.5 % PLATELET 919 893 - 400 K/uL MPV 10.4 8.9 - 12.9 FL  
 NRBC 0.0 0  WBC ABSOLUTE NRBC 0.00 0.00 - 0.01 K/uL METABOLIC PANEL, COMPREHENSIVE Collection Time: 04/09/19  7:05 AM  
Result Value Ref Range Sodium 139 136 - 145 mmol/L Potassium 3.6 3.5 - 5.1 mmol/L Chloride 107 97 - 108 mmol/L  
 CO2 27 21 - 32 mmol/L Anion gap 5 5 - 15 mmol/L Glucose 88 65 - 100 mg/dL BUN 5 (L) 6 - 20 MG/DL Creatinine 0.58 0.55 - 1.02 MG/DL  
 BUN/Creatinine ratio 9 (L) 12 - 20 GFR est AA >60 >60 ml/min/1.73m2 GFR est non-AA >60 >60 ml/min/1.73m2 Calcium 8.8 8.5 - 10.1 MG/DL Bilirubin, total 0.3 0.2 - 1.0 MG/DL  
 ALT (SGPT) 32 12 - 78 U/L  
 AST (SGOT) 28 15 - 37 U/L Alk. phosphatase 92 45 - 117 U/L Protein, total 6.1 (L) 6.4 - 8.2 g/dL Albumin 3.0 (L) 3.5 - 5.0 g/dL Globulin 3.1 2.0 - 4.0 g/dL A-G Ratio 1.0 (L) 1.1 - 2.2 MAGNESIUM  
 Collection Time: 04/09/19  7:05 AM  
Result Value Ref Range Magnesium 2.3 1.6 - 2.4 mg/dL CBC WITH AUTOMATED DIFF Collection Time: 04/09/19  7:05 AM  
Result Value Ref Range WBC 4.5 3.6 - 11.0 K/uL  
 RBC 2.84 (L) 3.80 - 5.20 M/uL HGB 9.0 (L) 11.5 - 16.0 g/dL HCT 27.8 (L) 35.0 - 47.0 % MCV 97.9 80.0 - 99.0 FL  
 MCH 31.7 26.0 - 34.0 PG  
 MCHC 32.4 30.0 - 36.5 g/dL  
 RDW 15.8 (H) 11.5 - 14.5 % PLATELET 277 009 - 522 K/uL MPV 10.1 8.9 - 12.9 FL  
 NRBC 0.0 0  WBC ABSOLUTE NRBC 0.00 0.00 - 0.01 K/uL NEUTROPHILS 43 32 - 75 % LYMPHOCYTES 42 12 - 49 % MONOCYTES 10 5 - 13 % EOSINOPHILS 5 0 - 7 % BASOPHILS 0 0 - 1 % IMMATURE GRANULOCYTES 0 0.0 - 0.5 % ABS. NEUTROPHILS 1.9 1.8 - 8.0 K/UL  
 ABS. LYMPHOCYTES 1.9 0.8 - 3.5 K/UL  
 ABS. MONOCYTES 0.5 0.0 - 1.0 K/UL  
 ABS. EOSINOPHILS 0.2 0.0 - 0.4 K/UL  
 ABS. BASOPHILS 0.0 0.0 - 0.1 K/UL  
 ABS. IMM. GRANS. 0.0 0.00 - 0.04 K/UL  
 DF AUTOMATED Medications reviewed Current Facility-Administered Medications Medication Dose Route Frequency  potassium chloride SR (KLOR-CON 10) tablet 20 mEq  20 mEq Oral BID  sucralfate (CARAFATE) tablet 1 g  1 g Oral AC&HS  fluticasone propionate (FLONASE) 50 mcg/actuation nasal spray 2 Spray  2 Spray Both Nostrils DAILY  acetaminophen (TYLENOL) tablet 500 mg  500 mg Oral QHS And  
 diphenhydrAMINE (BENADRYL) capsule 25 mg  25 mg Oral QHS  
 0.45% sodium chloride infusion  75 mL/hr IntraVENous CONTINUOUS  
 midazolam (VERSED) injection 0.25-5 mg  0.25-5 mg IntraVENous Multiple  simethicone (MYLICON) 72SX/4.6CX oral drops 80 mg  1.2 mL Oral Multiple  sodium chloride (NS) flush 5-40 mL  5-40 mL IntraVENous Q8H  
 sodium chloride (NS) flush 5-40 mL  5-40 mL IntraVENous PRN  
 acetaminophen (TYLENOL) tablet 650 mg  650 mg Oral Q4H PRN  
 naloxone (NARCAN) injection 0.4 mg  0.4 mg IntraVENous PRN  
  ondansetron (ZOFRAN) injection 4 mg  4 mg IntraVENous Q4H PRN  pantoprazole (PROTONIX) 40 mg in sodium chloride 0.9% 10 mL injection  40 mg IntraVENous Q12H  chlordiazePOXIDE (LIBRIUM) capsule 10 mg  10 mg Oral TID PRN  
 DULoxetine (CYMBALTA) capsule 120 mg  120 mg Oral DAILY  levothyroxine (SYNTHROID) tablet 50 mcg  50 mcg Oral ACB Care Plan discussed with: Patient and Nurse Total time spent with patient: 30 minutes.  
Pattie Toure MD

## 2020-08-25 NOTE — PROGRESS NOTES
Daily Progress Note: 4/7/2019 Lydia Stiles MD 
 
Assessment/Plan:  
Acute blood loss anemia (3/4/2017): Severe and symptomatic. Likely from the prior duodenal ulcers. Hgb 5.2 POA. Transfuse PRBCs as needed 
  
Acute GI bleeding (5/8/2018) / HxDuodenal ulcer (4/5/2019): likely triggered by ongoing alcohol use. No NSAIDs use. IV PPi. Consult GI. EGD with ulcer - ? Source of bleed 
  
SOB (shortness of breath) (6/3/2015) / Sinus tachycardia POA: due to severe anemia. Should improve with transfusion and cessation of ongoing bleeding 
  
Alcohol abuse (6/3/2015): still active. Closely watch for withdrawal symptoms. Resume Librium. Counseled on cesssation 
  
Hypothyroidism (6/3/2015): resume Levothyrxine 
  
Depression (6/3/2015): resume Cymbalta 
  
Code Status:  Full Problem List: 
Problem List as of 4/7/2019 Date Reviewed: 3/5/2017 Codes Class Noted - Resolved Melena ICD-10-CM: K92.1 ICD-9-CM: 578.1  4/6/2019 - Present Duodenal ulcer ICD-10-CM: K26.9 ICD-9-CM: 532.90  4/5/2019 - Present Acute GI bleeding ICD-10-CM: K92.2 ICD-9-CM: 578.9  5/8/2018 - Present Gastric ulcer ICD-10-CM: K25.9 ICD-9-CM: 531.90  5/8/2018 - Present SIRS (systemic inflammatory response syndrome) (HCC) ICD-10-CM: R65.10 ICD-9-CM: 995.90  5/8/2018 - Present GI bleed ICD-10-CM: K92.2 ICD-9-CM: 578.9  3/4/2017 - Present * (Principal) Acute blood loss anemia ICD-10-CM: D62 
ICD-9-CM: 285.1  3/4/2017 - Present Hypothyroidism (Chronic) ICD-10-CM: E03.9 ICD-9-CM: 244.9  6/3/2015 - Present Depression (Chronic) ICD-10-CM: F32.9 ICD-9-CM: 691  6/3/2015 - Present Microcytic anemia (Chronic) ICD-10-CM: D50.9 ICD-9-CM: 280.9  6/3/2015 - Present SOB (shortness of breath) ICD-10-CM: R06.02 
ICD-9-CM: 786.05  6/3/2015 - Present Alcohol abuse (Chronic) ICD-10-CM: F10.10 ICD-9-CM: 305.00  6/3/2015 - Present Subjective:  
 52 y.o. female who is being admitted for acute blood loss anemia due to a GI bleed. Ms. Tamez Comes presented to our Emergency Department today complaining of a progressively worsening fatigue, exertional SOB associated with palpitations and melena stools. She says she has had melanotic stools for the past several days but denies any hematemesis. She has a hx of a bleeding duodenal ulcer for which she was admitted in May 2018 and an EGD done then showed 1 ulcer(s) 8 mm in size located in [place] sweep for which she had an injection of 9 cc of epinephrine and heater probe therapy. She says she still drinks alcohol and has not been consistent in taking PPIs. Her Hgb today was found to be 5.2 g/dL and she remains tachycardic. She will be admitted for further management. :  No complaint at this time but sleepy from EGD just done. EGD showed ulcers but not currently bleeding. Continuing to monitor for ETOH withdrawal.  Cont to monitor Hb.  
 
:  C/O a little indigestion this AM.  No other complaint and otherwise feeling better. Tolerating reg diet. Hb 9.2 this AM.  Cont to monitor. K+ lower - replacing. Can go out of unit to floor today. Review of Systems: A comprehensive review of systems was negative except for that written in the HPI. Objective:  
Physical Exam:  
 
Visit Vitals BP 99/63 Pulse 78 Temp 98.6 °F (37 °C) Resp 18 Ht 4' 10\" (1.473 m) Wt 53.8 kg (118 lb 9.7 oz) SpO2 92% BMI 24.79 kg/m² O2 Flow Rate (L/min): 4 l/min O2 Device: Room air Temp (24hrs), Av.2 °F (36.8 °C), Min:97.6 °F (36.4 °C), Max:98.6 °F (37 °C) No intake/output data recorded.  1901 -  0700 In: 3459.8 [P.O.:510; I.V.:1907.5] Out: - General:  Sleepy but awakens easily, cooperative, no distress, appears stated age. Head:  Normocephalic, without obvious abnormality, atraumatic. Eyes:  Conjunctivae/corneas clear. PERRL, EOMs intact. Nose: Nares normal. Septum midline. Mucosa normal. No drainage or sinus tenderness. Throat: Lips, mucosa, and tongue moist.. Neck: Supple, symmetrical, trachea midline, no adenopathy, thyroid: no enlargement/tenderness/nodules, no carotid bruit and no JVD. Lungs:   Clear to auscultation bilaterally. Chest wall:  No tenderness or deformity. Heart:  Regular rate and rhythm, S1, S2 normal, no murmur, click, rub or gallop. Abdomen:   Soft, non-tender. Bowel sounds normal. No masses,  No organomegaly. Extremities: no cyanosis or edema. No calf tenderness or cords. Pulses: 2+ and symmetric all extremities. Skin:  turgor normal. No rashes or lesions Neurologic: CNII-XII intact. Alert and oriented X 3. Fine motor of hands and fingers normal.   equal.  No cogwheeling or rigidity. Gait not tested at this time. Sensation grossly normal to touch. Gross motor of extremities normal.    
 
Data Review:  
   
Recent Days: 
Recent Labs 04/07/19 
1258 04/07/19 
0055 04/06/19 
1604  04/05/19 1913 WBC  --   --   --   --  11.3* HGB 9.2* 6.2* 7.2*   < > 5.2* HCT 27.2* 18.5* 21.3*   < > 15.8* PLT  --   --   --   --  142*  
 < > = values in this interval not displayed. Recent Labs 04/07/19 
0055 04/06/19 
0146 04/05/19 1913 04/05/19 
1830 * 139  --  133* K 3.2* 3.5  --  4.5  
 110*  --  104 CO2 23 23  --  18* GLU 80 94  --  141* BUN 5* 12  --  19  
CREA 0.50* 0.53*  --  0.74 CA 7.4* 7.9*  --  8.7 ALB  --  2.9*  --  3.0* TBILI  --  0.9  --  0.2 SGOT  --  19  --  34 ALT  --  23  --  27 INR  --   --  1.1  -- No results for input(s): PH, PCO2, PO2, HCO3, FIO2 in the last 72 hours. 24 Hour Results: 
Recent Results (from the past 24 hour(s)) HGB & HCT Collection Time: 04/06/19  4:04 PM  
Result Value Ref Range HGB 7.2 (L) 11.5 - 16.0 g/dL HCT 21.3 (L) 35.0 - 47.0 % HGB & HCT Collection Time: 04/07/19 12:55 AM  
Result Value Ref Range HGB 6.2 (L) 11.5 - 16.0 g/dL HCT 18.5 (L) 35.0 - 47.0 % SAMPLES BEING HELD Collection Time: 04/07/19 12:55 AM  
Result Value Ref Range SAMPLES BEING HELD 1PST,1SST   
 COMMENT Add-on orders for these samples will be processed based on acceptable specimen integrity and analyte stability, which may vary by analyte. METABOLIC PANEL, BASIC Collection Time: 04/07/19 12:55 AM  
Result Value Ref Range Sodium 134 (L) 136 - 145 mmol/L Potassium 3.2 (L) 3.5 - 5.1 mmol/L Chloride 104 97 - 108 mmol/L  
 CO2 23 21 - 32 mmol/L Anion gap 7 5 - 15 mmol/L Glucose 80 65 - 100 mg/dL BUN 5 (L) 6 - 20 MG/DL Creatinine 0.50 (L) 0.55 - 1.02 MG/DL  
 BUN/Creatinine ratio 10 (L) 12 - 20 GFR est AA >60 >60 ml/min/1.73m2 GFR est non-AA >60 >60 ml/min/1.73m2 Calcium 7.4 (L) 8.5 - 10.1 MG/DL  
HGB & HCT Collection Time: 04/07/19  6:13 AM  
Result Value Ref Range HGB 9.2 (L) 11.5 - 16.0 g/dL HCT 27.2 (L) 35.0 - 47.0 % Medications reviewed Current Facility-Administered Medications Medication Dose Route Frequency  0.45% sodium chloride infusion  75 mL/hr IntraVENous CONTINUOUS  
 midazolam (VERSED) injection 0.25-5 mg  0.25-5 mg IntraVENous Multiple  simethicone (MYLICON) 78UY/1.7EP oral drops 80 mg  1.2 mL Oral Multiple  sodium chloride (NS) flush 5-40 mL  5-40 mL IntraVENous Q8H  
 sodium chloride (NS) flush 5-40 mL  5-40 mL IntraVENous PRN  
 acetaminophen (TYLENOL) tablet 650 mg  650 mg Oral Q4H PRN  
 naloxone (NARCAN) injection 0.4 mg  0.4 mg IntraVENous PRN  
 ondansetron (ZOFRAN) injection 4 mg  4 mg IntraVENous Q4H PRN  pantoprazole (PROTONIX) 40 mg in sodium chloride 0.9% 10 mL injection  40 mg IntraVENous Q12H  chlordiazePOXIDE (LIBRIUM) capsule 10 mg  10 mg Oral TID PRN  
 DULoxetine (CYMBALTA) capsule 120 mg  120 mg Oral DAILY  levothyroxine (SYNTHROID) tablet 50 mcg  50 mcg Oral ACB Care Plan discussed with: Patient and Nurse Total time spent with patient: 30 minutes.  
Talisha Garcia MD 
 Estimated Blood Loss (Cc): less than 10 cc

## 2021-08-03 PROBLEM — K92.2 GI BLEED: Status: RESOLVED | Noted: 2017-03-04 | Resolved: 2021-08-03

## 2022-03-18 PROBLEM — K92.1 MELENA: Status: ACTIVE | Noted: 2019-04-06

## 2022-03-18 PROBLEM — R65.10 SIRS (SYSTEMIC INFLAMMATORY RESPONSE SYNDROME) (HCC): Status: ACTIVE | Noted: 2018-05-08

## 2022-03-18 PROBLEM — D62 ACUTE BLOOD LOSS ANEMIA: Status: ACTIVE | Noted: 2017-03-04

## 2022-03-19 PROBLEM — K25.9 GASTRIC ULCER: Status: ACTIVE | Noted: 2018-05-08

## 2022-03-19 PROBLEM — K92.2 ACUTE GI BLEEDING: Status: ACTIVE | Noted: 2018-05-08

## 2022-03-19 PROBLEM — K26.9 DUODENAL ULCER: Status: ACTIVE | Noted: 2019-04-05

## 2023-04-03 NOTE — PROGRESS NOTES
Daily Progress Note and Discharge Note: 5/11/2018  Germaine Stark MD    Assessment/Plan:   Acute GI bleeding (5/8/2018)/ Hx Gastric ulcer (5/8/2018):  upper GI bleed due to Duodenal Ulcer. Trigger seems to be BC powder and ethanol use.    ---PPI daily  ---No ETOH, No NSAIDS - educated pt  ---EGD 5/9- Duodenal ulcer  ---repeat CBC early next wk at office     Acute blood loss anemia (3/4/2017): due to GI bleed. ---Transfused 2 units PRBCs. ---Monitor Hgb outpt.      Alcohol abuse (6/3/2015): counseled on cessation. Reports last drink 2 wks ago.  ---Risk of restarting discussed and discussed tx options - discuss with her PCP     SIRS (systemic inflammatory response syndrome) ([facility] (5/8/2018): likely from the GI bleed.  ---Monitor      Depression (6/3/2015): resume Cymbalta     Hypothyroidism (6/3/2015): not taking any medications and prior TSH was normal.  ---Recheck TSH outpt  ---Started Synthroid 50 mcg daily    Hypokalemia - resolved  ---Recheck K+ and Mag outpt early next wk    Psoriasis POA: resume humira per PCP       Problem List:  Problem List as of 5/11/2018  Date Reviewed: 3/5/2017          Codes Class Noted - Resolved    * (Principal)Acute GI bleeding ICD-10-CM: K92.2  ICD-9-CM: 578.9  5/8/2018 - Present        Gastric ulcer ICD-10-CM: K25.9  ICD-9-CM: 531.90  5/8/2018 - Present        SIRS (systemic inflammatory response syndrome) ([facility] ICD-10-CM: R65.10  ICD-9-CM: 995.90  5/8/2018 - Present        GI bleed ICD-10-CM: K92.2  ICD-9-CM: 578.9  3/4/2017 - Present        Acute blood loss anemia ICD-10-CM: D62  ICD-9-CM: 285.1  3/4/2017 - Present        Hypothyroidism (Chronic) ICD-10-CM: E03.9  ICD-9-CM: 244.9  6/3/2015 - Present        Depression (Chronic) ICD-10-CM: F32.9  ICD-9-CM: 023  6/3/2015 - Present        Microcytic anemia (Chronic) ICD-10-CM: D50.9  ICD-9-CM: 280.9  6/3/2015 - Present        Alcohol abuse (Chronic) ICD-10-CM: F10.10  ICD-9-CM: 305.00  6/3/2015 - Present RESOLVED: SOB (shortness of breath) ICD-10-CM: R06.02  ICD-9-CM: 786.05  6/3/2015 - 3/4/2017              HPI:   Ms. Hang Santiago is a 45 y.o. female who is being admitted for acute GI bleeding. Ms. Hang Santiago presented to our Emergency Department today complaining of melena stools for the past 2 days. Associated with a moderate aching upper abdominal discomfort, exertional SOB and lightheadedness. She denies any nausea, vomiting or hematemesis. She has been taking BC powder for sinus disease for the past 1-2 weeks as well as drinking alcohol which she says is intermittent. She was found to be anemic with a Hgb of 6.7. Her prior EGD had shown gastric ulcers. She will be admitted for further management. (Dr Diane Osorio)    5/9: Is due for EGD today. No further melena. Hb up to 8.2. She has been fatigued and would like to have her thyroid checked. 5/10: EGD showed duodenal ulcer- cauterized. She is tolerating liquids well so will advance to GI soft. Hb stable at 8. K+ low so will replete IV as po may be GI irritating. Check Mag. Her TSH is 5 so will restart Synthroid. Hopefully discharge tomorrow. 5/11:  No complaints and wants to go home. Hb 7.9 today. Discussed ETOH DC and tx options such as meds and AA. She is to follow up with PCP, or other in our office, for this and recheck of labs early next wk. Also educated no ASA or NSAIDS and she requested rx for Fioricet - will give her only 10 until seen by PCP. She also requests Librium and Valium and advised her she needs to see PCP for those rxs. Reviewed her meds with her. Report any problems to on call MD.  Follow up with GI as they direct. Review of Systems:   A comprehensive review of systems was negative except for that written in the HPI.     Objective:   Physical Exam:     Visit Vitals    /60 (BP 1 Location: Right leg, BP Patient Position: At rest)    Pulse 84    Temp 97.9 °F (36.6 °C)    Resp 18    Ht 4' 10\" (1.473 m)    Wt 49.5 kg (109 lb 2 oz)  SpO2 100%    BMI 22.81 kg/m2      O2 Device: Room air    Temp (24hrs), Av °F (36.7 °C), Min:97.8 °F (36.6 °C), Max:98.3 °F (36.8 °C)        1901 -  0700  In:  [P.O.:1540; I.V.:500]  Out: 3300 [Urine:3300]    General:  Alert, cooperative, no distress, appears stated age. Head:  Normocephalic, without obvious abnormality, atraumatic. Eyes:  Conjunctivae/corneas clear. PERRL, EOMs intact. Nose: Nares normal. Septum midline. Mucosa normal. No drainage or sinus tenderness. Throat: Lips, mucosa, and tongue normal. Teeth and gums normal.   Neck: Supple, symmetrical, trachea midline, no adenopathy, thyroid: no enlargement/tenderness/nodules, no carotid bruit and no JVD. Back:   Symmetric, no curvature. ROM normal. No CVA tenderness. Lungs:   Clear to auscultation bilaterally. Chest wall:  No tenderness or deformity. Heart:  Regular rate and rhythm, S1, S2 normal, no murmur, click, rub or gallop. Abdomen:   Soft, non-tender. Bowel sounds normal. No masses,  No organomegaly. Extremities: Extremities normal, atraumatic, no cyanosis or edema. No calf tenderness or cords. Pulses: 2+ and symmetric all extremities. Skin: Skin color, texture, turgor normal. No rashes or lesions   Neurologic: CNII-XII intact. Alert and oriented X 3. Fine motor of hands and fingers normal.   equal.  No cogwheeling or rigidity. Gait not tested at this time. Sensation grossly normal to touch.   Gross motor of extremities normal.       Data Review:       Recent Days:  Recent Labs      18   0107  05/10/18   0055  18   0051   WBC  6.9  6.4  7.6   HGB  7.9*  8.0*  8.2*   HCT  24.4*  24.5*  24.7*   PLT  161  160  137*     Recent Labs      18   0107  05/10/18   0055  18   0051  18   1300   NA  140  140  137  137   K  3.6  2.8*  3.8  3.8   CL  107  107  107  102   CO2  25  24  21  21   GLU  104*  97  87  131*   BUN  9  7  17  23*   CREA  0.67  0.61  0.62  0.77   CA  8.2* 8. 1*  7.9*  8.6   MG   --   1.9  1.9   --    PHOS   --    --   3.0   --    ALB   --   3.1*  2.9*  3.4*   TBILI   --   0.4  1.4*  0.3   SGOT   --   33  34  31   ALT   --   29  20  31     No results for input(s): PH, PCO2, PO2, HCO3, FIO2 in the last 72 hours. 24 Hour Results:  Recent Results (from the past 24 hour(s))   CBC WITH AUTOMATED DIFF    Collection Time: 05/11/18  1:07 AM   Result Value Ref Range    WBC 6.9 3.6 - 11.0 K/uL    RBC 2.56 (L) 3.80 - 5.20 M/uL    HGB 7.9 (L) 11.5 - 16.0 g/dL    HCT 24.4 (L) 35.0 - 47.0 %    MCV 95.3 80.0 - 99.0 FL    MCH 30.9 26.0 - 34.0 PG    MCHC 32.4 30.0 - 36.5 g/dL    RDW 17.3 (H) 11.5 - 14.5 %    PLATELET 850 692 - 176 K/uL    MPV 10.0 8.9 - 12.9 FL    NRBC 0.0 0  WBC    ABSOLUTE NRBC 0.00 0.00 - 0.01 K/uL    NEUTROPHILS 57 32 - 75 %    LYMPHOCYTES 30 12 - 49 %    MONOCYTES 9 5 - 13 %    EOSINOPHILS 4 0 - 7 %    BASOPHILS 0 0 - 1 %    IMMATURE GRANULOCYTES 0 0.0 - 0.5 %    ABS. NEUTROPHILS 3.9 1.8 - 8.0 K/UL    ABS. LYMPHOCYTES 2.1 0.8 - 3.5 K/UL    ABS. MONOCYTES 0.6 0.0 - 1.0 K/UL    ABS. EOSINOPHILS 0.3 0.0 - 0.4 K/UL    ABS. BASOPHILS 0.0 0.0 - 0.1 K/UL    ABS. IMM.  GRANS. 0.0 0.00 - 0.04 K/UL    DF AUTOMATED     METABOLIC PANEL, BASIC    Collection Time: 05/11/18  1:07 AM   Result Value Ref Range    Sodium 140 136 - 145 mmol/L    Potassium 3.6 3.5 - 5.1 mmol/L    Chloride 107 97 - 108 mmol/L    CO2 25 21 - 32 mmol/L    Anion gap 8 5 - 15 mmol/L    Glucose 104 (H) 65 - 100 mg/dL    BUN 9 6 - 20 MG/DL    Creatinine 0.67 0.55 - 1.02 MG/DL    BUN/Creatinine ratio 13 12 - 20      GFR est AA >60 >60 ml/min/1.73m2    GFR est non-AA >60 >60 ml/min/1.73m2    Calcium 8.2 (L) 8.5 - 10.1 MG/DL       Medications reviewed  Current Facility-Administered Medications   Medication Dose Route Frequency    levothyroxine (SYNTHROID) tablet 50 mcg  50 mcg Oral ACB    DULoxetine (CYMBALTA) capsule 120 mg  120 mg Oral DAILY    LORazepam (ATIVAN) injection 1 mg  1 mg IntraVENous QHS PRN    0.9% sodium chloride infusion 250 mL  250 mL IntraVENous PRN    sodium chloride (NS) flush 5-10 mL  5-10 mL IntraVENous Q8H    sodium chloride (NS) flush 5-10 mL  5-10 mL IntraVENous PRN    acetaminophen (TYLENOL) tablet 650 mg  650 mg Oral Q4H PRN    morphine injection 2 mg  2 mg IntraVENous Q4H PRN    naloxone (NARCAN) injection 0.4 mg  0.4 mg IntraVENous PRN    diphenhydrAMINE (BENADRYL) injection 12.5 mg  12.5 mg IntraVENous Q4H PRN    prochlorperazine (COMPAZINE) injection 5 mg  5 mg IntraVENous Q8H PRN    pantoprazole (PROTONIX) 40 mg in sodium chloride 0.9% 10 mL injection  40 mg IntraVENous Q12H    LORazepam (ATIVAN) injection 2 mg  2 mg IntraVENous Q1H PRN    LORazepam (ATIVAN) injection 4 mg  4 mg IntraVENous Q3L PRN    folic acid (FOLVITE) tablet 1 mg  1 mg Oral DAILY    Thiamine Mononitrate (B-1) tablet 100 mg  100 mg Oral DAILY    multivitamin with iron tablet 1 Tab  1 Tab Oral DAILY       Care Plan discussed with: Patient/Family and Nurse    Total time spent with patient and review of records: 30 minutes.     Gurmeet Rossi MD Island Pedicle Flap-Requiring Vessel Identification Text: The defect edges were debeveled with a #15 scalpel blade.  Given the location of the defect, shape of the defect and the proximity to free margins an island pedicle advancement flap was deemed most appropriate.  Using a sterile surgical marker, an appropriate advancement flap was drawn, based on the axial vessel mentioned above, incorporating the defect, outlining the appropriate donor tissue and placing the expected incisions within the relaxed skin tension lines where possible.    The area thus outlined was incised deep to adipose tissue with a #15 scalpel blade.  The skin margins were undermined to an appropriate distance in all directions around the primary defect and laterally outward around the island pedicle utilizing iris scissors.  There was minimal undermining beneath the pedicle flap.

## 2025-03-31 ENCOUNTER — TRANSCRIBE ORDERS (OUTPATIENT)
Facility: HOSPITAL | Age: 60
End: 2025-03-31

## 2025-03-31 DIAGNOSIS — H50.00 ESOTROPIA: Primary | ICD-10-CM

## 2025-04-17 ENCOUNTER — HOSPITAL ENCOUNTER (OUTPATIENT)
Facility: HOSPITAL | Age: 60
Discharge: HOME | End: 2025-04-20
Attending: STUDENT IN AN ORGANIZED HEALTH CARE EDUCATION/TRAINING PROGRAM
Payer: MEDICARE

## 2025-04-17 VITALS — WEIGHT: 100 LBS

## 2025-04-17 DIAGNOSIS — H50.00 ESOTROPIA: ICD-10-CM

## 2025-04-17 PROCEDURE — 6360000004 HC RX CONTRAST MEDICATION: Performed by: RADIOLOGY

## 2025-04-17 PROCEDURE — 70553 MRI BRAIN STEM W/O & W/DYE: CPT

## 2025-04-17 PROCEDURE — A9579 GAD-BASE MR CONTRAST NOS,1ML: HCPCS | Performed by: RADIOLOGY

## 2025-04-17 RX ADMIN — GADOTERIDOL 9 ML: 279.3 INJECTION, SOLUTION INTRAVENOUS at 17:59

## (undated) DEVICE — KENDALL RADIOLUCENT FOAM MONITORING ELECTRODE -RECTANGULAR SHAPE: Brand: KENDALL

## (undated) DEVICE — SOLIDIFIER MEDC 1200ML -- CONVERT TO 356117

## (undated) DEVICE — SYR 3ML LL TIP 1/10ML GRAD --

## (undated) DEVICE — BIPOLAR ELECTROHEMOSTASIS CATHETER: Brand: INJECTION GOLD PROBE

## (undated) DEVICE — BITEBLOCK ENDOSCP 60FR MAXI WHT POLYETH STURDY W/ VELC WVN

## (undated) DEVICE — FORCEPS BX L240CM JAW DIA2.8MM L CAP W/ NDL MIC MESH TOOTH

## (undated) DEVICE — 1200 GUARD II KIT W/5MM TUBE W/O VAC TUBE: Brand: GUARDIAN

## (undated) DEVICE — BAG BELONG PT PERS CLEAR HANDL

## (undated) DEVICE — KIT COLON W/ 1.1OZ LUB AND 2 END

## (undated) DEVICE — 3M™ CUROS™ DISINFECTING CAP FOR NEEDLELESS CONNECTORS 270/CARTON 20 CARTONS/CASE CFF1-270: Brand: CUROS™

## (undated) DEVICE — SYR 5ML 1/5 GRAD LL NSAF LF --

## (undated) DEVICE — BASIN EMSIS 16OZ GRAPHITE PLAS KID SHP MOLD GRAD FOR ORAL

## (undated) DEVICE — TUBING ADMIN SET INTRAV ARTERI -- CONVERT TO ITEM 340436

## (undated) DEVICE — NDL FLTR TIP 5 MIC 18GX1.5IN --

## (undated) DEVICE — CANN NASAL O2 CAPNOGRAPHY AD -- FILTERLINE

## (undated) DEVICE — BAG SPEC BIOHZRD 10 X 10 IN --

## (undated) DEVICE — CONTAINER SPEC 20 ML LID NEUT BUFF FORMALIN 10 % POLYPR STS

## (undated) DEVICE — NDL INJ SCLERO 25G 240CM -- INTERJECT M00518360 BX/5

## (undated) DEVICE — SET GRAV CK VLV NEEDLESS ST 3 GANGED 4WAY STPCOCK HI FLO 10

## (undated) DEVICE — SET ADMIN 16ML TBNG L100IN 2 Y INJ SITE IV PIGGY BK DISP

## (undated) DEVICE — NDL PRT INJ NSAF BLNT 18GX1.5 --